# Patient Record
Sex: MALE | Race: WHITE | NOT HISPANIC OR LATINO | URBAN - METROPOLITAN AREA
[De-identification: names, ages, dates, MRNs, and addresses within clinical notes are randomized per-mention and may not be internally consistent; named-entity substitution may affect disease eponyms.]

---

## 2021-11-13 ENCOUNTER — EMERGENCY (EMERGENCY)
Facility: HOSPITAL | Age: 71
LOS: 1 days | Discharge: HOME | End: 2021-11-13
Attending: EMERGENCY MEDICINE
Payer: MEDICARE

## 2021-11-13 VITALS
DIASTOLIC BLOOD PRESSURE: 64 MMHG | TEMPERATURE: 98 F | HEIGHT: 65 IN | SYSTOLIC BLOOD PRESSURE: 142 MMHG | WEIGHT: 167.99 LBS | OXYGEN SATURATION: 98 % | HEART RATE: 82 BPM | RESPIRATION RATE: 16 BRPM

## 2021-11-13 VITALS
RESPIRATION RATE: 16 BRPM | HEART RATE: 78 BPM | DIASTOLIC BLOOD PRESSURE: 75 MMHG | OXYGEN SATURATION: 94 % | SYSTOLIC BLOOD PRESSURE: 154 MMHG | TEMPERATURE: 98 F

## 2021-11-13 PROCEDURE — 99284 EMERGENCY DEPT VISIT MOD MDM: CPT

## 2021-11-13 PROCEDURE — 99283 EMERGENCY DEPT VISIT LOW MDM: CPT

## 2021-11-13 RX ORDER — DIAZEPAM 5 MG
1 TABLET ORAL
Qty: 8 | Refills: 0
Start: 2021-11-13

## 2021-11-13 RX ORDER — ACETAMINOPHEN 500 MG
975 TABLET ORAL ONCE
Refills: 0 | Status: COMPLETED | OUTPATIENT
Start: 2021-11-13 | End: 2021-11-13

## 2021-11-13 RX ORDER — LIDOCAINE 4 G/100G
1 CREAM TOPICAL ONCE
Refills: 0 | Status: COMPLETED | OUTPATIENT
Start: 2021-11-13 | End: 2021-11-13

## 2021-11-13 RX ORDER — DIAZEPAM 5 MG
5 TABLET ORAL ONCE
Refills: 0 | Status: DISCONTINUED | OUTPATIENT
Start: 2021-11-13 | End: 2021-11-13

## 2021-11-13 RX ORDER — IBUPROFEN 200 MG
400 TABLET ORAL ONCE
Refills: 0 | Status: COMPLETED | OUTPATIENT
Start: 2021-11-13 | End: 2021-11-13

## 2021-11-13 RX ADMIN — Medication 975 MILLIGRAM(S): at 15:59

## 2021-11-13 RX ADMIN — LIDOCAINE 1 PATCH: 4 CREAM TOPICAL at 16:00

## 2021-11-13 RX ADMIN — Medication 400 MILLIGRAM(S): at 16:00

## 2021-11-13 RX ADMIN — Medication 5 MILLIGRAM(S): at 16:00

## 2021-11-13 NOTE — ED PROVIDER NOTE - PHYSICAL EXAMINATION
NAD, VSS, Afebrile, Lungs clear. ABD soft, non tender. No CVA tender. No spina midline tender. + b/l para lumbar tender with left sided muscle spasm. No sciatica tender. No peripheral edema. Neuro- intact.

## 2021-11-13 NOTE — ED PROVIDER NOTE - CLINICAL SUMMARY MEDICAL DECISION MAKING FREE TEXT BOX
LYNNETTE Lorenz MD: 72 y/o male with PMH HLD presents with back pain. LYNNETTE Lorenz MD: 72 y/o male with PMH HLD presents with acute on chronic back pain. Pt has a h/o LBP x months, followed with his PCP Dr. Guido, getting PT. Has had gradually worsening LBP x 2 weeks, non-radiating. Denies focal numbness/weakness, bowel/bladder incontinence, saddle anesthesia, pain radiating down legs, IVDA, f/c. No red flag signs/sx concerning for cauda equina or cord compression. Pt is ambulatory with no focal neurologic deficits on exam. No previous epidural injections. Plan: pain control, outpt f/u with Spine center with return precautions

## 2021-11-13 NOTE — ED ADULT NURSE NOTE - OBJECTIVE STATEMENT
72 y/o male coming in from home complaining of left flank pain. AOx4, ambulatory, PMH spinal surgery, HLD. Pt. reports multiple weeks of left lower back pain. No bruising, signs of trauma noted. Denies falls. Denies abdominal pain. Denies urinary symptoms. 72 y/o male coming in from home complaining of left flank pain. AOx4, ambulatory, PMH spinal surgery, HLD. Pt. reports multiple weeks of left lower back pain. Pain has been worsening and feels like a spasm. Pain is worse with ambulation. No bruising, signs of trauma noted. Denies falls. Denies abdominal pain. Denies urinary symptoms. Denies chest pain, SOB, fevers, chills, N/V/D. Pt. is well appearing. VSS. WIll continue to reassess.

## 2021-11-13 NOTE — ED PROVIDER NOTE - PATIENT PORTAL LINK FT
You can access the FollowMyHealth Patient Portal offered by [facility] by registering at the following website: [email or web address]. By joining [employer] [facility]’s FollowMyHealth portal, you will also be able to view your health information using other applications (apps) compatible with our system.

## 2021-11-13 NOTE — ED PROVIDER NOTE - OBJECTIVE STATEMENT
72yo male pt with PMHx of HLD presents to ED with worsening back spasm pain for 2 weeks. Pt stated he's had lower back pain for months and evaluated by PMD Dr. Bauman. He's on PT now but noticed gradual worsening back spasm pain with movement since 2 weeks ago. Denies recent injury. Denies radiating pain to extremities. Denies fever, chills, cough or congestion. Denies CP/SOB/ABD pain or N/V/D. Denies urinary or bowel problems. Denies sensory changes or weakness to extremities.

## 2021-11-13 NOTE — ED PROVIDER NOTE - NSFOLLOWUPINSTRUCTIONS_ED_ALL_ED_FT
No heavy lifting or strain your back. No heavy lifting or strain your back.    Salonpas with Lidocaine patch to pain area as package directed.    Take Advil 400mg and/or Tylenol 975mg every 8hours for pain with food.     Valium as prescribed for back spasm pain with a caution of drowsiness; NO DRIVE or DRINK while taking Valium.    Follow up with your primary [facility] ([phone]) for reevaluation, call Monday for appointment.    Return for any concerns or worsening symptoms. No heavy lifting or strain your back.    Salonpas with Lidocaine patch to pain area as package directed.    Take Advil 400mg and/or Tylenol 975mg every 8hours for pain with food.     Valium as prescribed for back spasm pain with a caution of drowsiness; NO DRIVE or DRINK while taking Valium.    Follow up with your primary [facility] ([phone]) for reevaluation, call Monday for appointment.    Return for any concerns or worsening symptoms.    Please see the information of back pain.                                                        Back Pain    WHAT YOU NEED TO KNOW:    What do I need to know about back pain? Back pain is common. You may have back pain and muscle spasms. You may feel sore or stiff on one or both sides of your back. The pain may spread to your lower body. Conditions that affect the spine, joints, or muscles can cause back pain. These may include arthritis, spinal stenosis (narrowing of the spinal column), muscle tension, or breakdown of the spinal discs.    What increases my risk for back pain?   •Repeated bending, lifting, or twisting, or lifting heavy items      •Injury from a fall or accident      •Lack of regular physical activity       •Obesity or pregnancy       •Smoking      •Aging      •Driving, sitting, or standing for long periods      •Bad posture while sitting or standing      How is back pain diagnosed? Your healthcare provider will ask if you have any medical conditions. He or she may ask if you have a history of back pain and how it started. He or she may watch you stand and walk, and check your range of motion. Show him or her where you feel pain and what makes it better or worse. Describe the pain, how bad it is, and how long it lasts. Tell your provider if your pain worsens at night or when you lie on your back.    How is back pain treated?   •Medicines: ?NSAIDs, such as ibuprofen, help decrease swelling, pain, and fever. This medicine is available with or without a doctor's order. NSAIDs may be given as a pill or as a cream that is applied to your back. NSAIDs can cause stomach bleeding or kidney problems in certain people. If you take blood thinner medicine, always ask your healthcare provider if NSAIDs are safe for you. Always read the medicine label and follow directions.      ?Acetaminophen decreases pain and fever. It is available without a doctor's order. Ask how much to take and how often to take it. Follow directions. Read the labels of all other medicines you are using to see if they also contain acetaminophen, or ask your doctor or pharmacist. Acetaminophen can cause liver damage if not taken correctly. Do not use more than 4 grams (4,000 milligrams) total of acetaminophen in one day.       ?Muscle relaxers help decrease muscle spasms and back pain.      •Acupressure may be recommended to decrease pain and improve movement. Acupressure is pressure or localized massage to the area of your back pain.       •A transcutaneous electrical nerve stimulation (TENS) unit is a portable, pocket-sized, battery-powered device that attaches to your skin. It is usually placed over the area of pain. It uses mild, safe electrical signals to help control pain.      How do I manage my back pain?   •Apply ice on your back for 15 to 20 minutes every hour or as directed. Use an ice pack, or put crushed ice in a plastic bag. Cover it with a towel before you apply it to your skin. Ice helps prevent tissue damage and decreases pain.      •Apply heat on your back for 20 to 30 minutes every 2 hours for as many days as directed. Heat helps decrease pain and muscle spasms.      •Stay active as much as you can without causing more pain. Bed rest could make your back pain worse. Avoid heavy lifting until your pain is gone.      •Go to physical therapy as directed. A physical therapist can teach you exercises to help improve movement and strength, and to decrease pain.      Call your local emergency number (911 in the [place]) if:   •You have severe back pain with chest pain.      •You cannot control your urine or bowel movements.      •Your pain becomes so severe that you cannot walk.      When should I seek immediate care?   •You have pain, numbness, or weakness in one or both legs.      •You have severe back pain, nausea, and vomiting.      •You have severe back pain that spreads to your side or genital area.      When should I call my doctor?   •You have back pain that does not get better with rest and pain medicine.      •You have a fever.      •You have pain that worsens when you are on your back or when you rest.      •You have pain that worsens when you cough or sneeze.      •You lose weight without trying.      •You have questions or concerns about your condition or care.      CARE AGREEMENT:    You have the right to help plan your care. Learn about your health condition and how it may be treated. Discuss treatment options with your healthcare providers to decide what care you want to receive. You always have the right to refuse treatment.

## 2021-11-13 NOTE — ED ADULT NURSE NOTE - NSIMPLEMENTINTERV_GEN_ALL_ED
Implemented All Fall Risk Interventions:  [place] to call system. Call bell, personal items and telephone within reach. Instruct patient to call for assistance. Room bathroom lighting operational. Non-slip footwear when patient is off stretcher. Physically safe environment: no spills, clutter or unnecessary equipment. Stretcher in lowest position, wheels locked, appropriate side rails in place. Provide visual cue, wrist band, yellow gown, etc. Monitor gait and stability. Monitor for mental status changes and reorient to person, place, and time. Review medications for side effects contributing to fall risk. Reinforce activity limits and safety measures with patient and family.

## 2022-05-17 PROBLEM — Z00.00 ENCOUNTER FOR PREVENTIVE HEALTH EXAMINATION: Status: ACTIVE | Noted: 2022-05-17

## 2022-05-31 ENCOUNTER — INPATIENT (INPATIENT)
Facility: HOSPITAL | Age: 72
LOS: 3 days | Discharge: HOME | DRG: 683 | End: 2022-06-04
Attending: INTERNAL MEDICINE | Admitting: INTERNAL MEDICINE
Payer: MEDICARE

## 2022-05-31 VITALS
SYSTOLIC BLOOD PRESSURE: 187 MMHG | DIASTOLIC BLOOD PRESSURE: 76 MMHG | HEIGHT: 65 IN | RESPIRATION RATE: 18 BRPM | WEIGHT: 160.06 LBS | HEART RATE: 97 BPM | TEMPERATURE: 98 F | OXYGEN SATURATION: 94 %

## 2022-05-31 DIAGNOSIS — N13.9 OBSTRUCTIVE AND REFLUX UROPATHY, UNSPECIFIED: ICD-10-CM

## 2022-05-31 DIAGNOSIS — N17.9 ACUTE KIDNEY FAILURE, UNSPECIFIED: ICD-10-CM

## 2022-05-31 DIAGNOSIS — E87.2 ACIDOSIS: ICD-10-CM

## 2022-05-31 DIAGNOSIS — E87.5 HYPERKALEMIA: ICD-10-CM

## 2022-05-31 LAB
ALBUMIN SERPL ELPH-MCNC: 4.3 G/DL — SIGNIFICANT CHANGE UP (ref 3.3–5)
ALP SERPL-CCNC: 54 U/L — SIGNIFICANT CHANGE UP (ref 40–120)
ALT FLD-CCNC: 8 U/L — LOW (ref 10–45)
ANION GAP SERPL CALC-SCNC: 21 MMOL/L — HIGH (ref 5–17)
ANION GAP SERPL CALC-SCNC: 21 MMOL/L — HIGH (ref 5–17)
ANISOCYTOSIS BLD QL: SLIGHT — SIGNIFICANT CHANGE UP
APPEARANCE UR: CLEAR — SIGNIFICANT CHANGE UP
AST SERPL-CCNC: 6 U/L — LOW (ref 10–40)
BACTERIA # UR AUTO: NEGATIVE — SIGNIFICANT CHANGE UP
BASE EXCESS BLDV CALC-SCNC: -10.3 MMOL/L — LOW (ref -2–2)
BASOPHILS # BLD AUTO: 0 K/UL — SIGNIFICANT CHANGE UP (ref 0–0.2)
BASOPHILS NFR BLD AUTO: 0 % — SIGNIFICANT CHANGE UP (ref 0–2)
BILIRUB SERPL-MCNC: 0.2 MG/DL — SIGNIFICANT CHANGE UP (ref 0.2–1.2)
BILIRUB UR-MCNC: NEGATIVE — SIGNIFICANT CHANGE UP
BUN SERPL-MCNC: 112 MG/DL — HIGH (ref 7–23)
BUN SERPL-MCNC: 118 MG/DL — HIGH (ref 7–23)
BURR CELLS BLD QL SMEAR: PRESENT — SIGNIFICANT CHANGE UP
CA-I SERPL-SCNC: 1.37 MMOL/L — HIGH (ref 1.15–1.33)
CALCIUM SERPL-MCNC: 10.7 MG/DL — HIGH (ref 8.4–10.5)
CALCIUM SERPL-MCNC: 9.5 MG/DL — SIGNIFICANT CHANGE UP (ref 8.4–10.5)
CHLORIDE BLDV-SCNC: 104 MMOL/L — SIGNIFICANT CHANGE UP (ref 96–108)
CHLORIDE SERPL-SCNC: 101 MMOL/L — SIGNIFICANT CHANGE UP (ref 96–108)
CHLORIDE SERPL-SCNC: 103 MMOL/L — SIGNIFICANT CHANGE UP (ref 96–108)
CO2 BLDV-SCNC: 18 MMOL/L — LOW (ref 22–26)
CO2 SERPL-SCNC: 14 MMOL/L — LOW (ref 22–31)
CO2 SERPL-SCNC: 15 MMOL/L — LOW (ref 22–31)
COLOR SPEC: COLORLESS — SIGNIFICANT CHANGE UP
CREAT SERPL-MCNC: 10.81 MG/DL — HIGH (ref 0.5–1.3)
CREAT SERPL-MCNC: 12.91 MG/DL — HIGH (ref 0.5–1.3)
DACRYOCYTES BLD QL SMEAR: SLIGHT — SIGNIFICANT CHANGE UP
DIFF PNL FLD: ABNORMAL
EGFR: 4 ML/MIN/1.73M2 — LOW
EGFR: 5 ML/MIN/1.73M2 — LOW
ELLIPTOCYTES BLD QL SMEAR: SLIGHT — SIGNIFICANT CHANGE UP
EOSINOPHIL # BLD AUTO: 0.37 K/UL — SIGNIFICANT CHANGE UP (ref 0–0.5)
EOSINOPHIL NFR BLD AUTO: 2.5 % — SIGNIFICANT CHANGE UP (ref 0–6)
EPI CELLS # UR: 0 /HPF — SIGNIFICANT CHANGE UP
FLUAV AG NPH QL: SIGNIFICANT CHANGE UP
FLUBV AG NPH QL: SIGNIFICANT CHANGE UP
GAS PNL BLDV: 134 MMOL/L — LOW (ref 136–145)
GAS PNL BLDV: SIGNIFICANT CHANGE UP
GLUCOSE BLDV-MCNC: 138 MG/DL — HIGH (ref 70–99)
GLUCOSE SERPL-MCNC: 85 MG/DL — SIGNIFICANT CHANGE UP (ref 70–99)
GLUCOSE SERPL-MCNC: 92 MG/DL — SIGNIFICANT CHANGE UP (ref 70–99)
GLUCOSE UR QL: NEGATIVE — SIGNIFICANT CHANGE UP
HCO3 BLDV-SCNC: 16 MMOL/L — LOW (ref 22–29)
HCT VFR BLD CALC: 26.2 % — LOW (ref 39–50)
HCT VFR BLDA CALC: 28 % — LOW (ref 39–51)
HGB BLD CALC-MCNC: 9.2 G/DL — LOW (ref 12.6–17.4)
HGB BLD-MCNC: 8.4 G/DL — LOW (ref 13–17)
KETONES UR-MCNC: NEGATIVE — SIGNIFICANT CHANGE UP
LACTATE BLDV-MCNC: 0.7 MMOL/L — SIGNIFICANT CHANGE UP (ref 0.7–2)
LEUKOCYTE ESTERASE UR-ACNC: NEGATIVE — SIGNIFICANT CHANGE UP
LYMPHOCYTES # BLD AUTO: 2.98 K/UL — SIGNIFICANT CHANGE UP (ref 1–3.3)
LYMPHOCYTES # BLD AUTO: 20.3 % — SIGNIFICANT CHANGE UP (ref 13–44)
MANUAL SMEAR VERIFICATION: SIGNIFICANT CHANGE UP
MCHC RBC-ENTMCNC: 29.6 PG — SIGNIFICANT CHANGE UP (ref 27–34)
MCHC RBC-ENTMCNC: 32.1 GM/DL — SIGNIFICANT CHANGE UP (ref 32–36)
MCV RBC AUTO: 92.3 FL — SIGNIFICANT CHANGE UP (ref 80–100)
METAMYELOCYTES # FLD: 1.7 % — HIGH (ref 0–0)
MICROCYTES BLD QL: SLIGHT — SIGNIFICANT CHANGE UP
MONOCYTES # BLD AUTO: 1 K/UL — HIGH (ref 0–0.9)
MONOCYTES NFR BLD AUTO: 6.8 % — SIGNIFICANT CHANGE UP (ref 2–14)
NEUTROPHILS # BLD AUTO: 10.08 K/UL — HIGH (ref 1.8–7.4)
NEUTROPHILS NFR BLD AUTO: 68.7 % — SIGNIFICANT CHANGE UP (ref 43–77)
NITRITE UR-MCNC: NEGATIVE — SIGNIFICANT CHANGE UP
PCO2 BLDV: 38 MMHG — LOW (ref 42–55)
PH BLDV: 7.24 — LOW (ref 7.32–7.43)
PH UR: 6 — SIGNIFICANT CHANGE UP (ref 5–8)
PLAT MORPH BLD: NORMAL — SIGNIFICANT CHANGE UP
PLATELET # BLD AUTO: 336 K/UL — SIGNIFICANT CHANGE UP (ref 150–400)
PO2 BLDV: 33 MMHG — SIGNIFICANT CHANGE UP (ref 25–45)
POTASSIUM BLDV-SCNC: 5.8 MMOL/L — HIGH (ref 3.5–5.1)
POTASSIUM SERPL-MCNC: 6 MMOL/L — HIGH (ref 3.5–5.3)
POTASSIUM SERPL-MCNC: 6.4 MMOL/L — CRITICAL HIGH (ref 3.5–5.3)
POTASSIUM SERPL-SCNC: 6 MMOL/L — HIGH (ref 3.5–5.3)
POTASSIUM SERPL-SCNC: 6.4 MMOL/L — CRITICAL HIGH (ref 3.5–5.3)
PROT SERPL-MCNC: 8.3 G/DL — SIGNIFICANT CHANGE UP (ref 6–8.3)
PROT UR-MCNC: NEGATIVE — SIGNIFICANT CHANGE UP
RBC # BLD: 2.84 M/UL — LOW (ref 4.2–5.8)
RBC # FLD: 15.2 % — HIGH (ref 10.3–14.5)
RBC BLD AUTO: ABNORMAL
RBC CASTS # UR COMP ASSIST: 61 /HPF — HIGH (ref 0–4)
RSV RNA NPH QL NAA+NON-PROBE: SIGNIFICANT CHANGE UP
SAO2 % BLDV: 39.7 % — LOW (ref 67–88)
SARS-COV-2 RNA SPEC QL NAA+PROBE: SIGNIFICANT CHANGE UP
SCHISTOCYTES BLD QL AUTO: SLIGHT — SIGNIFICANT CHANGE UP
SODIUM SERPL-SCNC: 136 MMOL/L — SIGNIFICANT CHANGE UP (ref 135–145)
SODIUM SERPL-SCNC: 139 MMOL/L — SIGNIFICANT CHANGE UP (ref 135–145)
SP GR SPEC: 1.01 — SIGNIFICANT CHANGE UP (ref 1.01–1.02)
UROBILINOGEN FLD QL: NEGATIVE — SIGNIFICANT CHANGE UP
WBC # BLD: 14.67 K/UL — HIGH (ref 3.8–10.5)
WBC # FLD AUTO: 14.67 K/UL — HIGH (ref 3.8–10.5)
WBC UR QL: 1 /HPF — SIGNIFICANT CHANGE UP (ref 0–5)

## 2022-05-31 PROCEDURE — 93010 ELECTROCARDIOGRAM REPORT: CPT

## 2022-05-31 PROCEDURE — 76770 US EXAM ABDO BACK WALL COMP: CPT | Mod: 26

## 2022-05-31 PROCEDURE — 99285 EMERGENCY DEPT VISIT HI MDM: CPT | Mod: 25

## 2022-05-31 RX ORDER — FUROSEMIDE 40 MG
40 TABLET ORAL ONCE
Refills: 0 | Status: COMPLETED | OUTPATIENT
Start: 2022-05-31 | End: 2022-05-31

## 2022-05-31 RX ORDER — CALCIUM GLUCONATE 100 MG/ML
2 VIAL (ML) INTRAVENOUS ONCE
Refills: 0 | Status: COMPLETED | OUTPATIENT
Start: 2022-05-31 | End: 2022-05-31

## 2022-05-31 RX ORDER — DEXTROSE 50 % IN WATER 50 %
50 SYRINGE (ML) INTRAVENOUS ONCE
Refills: 0 | Status: COMPLETED | OUTPATIENT
Start: 2022-05-31 | End: 2022-05-31

## 2022-05-31 RX ORDER — SODIUM ZIRCONIUM CYCLOSILICATE 10 G/10G
10 POWDER, FOR SUSPENSION ORAL ONCE
Refills: 0 | Status: COMPLETED | OUTPATIENT
Start: 2022-05-31 | End: 2022-05-31

## 2022-05-31 RX ORDER — INSULIN HUMAN 100 [IU]/ML
5 INJECTION, SOLUTION SUBCUTANEOUS ONCE
Refills: 0 | Status: COMPLETED | OUTPATIENT
Start: 2022-05-31 | End: 2022-05-31

## 2022-05-31 RX ORDER — MORPHINE SULFATE 50 MG/1
4 CAPSULE, EXTENDED RELEASE ORAL ONCE
Refills: 0 | Status: DISCONTINUED | OUTPATIENT
Start: 2022-05-31 | End: 2022-05-31

## 2022-05-31 RX ADMIN — MORPHINE SULFATE 4 MILLIGRAM(S): 50 CAPSULE, EXTENDED RELEASE ORAL at 19:40

## 2022-05-31 RX ADMIN — Medication 200 GRAM(S): at 19:58

## 2022-05-31 RX ADMIN — Medication 50 MILLILITER(S): at 19:57

## 2022-05-31 RX ADMIN — Medication 40 MILLIGRAM(S): at 19:57

## 2022-05-31 RX ADMIN — INSULIN HUMAN 5 UNIT(S): 100 INJECTION, SOLUTION SUBCUTANEOUS at 19:58

## 2022-05-31 NOTE — ED PROVIDER NOTE - OBJECTIVE STATEMENT
71y male pmh HTN, Low back pain, past year tx MRI last 12/2021. Now comes to ed referred  gastroenterologist today (scheduled for colposcopy today) for distended bladder. Patient intermittently takes flomax and notes urination but "doesn't feel completely empty". Denies fevers, chills, chest pain, SOB, or nausea, vomiting/diarrhea.

## 2022-05-31 NOTE — ED ADULT NURSE NOTE - NSIMPLEMENTINTERV_GEN_ALL_ED
Implemented All Fall Risk Interventions:  [place] to call system. Call bell, personal items and telephone within reach. Instruct patient to call for assistance. Room bathroom lighting operational. Non-slip footwear when patient is off stretcher. Physically safe environment: no spills, clutter or unnecessary equipment. Stretcher in lowest position, wheels locked, appropriate side rails in place. Provide visual cue, wrist band, yellow gown, etc. Monitor gait and stability. Monitor for mental status changes and reorient to person, place, and time. Review medications for side effects contributing to fall risk. Reinforce activity limits and safety measures with patient and family.

## 2022-05-31 NOTE — CONSULT NOTE ADULT - PROBLEM SELECTOR RECOMMENDATION 2
Pt. with  hyperkalemia in the setting fo AGUS. Serum potassium is elevated to 6.4(non hemolyzed). Received medical management.- insulin , D50, lokelma, calcium gluconate. Repeat BMP in 6 hrs. Monitor serum K.

## 2022-05-31 NOTE — CONSULT NOTE ADULT - PROBLEM SELECTOR RECOMMENDATION 3
In the setting of AGUS. Serum CO2 is low at 14. Check serum lactate levels. Monitor serum CO2 daily.     \  D/w attending on call.     If you have any questions, please feel free to contact me  Jay Jay Brush  Nephrology Fellow  [phone]/ [employer] Teams(Preferred)  (After 5pm or on weekends please page the on-call fellow).

## 2022-05-31 NOTE — ED ADULT NURSE NOTE - OBJECTIVE STATEMENT
71y old male PMH HTN, no other medical hx as per patient, walk in from triage c/o urinary retention. A&Ox3. Patient states he was getting screened for a colonoscopy for today when the doctor felt his bladder he sent him to the ER for Fraire insertion. PT admits 2wks ago he was started on Lasix for pedal edema, since has been peeing 2-3x an hour w/ little output, he also endorses he use to be on Flomax takes as needed. He denies chest pain, sob, ha, n/v/d, abdominal pain, f/c, urinary symptoms, hematuria. Patient appears uncomfortable, gross neuro intact, lungs cta bilaterally, no difficulty speaking in complete sentences,  abdomen round nontender, bladder distended, skin intact. IV inserted.   Rn performed bladder scanner as per MD request, scanner showed more than 700cc of fluid.   16F Fraire placed  using sterile technique. Second RN present to confirm sterility. Explained procedure as it was being done - Pt tolerated procedure well. Sterile specimens collected and sent to lab as ordered. drained aprox 1800ml of urine drained MD notified. Comfort and safety provided.

## 2022-05-31 NOTE — ED ADULT TRIAGE NOTE - CHIEF COMPLAINT QUOTE
urinary s/s- retention, urgency, diff urinating associated with bladder distention and generalized worsening back pain   sent in by dr evans for full work up

## 2022-05-31 NOTE — ED PROVIDER NOTE - CLINICAL SUMMARY MEDICAL DECISION MAKING FREE TEXT BOX
71y male pmh HTN, Low back pain, past year tx MRI last 12/2021. Now comes to ed referred  gastroenterologist today (scheduled for colposcopy today) for distended bladder; bladder scan w/ >700cc; will place chapman + assess labs + reassess 71y male pmh HTN, Low back pain, past year tx MRI last 12/2021. Now comes to ed referred  gastroenterologist today (scheduled for colposcopy today) for distended bladder; bladder scan w/ >700cc; will place chapman + assess labs + reassess. Low concern spinal cord compression at this time given intact strength, normal sensation and normal rectal tone

## 2022-05-31 NOTE — H&P ADULT - NSHPLABSRESULTS_GEN_ALL_CORE
9.8    21.26 )-----------( 456      ( 01 Jun 2022 06:06 )             29.4     06-01    136  |  101  |  104<H>  ----------------------------<  94  x    |  15<L>  |  8.84<H>    Ca    10.6<H>      01 Jun 2022 06:06    TPro  9.1<H>  /  Alb  4.3  /  TBili  0.4  /  DBili  x   /  AST  10  /  ALT  10  /  AlkPhos  61  06-01

## 2022-05-31 NOTE — CONSULT NOTE ADULT - SUBJECTIVE AND OBJECTIVE BOX
[facility] DIVISION OF KIDNEY DISEASES AND HYPERTENSION -- [phone]  -- INITIAL CONSULT NOTE  --------------------------------------------------------------------------------  HPI: Patient is a 71 year old Male with significant PMH of HTN, chronic low back pain who came to the [facility] on 5/31 with c/o abdominal distension , LE swelling and decreased urination 2-3 days. Pt. c/o increased frequency of urination initially but for the last 2-3 days he noticed decreased urination. Also noticed abdominal distension. Of note, he was started on aldactone 25 mg PO OD and bumex 1 mg PO OD by his PCP 2-3 months ago for LE swelling. Initial labs in ER, showed elevated Scr of 12.9 with hyperkalemia. Nephrology consulted for AGUS and hyper kalemia.     On review of [facility], it was noted that Scr was 1.29 on 5/4/22. Scr was elevated to 2 on 5/18. Scr was significantly elevated to 12.9 today. Also noted serum PSA was elevated to 7.6 in 2020. Chapman cath was placed and 2L of urine was drained. Pt. denies Hx of kidney disease or kidney stones in the past. Denies use of NSAIDs/ motrin or OTC meds recently. Pt. denies fever, chills, SOB, CP, Ha, N/V, diarrhea or constipation or dizziness.     PAST HISTORY  --------------------------------------------------------------------------------  PAST MEDICAL & SURGICAL HISTORY:  Hypertension    FAMILY HISTORY:    PAST SOCIAL HISTORY:    ALLERGIES & MEDICATIONS  --------------------------------------------------------------------------------  Allergies    No Known Allergies    Intolerances    Standing Inpatient Medications  calcium gluconate IVPB 2 Gram(s) IV Intermittent Once  dextrose 50% Injectable 50 milliLiter(s) IV Push once  furosemide   Injectable 40 milliGRAM(s) IV Push Once  insulin regular  human recombinant 5 Unit(s) IV Push once  sodium zirconium cyclosilicate 10 Gram(s) Oral Once    PRN Inpatient Medications    REVIEW OF SYSTEMS  --------------------------------------------------------------------------------  Gen: No fevers/chills  Skin: No rashes  Head/Eyes/Ears: Normal hearing,   Respiratory: No dyspnea, cough  CV: No chest pain  GI: No abdominal pain, diarrhea, see HPI  : No dysuria, hematuria, see HPI  MSK: No  edema  Heme: No easy bruising or bleeding  Psych: No significant depression    All other systems were reviewed and are negative, except as noted.    VITALS/PHYSICAL EXAM  --------------------------------------------------------------------------------  T(C): 36.3 (05-31-22 @ 19:22), Max: 36.7 (05-31-22 @ 16:44)  HR: 90 (05-31-22 @ 19:22) (88 - 97)  BP: 183/74 (05-31-22 @ 19:22) (178/82 - 187/76)  RR: 18 (05-31-22 @ 19:22) (16 - 18)  SpO2: 97% (05-31-22 @ 19:22) (94% - 97%)  Wt(kg): --  Height (cm): 165.1 (05-31-22 @ 16:44)  Weight (kg): 72.6 (05-31-22 @ 16:44)  BMI (kg/m2): 26.6 (05-31-22 @ 16:44)  BSA (m2): 1.8 (05-31-22 @ 16:44)    Physical Exam:  	Gen: NAD  	HEENT: MMM  	Pulm: CTA B/L  	CV: S1S2  	Abd: Soft, +BS               : no suprapubic tenderness, chapman cath in place  	Ext: B/L LE edema +  	Neuro: Awake  	Skin: Warm and dry  	Vascular access:    LABS/STUDIES  --------------------------------------------------------------------------------              8.4    14.67 >-----------<  336      [05-31-22 @ 18:15]              26.2     136  |  101  |  118  ----------------------------<  92      [05-31-22 @ 18:15]  6.4   |  14  |  12.91        Ca     9.5     [05-31-22 @ 18:15]    TPro  8.3  /  Alb  4.3  /  TBili  0.2  /  DBili  x   /  AST  6   /  ALT  8   /  AlkPhos  54  [05-31-22 @ 18:15]    Creatinine Trend:  SCr 12.91 [05-31 @ 18:15]    Urinalysis - [05-31-22 @ 18:15]      Color Colorless / Appearance Clear / SG 1.010 / pH 6.0      Gluc Negative / Ketone Negative  / Bili Negative / Urobili Negative       Blood Moderate / Protein Negative / Leuk Est Negative / Nitrite Negative      RBC 61 / WBC 1 / Hyaline  / Gran  / Sq Epi  / Non Sq Epi 0 / Bacteria Negative

## 2022-05-31 NOTE — CONSULT NOTE ADULT - ATTENDING COMMENTS
Gross hematuria.  Wife at bedside in ED.  Acknowledges urinary retention with abdominal mass.  Feeling weak,+ abdominal pain  1.  ARF--borderline HD indications initially but robust response to urinary system decompression suggest should not have dialysis requirement at this time.    2.  Hyperkalemia--associated with 3.  NOTE high Ca protective against electrophysiologic effects.  Alkaline volume expansion improve intracellular shift + distal tubule elimination.    3.  Obstructive uropathy--given bladder volume, long standing.  Urology input  4  Hematuria--trend UO.  If clots may require CBI    discussed with med team

## 2022-05-31 NOTE — CONSULT NOTE ADULT - PROBLEM SELECTOR RECOMMENDATION 9
Pt. with AGUS in the setting of urinary retention. On review of [facility], it was noted that Scr was 1.29 on 5/4/22. Scr was elevated to 2 on 5/18. Scr was significantly elevated to 12.9 today. Also noted serum PSA was elevated to 7.6 in 2020. Chapman cath was placed and 2L of urine was drained. UA showed 61 RBCs (likely traumatic chapman cath insertion). Check kidney and bladder sonogram. If Prostrate is enlarged , consider starting tamsulosin. Keep chapman in place.     Pt. is non uremic. Repeat BMP in 6 hrs. no urgent indication for HD. Monitor labs and urine output. Avoid any potential nephrotoxins. Dose medications as per eGFR.

## 2022-05-31 NOTE — H&P ADULT - ASSESSMENT
A/P     # Obstructive uropathy / Ac renal failure :  -in ED chapman placed : over 2 ltr urine drain   check UA/ urine c/s   seen by nephrology     # Metabolic acidosis   -started on IV fluids   started on sod. bicarb PO     Anemia :   -check anemia panel     BPH / urinary out let obstruction   s/p chapman placed   start flomax q HS   -will eventually need urology consult     advance care planning : d/w patient regarding advance directive. d/w him regarding CPR/ intubation / HD if the need arise. He agrees for everything for now. Remain full code. time spend 15 min       # Leukocytosis :  -likely reactive   UA is clean , CXR : no infiltrate   will monitor off abx

## 2022-05-31 NOTE — H&P ADULT - HISTORY OF PRESENT ILLNESS
HPI: Patient is a 71 year old Male with significant PMH of HTN, chronic low back pain who came to the [facility] on 5/31 with c/o abdominal distension , LE swelling and decreased urination 2-3 days. Pt. c/o increased frequency of urination initially but for the last 2-3 days he noticed decreased urination. Also noticed abdominal distension. Of note, he was started on aldactone 25 mg PO OD and bumex 1 mg PO OD by his PCP 2-3 months ago for LE swelling. Initial labs in ER, showed elevated Scr of 12.9 with hyperkalemia. Nephrology consulted for AGUS and hyper kalemia.     On review of [facility], it was noted that Scr was 1.29 on 5/4/22. Scr was elevated to 2 on 5/18. Scr was significantly elevated to 12.9 today. Also noted serum PSA was elevated to 7.6 in 2020. Fraire cath was placed and 2L of urine was drained. Pt. denies Hx of kidney disease or kidney stones in the past. Denies use of NSAIDs/ motrin or OTC meds recently. Pt. denies fever, chills, SOB, CP, Ha, N/V, diarrhea or constipation or dizziness.

## 2022-05-31 NOTE — ED PROVIDER NOTE - PHYSICAL EXAMINATION
GENERAL: well appearing in no acute distress, non-toxic appearing  HEAD: normocephalic, atraumatic  HENT: airway intact, neck supple  EYES: normal conjunctiva  CARDIAC: regular rate and rhythm, normal S1S2, no appreciable murmurs, 2+ pulses in UE/LE b/l  PULM: normal breath sounds, clear to ascultation bilaterally, no rales, rhonchi, wheezing  GI: abdomen nondistended, soft, nontender, no guarding, rebound tenderness; rectal tone intact  : rigid bladder  NEURO: no focal motor or sensory deficits  MSK: no peripheral edema, no calf tenderness b/l  SKIN: well-perfused, extremities warm, no visible rashes  PSYCH: appropriate mood and affect

## 2022-05-31 NOTE — ED PROVIDER NOTE - NS ED ROS FT
General: denies fever, chills  HENT: denies nasal congestion, rhinorrhea  Eyes: denies visual changes, blurred vision  CV: denies chest pain, palpitations  Resp: denies difficulty breathing, cough  Abdominal: denies nausea, vomiting, diarrhea, abdominal pain  : urinary retention  MSK: denies muscle aches, leg swelling  Neuro: denies headaches, numbness, tingling  Skin: denies rashes, bruises

## 2022-05-31 NOTE — H&P ADULT - NSHPPHYSICALEXAM_GEN_ALL_CORE
pt. seen and examined     Vital Signs Last 24 Hrs  T(C): 36.8 (01 Jun 2022 04:46), Max: 36.8 (01 Jun 2022 04:46)  T(F): 98.2 (01 Jun 2022 04:46), Max: 98.2 (01 Jun 2022 04:46)  HR: 88 (01 Jun 2022 04:46) (88 - 97)  BP: 121/69 (01 Jun 2022 04:46) (121/69 - 187/76)  BP(mean): --  RR: 20 (01 Jun 2022 04:46) (16 - 20)  SpO2: 93% (01 Jun 2022 04:46) (93% - 97%)    heent : nc/at , no pallor   neck : supple, no JVD  Lungs : B/L clear , no w/r/r  heart: s1s2 nml  abd : soft , now tenderness subsided , no distension   ext : no e/c/c, pulses 2 +  neuro: aaox3 , no focal deficit

## 2022-05-31 NOTE — ED PROVIDER NOTE - PROGRESS NOTE DETAILS
Deshawn, PGY2: Discussed w/ nephrology given potassium of 6.4 and Cr 12; likely in the setting of obstructive uropathy; assessing EKG now. Will repeat and admit Kiki Osborne, DO PGY-1  Potassium correcting s/p cahpman, 5.8 now from 6.4.   Spoke with Dr. Jones who accepted the patient.

## 2022-05-31 NOTE — ED PROVIDER NOTE - ATTENDING CONTRIBUTION TO CARE
Private Physician Maciel Guido PCP,  71y male pmh HTN, Low back pain, past year tx MRI last 12/2021. Now comes to ed referred  by md for c/o distended bladder. Pt was at gi office today for consultation for future colonscopy before. Pt reports not able to fully empty bladder past two weeks after starting diuretics. Symptoms worsened. Pt was previously tx w flomax for several years takes very infrequently. PE WDWN male awake alert normocephalic atraumatic neck supple chest clear anterior & posterior cv no rubs, gallops or murmurs abd soft +bs Suprapubic mass c/w distended bladder. Neuro gcs 15 speech fluent power 5/5 all extr sensation intact  Juventino Rodríguez MD, Facep

## 2022-05-31 NOTE — ED ADULT NURSE REASSESSMENT NOTE - NS ED NURSE REASSESS COMMENT FT1
Received patient from RN, patient at baseline mental status, able to make needs known, NAD, VSS, patient agreeable to plan of care, pending admission, comfort and safety provided.

## 2022-06-01 ENCOUNTER — APPOINTMENT (OUTPATIENT)
Dept: ORTHOPEDIC SURGERY | Facility: CLINIC | Age: 72
End: 2022-06-01

## 2022-06-01 LAB
ALBUMIN SERPL ELPH-MCNC: 4.3 G/DL — SIGNIFICANT CHANGE UP (ref 3.3–5)
ALP SERPL-CCNC: 61 U/L — SIGNIFICANT CHANGE UP (ref 40–120)
ALT FLD-CCNC: 10 U/L — SIGNIFICANT CHANGE UP (ref 10–45)
ANION GAP SERPL CALC-SCNC: 14 MMOL/L — SIGNIFICANT CHANGE UP (ref 5–17)
ANION GAP SERPL CALC-SCNC: 15 MMOL/L — SIGNIFICANT CHANGE UP (ref 5–17)
ANION GAP SERPL CALC-SCNC: 17 MMOL/L — SIGNIFICANT CHANGE UP (ref 5–17)
ANION GAP SERPL CALC-SCNC: 20 MMOL/L — HIGH (ref 5–17)
AST SERPL-CCNC: 10 U/L — SIGNIFICANT CHANGE UP (ref 10–40)
BILIRUB SERPL-MCNC: 0.4 MG/DL — SIGNIFICANT CHANGE UP (ref 0.2–1.2)
BUN SERPL-MCNC: 104 MG/DL — HIGH (ref 7–23)
BUN SERPL-MCNC: 85 MG/DL — HIGH (ref 7–23)
BUN SERPL-MCNC: 98 MG/DL — HIGH (ref 7–23)
BUN SERPL-MCNC: 99 MG/DL — HIGH (ref 7–23)
CALCIUM SERPL-MCNC: 10.4 MG/DL — SIGNIFICANT CHANGE UP (ref 8.4–10.5)
CALCIUM SERPL-MCNC: 10.6 MG/DL — HIGH (ref 8.4–10.5)
CHLORIDE SERPL-SCNC: 101 MMOL/L — SIGNIFICANT CHANGE UP (ref 96–108)
CHLORIDE SERPL-SCNC: 101 MMOL/L — SIGNIFICANT CHANGE UP (ref 96–108)
CHLORIDE SERPL-SCNC: 102 MMOL/L — SIGNIFICANT CHANGE UP (ref 96–108)
CHLORIDE SERPL-SCNC: 102 MMOL/L — SIGNIFICANT CHANGE UP (ref 96–108)
CO2 SERPL-SCNC: 15 MMOL/L — LOW (ref 22–31)
CO2 SERPL-SCNC: 18 MMOL/L — LOW (ref 22–31)
CO2 SERPL-SCNC: 21 MMOL/L — LOW (ref 22–31)
CO2 SERPL-SCNC: 22 MMOL/L — SIGNIFICANT CHANGE UP (ref 22–31)
CREAT SERPL-MCNC: 4.84 MG/DL — HIGH (ref 0.5–1.3)
CREAT SERPL-MCNC: 5.91 MG/DL — HIGH (ref 0.5–1.3)
CREAT SERPL-MCNC: 6.87 MG/DL — HIGH (ref 0.5–1.3)
CREAT SERPL-MCNC: 8.84 MG/DL — HIGH (ref 0.5–1.3)
CULTURE RESULTS: NO GROWTH — SIGNIFICANT CHANGE UP
EGFR: 12 ML/MIN/1.73M2 — LOW
EGFR: 6 ML/MIN/1.73M2 — LOW
EGFR: 8 ML/MIN/1.73M2 — LOW
EGFR: 9 ML/MIN/1.73M2 — LOW
GLUCOSE BLDC GLUCOMTR-MCNC: 105 MG/DL — HIGH (ref 70–99)
GLUCOSE BLDC GLUCOMTR-MCNC: 209 MG/DL — HIGH (ref 70–99)
GLUCOSE BLDC GLUCOMTR-MCNC: 223 MG/DL — HIGH (ref 70–99)
GLUCOSE SERPL-MCNC: 112 MG/DL — HIGH (ref 70–99)
GLUCOSE SERPL-MCNC: 137 MG/DL — HIGH (ref 70–99)
GLUCOSE SERPL-MCNC: 150 MG/DL — HIGH (ref 70–99)
GLUCOSE SERPL-MCNC: 94 MG/DL — SIGNIFICANT CHANGE UP (ref 70–99)
HCT VFR BLD CALC: 27.3 % — LOW (ref 39–50)
HCT VFR BLD CALC: 29.4 % — LOW (ref 39–50)
HCV AB S/CO SERPL IA: 0.09 S/CO — SIGNIFICANT CHANGE UP (ref 0–0.99)
HCV AB SERPL-IMP: SIGNIFICANT CHANGE UP
HGB BLD-MCNC: 9.1 G/DL — LOW (ref 13–17)
HGB BLD-MCNC: 9.8 G/DL — LOW (ref 13–17)
MCHC RBC-ENTMCNC: 29.4 PG — SIGNIFICANT CHANGE UP (ref 27–34)
MCHC RBC-ENTMCNC: 29.8 PG — SIGNIFICANT CHANGE UP (ref 27–34)
MCHC RBC-ENTMCNC: 33.3 GM/DL — SIGNIFICANT CHANGE UP (ref 32–36)
MCHC RBC-ENTMCNC: 33.3 GM/DL — SIGNIFICANT CHANGE UP (ref 32–36)
MCV RBC AUTO: 88.3 FL — SIGNIFICANT CHANGE UP (ref 80–100)
MCV RBC AUTO: 89.4 FL — SIGNIFICANT CHANGE UP (ref 80–100)
NRBC # BLD: 0 /100 WBCS — SIGNIFICANT CHANGE UP (ref 0–0)
NRBC # BLD: 0 /100 WBCS — SIGNIFICANT CHANGE UP (ref 0–0)
PLATELET # BLD AUTO: 440 K/UL — HIGH (ref 150–400)
PLATELET # BLD AUTO: 456 K/UL — HIGH (ref 150–400)
POTASSIUM SERPL-MCNC: 4.7 MMOL/L — SIGNIFICANT CHANGE UP (ref 3.5–5.3)
POTASSIUM SERPL-MCNC: 4.8 MMOL/L — SIGNIFICANT CHANGE UP (ref 3.5–5.3)
POTASSIUM SERPL-MCNC: 5 MMOL/L — SIGNIFICANT CHANGE UP (ref 3.5–5.3)
POTASSIUM SERPL-MCNC: 6.1 MMOL/L — HIGH (ref 3.5–5.3)
POTASSIUM SERPL-MCNC: 6.3 MMOL/L — CRITICAL HIGH (ref 3.5–5.3)
POTASSIUM SERPL-SCNC: 4.7 MMOL/L — SIGNIFICANT CHANGE UP (ref 3.5–5.3)
POTASSIUM SERPL-SCNC: 4.8 MMOL/L — SIGNIFICANT CHANGE UP (ref 3.5–5.3)
POTASSIUM SERPL-SCNC: 5 MMOL/L — SIGNIFICANT CHANGE UP (ref 3.5–5.3)
POTASSIUM SERPL-SCNC: 6.1 MMOL/L — HIGH (ref 3.5–5.3)
POTASSIUM SERPL-SCNC: 6.3 MMOL/L — CRITICAL HIGH (ref 3.5–5.3)
PROT SERPL-MCNC: 9.1 G/DL — HIGH (ref 6–8.3)
RBC # BLD: 3.09 M/UL — LOW (ref 4.2–5.8)
RBC # BLD: 3.29 M/UL — LOW (ref 4.2–5.8)
RBC # FLD: 15 % — HIGH (ref 10.3–14.5)
RBC # FLD: 15.1 % — HIGH (ref 10.3–14.5)
SODIUM SERPL-SCNC: 136 MMOL/L — SIGNIFICANT CHANGE UP (ref 135–145)
SODIUM SERPL-SCNC: 136 MMOL/L — SIGNIFICANT CHANGE UP (ref 135–145)
SODIUM SERPL-SCNC: 138 MMOL/L — SIGNIFICANT CHANGE UP (ref 135–145)
SODIUM SERPL-SCNC: 138 MMOL/L — SIGNIFICANT CHANGE UP (ref 135–145)
SPECIMEN SOURCE: SIGNIFICANT CHANGE UP
WBC # BLD: 20.34 K/UL — HIGH (ref 3.8–10.5)
WBC # BLD: 21.26 K/UL — HIGH (ref 3.8–10.5)
WBC # FLD AUTO: 20.34 K/UL — HIGH (ref 3.8–10.5)
WBC # FLD AUTO: 21.26 K/UL — HIGH (ref 3.8–10.5)

## 2022-06-01 PROCEDURE — 72141 MRI NECK SPINE W/O DYE: CPT | Mod: 26

## 2022-06-01 PROCEDURE — 99223 1ST HOSP IP/OBS HIGH 75: CPT | Mod: GC

## 2022-06-01 PROCEDURE — 72146 MRI CHEST SPINE W/O DYE: CPT | Mod: 26

## 2022-06-01 PROCEDURE — 72148 MRI LUMBAR SPINE W/O DYE: CPT | Mod: 26

## 2022-06-01 RX ORDER — HEPARIN SODIUM 5000 [USP'U]/ML
5000 INJECTION INTRAVENOUS; SUBCUTANEOUS EVERY 12 HOURS
Refills: 0 | Status: DISCONTINUED | OUTPATIENT
Start: 2022-06-01 | End: 2022-06-04

## 2022-06-01 RX ORDER — SODIUM CHLORIDE 9 MG/ML
1000 INJECTION INTRAMUSCULAR; INTRAVENOUS; SUBCUTANEOUS
Refills: 0 | Status: DISCONTINUED | OUTPATIENT
Start: 2022-06-01 | End: 2022-06-01

## 2022-06-01 RX ORDER — CALCIUM GLUCONATE 100 MG/ML
1 VIAL (ML) INTRAVENOUS ONCE
Refills: 0 | Status: COMPLETED | OUTPATIENT
Start: 2022-06-01 | End: 2022-06-01

## 2022-06-01 RX ORDER — LABETALOL HCL 100 MG
100 TABLET ORAL
Refills: 0 | Status: DISCONTINUED | OUTPATIENT
Start: 2022-06-01 | End: 2022-06-04

## 2022-06-01 RX ORDER — SODIUM BICARBONATE 1 MEQ/ML
650 SYRINGE (ML) INTRAVENOUS
Refills: 0 | Status: DISCONTINUED | OUTPATIENT
Start: 2022-06-01 | End: 2022-06-02

## 2022-06-01 RX ORDER — DEXTROSE 50 % IN WATER 50 %
25 SYRINGE (ML) INTRAVENOUS ONCE
Refills: 0 | Status: COMPLETED | OUTPATIENT
Start: 2022-06-01 | End: 2022-06-01

## 2022-06-01 RX ORDER — PANTOPRAZOLE SODIUM 20 MG/1
40 TABLET, DELAYED RELEASE ORAL
Refills: 0 | Status: DISCONTINUED | OUTPATIENT
Start: 2022-06-01 | End: 2022-06-04

## 2022-06-01 RX ORDER — ACETAMINOPHEN 500 MG
975 TABLET ORAL ONCE
Refills: 0 | Status: DISCONTINUED | OUTPATIENT
Start: 2022-06-01 | End: 2022-06-01

## 2022-06-01 RX ORDER — INSULIN HUMAN 100 [IU]/ML
5 INJECTION, SOLUTION SUBCUTANEOUS ONCE
Refills: 0 | Status: COMPLETED | OUTPATIENT
Start: 2022-06-01 | End: 2022-06-01

## 2022-06-01 RX ORDER — SODIUM CHLORIDE 9 MG/ML
1000 INJECTION, SOLUTION INTRAVENOUS
Refills: 0 | Status: DISCONTINUED | OUTPATIENT
Start: 2022-06-01 | End: 2022-06-04

## 2022-06-01 RX ORDER — SODIUM ZIRCONIUM CYCLOSILICATE 10 G/10G
10 POWDER, FOR SUSPENSION ORAL ONCE
Refills: 0 | Status: COMPLETED | OUTPATIENT
Start: 2022-06-01 | End: 2022-06-01

## 2022-06-01 RX ORDER — ACETAMINOPHEN 500 MG
1000 TABLET ORAL ONCE
Refills: 0 | Status: COMPLETED | OUTPATIENT
Start: 2022-06-01 | End: 2022-06-01

## 2022-06-01 RX ORDER — HYDROMORPHONE HYDROCHLORIDE 2 MG/ML
0.5 INJECTION INTRAMUSCULAR; INTRAVENOUS; SUBCUTANEOUS ONCE
Refills: 0 | Status: DISCONTINUED | OUTPATIENT
Start: 2022-06-01 | End: 2022-06-01

## 2022-06-01 RX ORDER — SODIUM BICARBONATE 1 MEQ/ML
50 SYRINGE (ML) INTRAVENOUS ONCE
Refills: 0 | Status: DISCONTINUED | OUTPATIENT
Start: 2022-06-01 | End: 2022-06-01

## 2022-06-01 RX ADMIN — HEPARIN SODIUM 5000 UNIT(S): 5000 INJECTION INTRAVENOUS; SUBCUTANEOUS at 17:40

## 2022-06-01 RX ADMIN — Medication 650 MILLIGRAM(S): at 06:05

## 2022-06-01 RX ADMIN — SODIUM ZIRCONIUM CYCLOSILICATE 10 GRAM(S): 10 POWDER, FOR SUSPENSION ORAL at 09:14

## 2022-06-01 RX ADMIN — INSULIN HUMAN 5 UNIT(S): 100 INJECTION, SOLUTION SUBCUTANEOUS at 09:15

## 2022-06-01 RX ADMIN — Medication 100 GRAM(S): at 09:15

## 2022-06-01 RX ADMIN — SODIUM CHLORIDE 100 MILLILITER(S): 9 INJECTION, SOLUTION INTRAVENOUS at 12:56

## 2022-06-01 RX ADMIN — Medication 25 GRAM(S): at 09:14

## 2022-06-01 RX ADMIN — Medication 650 MILLIGRAM(S): at 17:39

## 2022-06-01 RX ADMIN — SODIUM CHLORIDE 100 MILLILITER(S): 9 INJECTION INTRAMUSCULAR; INTRAVENOUS; SUBCUTANEOUS at 05:35

## 2022-06-01 RX ADMIN — PANTOPRAZOLE SODIUM 40 MILLIGRAM(S): 20 TABLET, DELAYED RELEASE ORAL at 12:56

## 2022-06-01 RX ADMIN — SODIUM ZIRCONIUM CYCLOSILICATE 10 GRAM(S): 10 POWDER, FOR SUSPENSION ORAL at 01:46

## 2022-06-01 RX ADMIN — Medication 100 MILLIGRAM(S): at 05:34

## 2022-06-01 RX ADMIN — HYDROMORPHONE HYDROCHLORIDE 0.5 MILLIGRAM(S): 2 INJECTION INTRAMUSCULAR; INTRAVENOUS; SUBCUTANEOUS at 12:03

## 2022-06-01 RX ADMIN — Medication 400 MILLIGRAM(S): at 01:32

## 2022-06-01 RX ADMIN — HEPARIN SODIUM 5000 UNIT(S): 5000 INJECTION INTRAVENOUS; SUBCUTANEOUS at 05:34

## 2022-06-01 RX ADMIN — HYDROMORPHONE HYDROCHLORIDE 0.5 MILLIGRAM(S): 2 INJECTION INTRAMUSCULAR; INTRAVENOUS; SUBCUTANEOUS at 12:20

## 2022-06-01 RX ADMIN — Medication 100 MILLIGRAM(S): at 17:39

## 2022-06-01 NOTE — CONSULT NOTE ADULT - SUBJECTIVE AND OBJECTIVE BOX
CARDIOLOGY CONSULT - Dr. Hernandez         HPI:  Patient is a 71 year old Male with significant PMH of HTN, chronic low back pain who came to the [facility] on 5/31 with c/o abdominal distension , LE swelling and decreased urination 2-3 days. Pt. c/o increased frequency of urination initially but for the last 2-3 days he noticed decreased urination. Also noticed abdominal distension. Of note, he was started on aldactone 25 mg PO OD and bumex 1 mg PO OD by his PCP 2-3 months ago for LE swelling. Initial labs in ER, showed elevated Scr of 12.9 with hyperkalemia. Nephrology consulted for AGUS and hyper kalemia.     On review of [facility], it was noted that Scr was 1.29 on 5/4/22. Scr was elevated to 2 on 5/18. Scr was significantly elevated to 12.9 today. Also noted serum PSA was elevated to 7.6 in 2020. Fraire cath was placed and 2L of urine was drained. Pt. denies Hx of kidney disease or kidney stones in the past. Denies use of NSAIDs/ motrin or OTC meds recently. Pt. denies fever, chills, SOB, CP, Ha, N/V, diarrhea or constipation or dizziness.  (31 May 2022 18:46)      PAST MEDICAL & SURGICAL HISTORY:  Hypertension              PREVIOUS DIAGNOSTIC TESTING:    [ ] Echocardiogram:  [ ]  Catheterization:  [ ] Stress Test:  	    MEDICATIONS:  Home Medications:  bumetanide 1 mg oral tablet: 1 tab(s) orally once a day (01 Jun 2022 09:00)  spironolactone 25 mg oral tablet: 1 tab(s) orally once a day (01 Jun 2022 09:00)      MEDICATIONS  (STANDING):  calcium gluconate IVPB 1 Gram(s) IV Intermittent once  dextrose 50% Injectable 25 Gram(s) IV Push once  heparin   Injectable 5000 Unit(s) SubCutaneous every 12 hours  insulin regular  human recombinant 5 Unit(s) IV Push once  labetalol 100 milliGRAM(s) Oral two times a day  pantoprazole    Tablet 40 milliGRAM(s) Oral before breakfast  sodium bicarbonate 650 milliGRAM(s) Oral two times a day  sodium chloride 0.9%. 1000 milliLiter(s) (100 mL/Hr) IV Continuous <Continuous>  sodium zirconium cyclosilicate 10 Gram(s) Oral once      FAMILY HISTORY:      SOCIAL HISTORY:    [ ] Non-smoker  [ ] Smoker  [ ] Alcohol    Allergies    No Known Allergies    Intolerances    	    REVIEW OF SYSTEMS:  CONSTITUTIONAL: No fever, weight loss, or fatigue  EYES: No eye pain, visual disturbances, or discharge  ENMT:  No difficulty hearing, tinnitus, vertigo; No sinus or throat pain  NECK: No pain or stiffness  RESPIRATORY: No cough, wheezing, chills or hemoptysis; No Shortness of Breath  CARDIOVASCULAR: No chest pain, palpitations, passing out, dizziness, or leg swelling  GASTROINTESTINAL: No abdominal or epigastric pain. No nausea, vomiting, or hematemesis; No diarrhea or constipation. No melena or hematochezia.  GENITOURINARY: No dysuria, frequency, hematuria, or incontinence  NEUROLOGICAL: No headaches, memory loss, loss of strength, numbness, or tremors  SKIN: No itching, burning, rashes, or lesions   	    [ ] All others negative	  [ ] Unable to obtain    PHYSICAL EXAM:  T(C): 36.4 (06-01-22 @ 07:56), Max: 36.8 (06-01-22 @ 04:46)  HR: 79 (06-01-22 @ 07:56) (79 - 97)  BP: 128/66 (06-01-22 @ 07:56) (118/66 - 187/76)  RR: 18 (06-01-22 @ 07:56) (16 - 20)  SpO2: 92% (06-01-22 @ 07:56) (92% - 97%)  Wt(kg): --  I&O's Summary      Appearance: Normal	  Psychiatry: A & O x 3, Mood & affect appropriate  HEENT:   Normal oral mucosa, PERRL, EOMI	  Lymphatic: No lymphadenopathy  Cardiovascular: Normal S1 S2,RRR, No JVD, No murmurs  Respiratory: Lungs clear to auscultation	  Gastrointestinal:  Soft, Non-tender, + BS	  Skin: No rashes, No ecchymoses, No cyanosis	  Neurologic: Non-focal  Extremities: Normal range of motion, No clubbing, cyanosis or edema  Vascular: Peripheral pulses palpable 2+ bilaterally    TELEMETRY: 	    ECG:  	  RADIOLOGY:  OTHER: 	  	  LABS:	 	    CARDIAC MARKERS:                                  9.8    21.26 )-----------( 456      ( 01 Jun 2022 06:06 )             29.4     06-01    136  |  101  |  104<H>  ----------------------------<  94  6.3<HH>   |  15<L>  |  8.84<H>    Ca    10.6<H>      01 Jun 2022 06:06    TPro  9.1<H>  /  Alb  4.3  /  TBili  0.4  /  DBili  x   /  AST  10  /  ALT  10  /  AlkPhos  61  06-01      proBNP:   Lipid Profile:   HgA1c:   TSH:        CARDIOLOGY CONSULT - Dr. Hernandez         HPI:  Patient is a 71 year old Male with significant PMH of HTN, chronic low back pain who came to the [facility] on 5/31 with c/o abdominal distension , LE swelling and decreased urination 2-3 days. Pt. c/o increased frequency of urination initially but for the last 2-3 days he noticed decreased urination. Also noticed abdominal distension. Of note, he was started on aldactone 25 mg PO OD and bumex 1 mg PO OD by his PCP 2-3 months ago for LE swelling. he denies hx of CHF CAD or arrhythmias. reports recent echo/ st was normal / Reports mild SOB on exertion rt back pain. no cp  ROS otherwise negative              PAST MEDICAL & SURGICAL HISTORY:  Hypertension              PREVIOUS DIAGNOSTIC TESTING:    [ ] Echocardiogram:  [ ]  Catheterization:  [ ] Stress Test:  	    MEDICATIONS:  Home Medications:  bumetanide 1 mg oral tablet: 1 tab(s) orally once a day (01 Jun 2022 09:00)  spironolactone 25 mg oral tablet: 1 tab(s) orally once a day (01 Jun 2022 09:00)      MEDICATIONS  (STANDING):  calcium gluconate IVPB 1 Gram(s) IV Intermittent once  dextrose 50% Injectable 25 Gram(s) IV Push once  heparin   Injectable 5000 Unit(s) SubCutaneous every 12 hours  insulin regular  human recombinant 5 Unit(s) IV Push once  labetalol 100 milliGRAM(s) Oral two times a day  pantoprazole    Tablet 40 milliGRAM(s) Oral before breakfast  sodium bicarbonate 650 milliGRAM(s) Oral two times a day  sodium chloride 0.9%. 1000 milliLiter(s) (100 mL/Hr) IV Continuous <Continuous>  sodium zirconium cyclosilicate 10 Gram(s) Oral once      FAMILY HISTORY:      SOCIAL HISTORY:    [ ] Non-smoker  [x ] Smoker  [ ] Alcohol    Allergies    No Known Allergies    Intolerances    	    REVIEW OF SYSTEMS:  CONSTITUTIONAL: No fever, weight loss, or fatigue  EYES: No eye pain, visual disturbances, or discharge  ENMT:  No difficulty hearing, tinnitus, vertigo; No sinus or throat pain  NECK: No pain or stiffness  RESPIRATORY: No cough, wheezing, chills or hemoptysis; No Shortness of Breath  CARDIOVASCULAR: No chest pain, palpitations, passing out, dizziness, or leg swelling  GASTROINTESTINAL:  see hpi   GENITOURINARY:  see hpi   NEUROLOGICAL: No headaches, memory loss, loss of strength, numbness, or tremors  SKIN: No itching, burning, rashes, or lesions   	    [x ] All others negative	  [ ] Unable to obtain    PHYSICAL EXAM:  T(C): 36.4 (06-01-22 @ 07:56), Max: 36.8 (06-01-22 @ 04:46)  HR: 79 (06-01-22 @ 07:56) (79 - 97)  BP: 128/66 (06-01-22 @ 07:56) (118/66 - 187/76)  RR: 18 (06-01-22 @ 07:56) (16 - 20)  SpO2: 92% (06-01-22 @ 07:56) (92% - 97%)  Wt(kg): --  I&O's Summary      Appearance: Normal	  Psychiatry: A & O x 3, Mood & affect appropriate  HEENT:   Normal oral mucosa, PERRL, EOMI	  Lymphatic: No lymphadenopathy  Cardiovascular: Normal S1 S2,RR  Respiratory: Lungs clear to auscultation	  Gastrointestinal:  Soft, Non-tender, + BS	  Skin: No rashes, No ecchymoses, No cyanosis	  Neurologic: Non-focal  Extremities: Normal range of motion, No clubbing, cyanosis or edema  Vascular: Peripheral pulses palpable 2+ bilaterally    TELEMETRY: 	    ECG:  nsr hr 83  possible old ant infarct 	  RADIOLOGY:  < from: US Kidney and Bladder (05.31.22 @ 22:26) >    IMPRESSION:    Underlying renal parenchymal disease. Bilateral renal cysts. Bilateral   perinephric fluid.    Bilateral moderate hydroureteronephrosis. Fine echoes within the left   renal collecting system. Echogenic material within the urinary bladder   may represent blood products. Correlate with urinalysis.    Prostatomegaly.        --- End of Report ---        < end of copied text >    OTHER: 	  	  LABS:	 	    CARDIAC MARKERS:                                  9.8    21.26 )-----------( 456      ( 01 Jun 2022 06:06 )             29.4     06-01    136  |  101  |  104<H>  ----------------------------<  94  6.3<HH>   |  15<L>  |  8.84<H>    Ca    10.6<H>      01 Jun 2022 06:06    TPro  9.1<H>  /  Alb  4.3  /  TBili  0.4  /  DBili  x   /  AST  10  /  ALT  10  /  AlkPhos  61  06-01      proBNP:   Lipid Profile:   HgA1c:   TSH:

## 2022-06-01 NOTE — PROGRESS NOTE ADULT - SUBJECTIVE AND OBJECTIVE BOX
Patient is a 71y old  Male who presents with a chief complaint of abd pain with obstructive uropathy (2022 09:01)      INTERVAL HPI/OVERNIGHT EVENTS:  T(C): 36.8 (22 @ 16:52), Max: 36.8 (22 @ 04:46)  HR: 81 (22 @ 16:52) (79 - 88)  BP: 147/74 (22 @ 16:52) (118/66 - 147/74)  RR: 18 (22 @ 16:52) (18 - 20)  SpO2: 92% (22 @ 16:52) (92% - 93%)  Wt(kg): --  I&O's Summary    2022 07:01  -  2022 19:40  --------------------------------------------------------  IN: 0 mL / OUT: 1900 mL / NET: -1900 mL        PAST MEDICAL & SURGICAL HISTORY:  Hypertension          SOCIAL HISTORY  Alcohol:  Tobacco:  Illicit substance use:    FAMILY HISTORY:    REVIEW OF SYSTEMS:  CONSTITUTIONAL: No fever, weight loss, or fatigue  EYES: No eye pain, visual disturbances, or discharge  ENMT:  No difficulty hearing, tinnitus, vertigo; No sinus or throat pain  NECK: No pain or stiffness  RESPIRATORY: No cough, wheezing, chills or hemoptysis; No shortness of breath  CARDIOVASCULAR: No chest pain, palpitations, dizziness, or leg swelling  GASTROINTESTINAL: No abdominal or epigastric pain. No nausea, vomiting, or hematemesis; No diarrhea or constipation. No melena or hematochezia.  GENITOURINARY: No dysuria, frequency, hematuria, or incontinence  NEUROLOGICAL: No headaches, memory loss, loss of strength, numbness, or tremors  SKIN: No itching, burning, rashes, or lesions   LYMPH NODES: No enlarged glands  ENDOCRINE: No heat or cold intolerance; No hair loss  MUSCULOSKELETAL: No joint pain or swelling; No muscle, back, or extremity pain  PSYCHIATRIC: No depression, anxiety, mood swings, or difficulty sleeping  HEME/LYMPH: No easy bruising, or bleeding gums  ALLERY AND IMMUNOLOGIC: No hives or eczema    RADIOLOGY & ADDITIONAL TESTS:    Imaging Personally Reviewed:  [ ] YES  [ ] NO    Consultant(s) Notes Reviewed:  [ ] YES  [ ] NO    PHYSICAL EXAM:  GENERAL: NAD, well-groomed, well-developed  HEAD:  Atraumatic, Normocephalic  EYES: EOMI, PERRLA, conjunctiva and sclera clear  ENMT: No tonsillar erythema, exudates, or enlargement; Moist mucous membranes, Good dentition, No lesions  NECK: Supple, No JVD, Normal thyroid  NERVOUS SYSTEM:  Alert & Oriented X3, Good concentration; Motor Strength 5/5 B/L upper and lower extremities; DTRs 2+ intact and symmetric  CHEST/LUNG: Clear to percussion bilaterally; No rales, rhonchi, wheezing, or rubs  HEART: Regular rate and rhythm; No murmurs, rubs, or gallops  ABDOMEN: Soft, Nontender, Nondistended; Bowel sounds present  EXTREMITIES:  2+ Peripheral Pulses, No clubbing, cyanosis, or edema  LYMPH: No lymphadenopathy noted  SKIN: No rashes or lesions    LABS:                        9.1    20.34 )-----------( 440      ( 2022 12:28 )             27.3     06-    138  |  102  |  98<H>  ----------------------------<  112<H>  5.0   |  21<L>  |  5.91<H>    Ca    10.6<H>      2022 16:45    TPro  9.1<H>  /  Alb  4.3  /  TBili  0.4  /  DBili  x   /  AST  10  /  ALT  10  /  AlkPhos  61  06-      Urinalysis Basic - ( 31 May 2022 18:15 )    Color: Colorless / Appearance: Clear / S.010 / pH: x  Gluc: x / Ketone: Negative  / Bili: Negative / Urobili: Negative   Blood: x / Protein: Negative / Nitrite: Negative   Leuk Esterase: Negative / RBC: 61 /hpf / WBC 1 /HPF   Sq Epi: x / Non Sq Epi: 0 /hpf / Bacteria: Negative      CAPILLARY BLOOD GLUCOSE      POCT Blood Glucose.: 209 mg/dL (2022 09:54)  POCT Blood Glucose.: 223 mg/dL (2022 09:40)  POCT Blood Glucose.: 105 mg/dL (2022 08:47)  POCT Blood Glucose.: 95 mg/dL (31 May 2022 19:56)        Urinalysis Basic - ( 31 May 2022 18:15 )    Color: Colorless / Appearance: Clear / S.010 / pH: x  Gluc: x / Ketone: Negative  / Bili: Negative / Urobili: Negative   Blood: x / Protein: Negative / Nitrite: Negative   Leuk Esterase: Negative / RBC: 61 /hpf / WBC 1 /HPF   Sq Epi: x / Non Sq Epi: 0 /hpf / Bacteria: Negative        MEDICATIONS  (STANDING):  heparin   Injectable 5000 Unit(s) SubCutaneous every 12 hours  labetalol 100 milliGRAM(s) Oral two times a day  pantoprazole    Tablet 40 milliGRAM(s) Oral before breakfast  sodium bicarbonate 650 milliGRAM(s) Oral two times a day  sodium chloride 0.45% 1000 milliLiter(s) (100 mL/Hr) IV Continuous <Continuous>    MEDICATIONS  (PRN):      Care Discussed with Consultants/Other Providers [ ] YES  [ ] NO

## 2022-06-01 NOTE — CONSULT NOTE ADULT - ASSESSMENT
71 year old Male with significant PMH of HTN, chronic low back pain presenting with c/o  urinary retention, AGUS, hyperkalemia, SOB    #sob  -pt associated it back pain   -leukocytosis noted-- Check chest xray   -check echo   -ecg with no ischemic changes   -hold diuretics given agus     #urinary retention/ agus   -chapman in place   -US kidney and bladder noted  -management per renal/ med   -lasix on hold     #hyperkalemia  -management per renal

## 2022-06-01 NOTE — PROVIDER CONTACT NOTE (CRITICAL VALUE NOTIFICATION) - ACTION/TREATMENT ORDERED:
NP made aware. Regular insulin 5 unit IVP, Dextrose 50% 25gm; Calcium Gluconate 1 gm IVPB and Lokemia 10 gm PO order. Will follow [facility] protocol to monitor.

## 2022-06-01 NOTE — PROGRESS NOTE ADULT - ASSESSMENT
A/P     # Obstructive uropathy / Ac renal failure :  -in ED chapman placed : over 2 ltr urine drain   check UA/ urine c/s   seen by nephrology   improving renal fx     # Metabolic acidosis   -started on IV fluids   started on sod. bicarb PO   improving     Anemia :   -check anemia panel     BPH / urinary out let obstruction   s/p chapman placed   start flomax q HS   -will eventually need urology consult    # Leukocytosis :  -likely reactive   UA is clean , CXR : no infiltrate   will monitor off abx     ac on ch back pain :  today MRI done , f/u result

## 2022-06-01 NOTE — CONSULT NOTE ADULT - TIME BILLING
Patient seen and examined, agree with the above assessment and plan by VILMA Milligan.  71 year old Male with significant PMH of HTN, chronic low back pain presenting with urinary retention, SOB, AGUS and, hyperkalemia due to urinary obstruction  Appreciate renal eval  Followup sono to rule out obstruction  SOB due to pain? overload. Lungs appear clear on exam with some LE edema  Hold diuretic for now  Please check CXR  ECG NSR w no significant changes  Check ECHO  Leucocytosis noted-treat for possible UTI/urosepsis per medicine  DVT

## 2022-06-02 LAB
ANION GAP SERPL CALC-SCNC: 15 MMOL/L — SIGNIFICANT CHANGE UP (ref 5–17)
BUN SERPL-MCNC: 73 MG/DL — HIGH (ref 7–23)
CALCIUM SERPL-MCNC: 10.5 MG/DL — SIGNIFICANT CHANGE UP (ref 8.4–10.5)
CHLORIDE SERPL-SCNC: 102 MMOL/L — SIGNIFICANT CHANGE UP (ref 96–108)
CO2 SERPL-SCNC: 23 MMOL/L — SIGNIFICANT CHANGE UP (ref 22–31)
CREAT SERPL-MCNC: 3.89 MG/DL — HIGH (ref 0.5–1.3)
EGFR: 16 ML/MIN/1.73M2 — LOW
GLUCOSE SERPL-MCNC: 105 MG/DL — HIGH (ref 70–99)
HCT VFR BLD CALC: 27.3 % — LOW (ref 39–50)
HGB BLD-MCNC: 9 G/DL — LOW (ref 13–17)
MCHC RBC-ENTMCNC: 29.3 PG — SIGNIFICANT CHANGE UP (ref 27–34)
MCHC RBC-ENTMCNC: 33 GM/DL — SIGNIFICANT CHANGE UP (ref 32–36)
MCV RBC AUTO: 88.9 FL — SIGNIFICANT CHANGE UP (ref 80–100)
NRBC # BLD: 0 /100 WBCS — SIGNIFICANT CHANGE UP (ref 0–0)
PLATELET # BLD AUTO: 413 K/UL — HIGH (ref 150–400)
POTASSIUM SERPL-MCNC: 4.3 MMOL/L — SIGNIFICANT CHANGE UP (ref 3.5–5.3)
POTASSIUM SERPL-SCNC: 4.3 MMOL/L — SIGNIFICANT CHANGE UP (ref 3.5–5.3)
RBC # BLD: 3.07 M/UL — LOW (ref 4.2–5.8)
RBC # FLD: 14.8 % — HIGH (ref 10.3–14.5)
SODIUM SERPL-SCNC: 140 MMOL/L — SIGNIFICANT CHANGE UP (ref 135–145)
WBC # BLD: 17.48 K/UL — HIGH (ref 3.8–10.5)
WBC # FLD AUTO: 17.48 K/UL — HIGH (ref 3.8–10.5)

## 2022-06-02 PROCEDURE — 71045 X-RAY EXAM CHEST 1 VIEW: CPT | Mod: 26

## 2022-06-02 PROCEDURE — 93306 TTE W/DOPPLER COMPLETE: CPT | Mod: 26

## 2022-06-02 RX ORDER — TAMSULOSIN HYDROCHLORIDE 0.4 MG/1
0.4 CAPSULE ORAL AT BEDTIME
Refills: 0 | Status: DISCONTINUED | OUTPATIENT
Start: 2022-06-02 | End: 2022-06-04

## 2022-06-02 RX ORDER — OXYCODONE HYDROCHLORIDE 5 MG/1
5 TABLET ORAL THREE TIMES A DAY
Refills: 0 | Status: DISCONTINUED | OUTPATIENT
Start: 2022-06-02 | End: 2022-06-04

## 2022-06-02 RX ORDER — ACETAMINOPHEN 500 MG
1000 TABLET ORAL ONCE
Refills: 0 | Status: COMPLETED | OUTPATIENT
Start: 2022-06-02 | End: 2022-06-02

## 2022-06-02 RX ORDER — LANOLIN ALCOHOL/MO/W.PET/CERES
3 CREAM (GRAM) TOPICAL ONCE
Refills: 0 | Status: COMPLETED | OUTPATIENT
Start: 2022-06-02 | End: 2022-06-03

## 2022-06-02 RX ORDER — FINASTERIDE 5 MG/1
5 TABLET, FILM COATED ORAL DAILY
Refills: 0 | Status: DISCONTINUED | OUTPATIENT
Start: 2022-06-02 | End: 2022-06-04

## 2022-06-02 RX ADMIN — HEPARIN SODIUM 5000 UNIT(S): 5000 INJECTION INTRAVENOUS; SUBCUTANEOUS at 06:46

## 2022-06-02 RX ADMIN — Medication 650 MILLIGRAM(S): at 17:25

## 2022-06-02 RX ADMIN — OXYCODONE HYDROCHLORIDE 5 MILLIGRAM(S): 5 TABLET ORAL at 16:45

## 2022-06-02 RX ADMIN — OXYCODONE HYDROCHLORIDE 5 MILLIGRAM(S): 5 TABLET ORAL at 16:08

## 2022-06-02 RX ADMIN — HEPARIN SODIUM 5000 UNIT(S): 5000 INJECTION INTRAVENOUS; SUBCUTANEOUS at 17:25

## 2022-06-02 RX ADMIN — FINASTERIDE 5 MILLIGRAM(S): 5 TABLET, FILM COATED ORAL at 23:41

## 2022-06-02 RX ADMIN — TAMSULOSIN HYDROCHLORIDE 0.4 MILLIGRAM(S): 0.4 CAPSULE ORAL at 23:40

## 2022-06-02 RX ADMIN — Medication 1000 MILLIGRAM(S): at 11:00

## 2022-06-02 RX ADMIN — Medication 400 MILLIGRAM(S): at 01:03

## 2022-06-02 RX ADMIN — PANTOPRAZOLE SODIUM 40 MILLIGRAM(S): 20 TABLET, DELAYED RELEASE ORAL at 06:42

## 2022-06-02 RX ADMIN — Medication 100 MILLIGRAM(S): at 06:42

## 2022-06-02 RX ADMIN — Medication 100 MILLIGRAM(S): at 17:25

## 2022-06-02 RX ADMIN — OXYCODONE HYDROCHLORIDE 5 MILLIGRAM(S): 5 TABLET ORAL at 23:41

## 2022-06-02 RX ADMIN — Medication 400 MILLIGRAM(S): at 10:22

## 2022-06-02 RX ADMIN — Medication 650 MILLIGRAM(S): at 06:42

## 2022-06-02 NOTE — PROGRESS NOTE ADULT - ASSESSMENT
A/P     # Obstructive uropathy / Ac renal failure :  -in ED chapman placed : over 2 ltr urine drain   check UA/ urine c/s : neg   seen by nephrology   improving renal fx     # Metabolic acidosis   resolved   d/c sod bicarb tab     Anemia :   -check anemia panel     BPH / urinary out let obstruction   s/p chapman placed   seen by urology   started on flomax and proscar   plan for d/c with chapman and f/u with urology as out pt    # Leukocytosis :  -likely reactive : coming down , still high   UA is clean , CXR : no infiltrate   will monitor off abx     ac on ch back pain :  today MRI done , f/u result : reviewed   shows moderate central canal stenosis at Lumbar spine and moderate at C-spine   pt. c/o pain , neurosurgery consult in am

## 2022-06-02 NOTE — CONSULT NOTE ADULT - REASON FOR ADMISSION
abd pain with obstructive uropathy

## 2022-06-02 NOTE — PROGRESS NOTE ADULT - SUBJECTIVE AND OBJECTIVE BOX
Patient is a 71y old  Male who presents with a chief complaint of abd pain with obstructive uropathy (02 Jun 2022 13:43)      INTERVAL HPI/OVERNIGHT EVENTS: doing better , renal fx continue to improve   T(C): 36.7 (06-02-22 @ 19:10), Max: 36.9 (06-02-22 @ 05:16)  HR: 81 (06-02-22 @ 19:10) (81 - 103)  BP: 122/68 (06-02-22 @ 19:10) (122/68 - 147/79)  RR: 18 (06-02-22 @ 19:10) (18 - 18)  SpO2: 90% (06-02-22 @ 19:10) (90% - 95%)  Wt(kg): --  I&O's Summary    01 Jun 2022 07:01  -  02 Jun 2022 07:00  --------------------------------------------------------  IN: 900 mL / OUT: 3900 mL / NET: -3000 mL    02 Jun 2022 07:01  -  02 Jun 2022 22:46  --------------------------------------------------------  IN: 720 mL / OUT: 1800 mL / NET: -1080 mL        PAST MEDICAL & SURGICAL HISTORY:  Hypertension          SOCIAL HISTORY  Alcohol:  Tobacco:  Illicit substance use:    FAMILY HISTORY:    REVIEW OF SYSTEMS:  CONSTITUTIONAL: No fever, weight loss, or fatigue  EYES: No eye pain, visual disturbances, or discharge  ENMT:  No difficulty hearing, tinnitus, vertigo; No sinus or throat pain  NECK: No pain or stiffness  RESPIRATORY: No cough, wheezing, chills or hemoptysis; No shortness of breath  CARDIOVASCULAR: No chest pain, palpitations, dizziness, or leg swelling  GASTROINTESTINAL: No abdominal or epigastric pain. No nausea, vomiting, or hematemesis; No diarrhea or constipation. No melena or hematochezia.  GENITOURINARY: No dysuria, frequency, hematuria, or incontinence  NEUROLOGICAL: No headaches, memory loss, loss of strength, numbness, or tremors  SKIN: No itching, burning, rashes, or lesions   LYMPH NODES: No enlarged glands  ENDOCRINE: No heat or cold intolerance; No hair loss  MUSCULOSKELETAL: No joint pain or swelling; No muscle, back, or extremity pain  PSYCHIATRIC: No depression, anxiety, mood swings, or difficulty sleeping  HEME/LYMPH: No easy bruising, or bleeding gums  ALLERY AND IMMUNOLOGIC: No hives or eczema    RADIOLOGY & ADDITIONAL TESTS:    Imaging Personally Reviewed:  [ ] YES  [ ] NO    Consultant(s) Notes Reviewed:  [ ] YES  [ ] NO    PHYSICAL EXAM:  GENERAL: NAD, well-groomed, well-developed  HEAD:  Atraumatic, Normocephalic  EYES: EOMI, PERRLA, conjunctiva and sclera clear  ENMT: No tonsillar erythema, exudates, or enlargement; Moist mucous membranes, Good dentition, No lesions  NECK: Supple, No JVD, Normal thyroid  NERVOUS SYSTEM:  Alert & Oriented X3, Good concentration; Motor Strength 5/5 B/L upper and lower extremities; DTRs 2+ intact and symmetric  CHEST/LUNG: Clear to percussion bilaterally; No rales, rhonchi, wheezing, or rubs  HEART: Regular rate and rhythm; No murmurs, rubs, or gallops  ABDOMEN: Soft, Nontender, Nondistended; Bowel sounds present  EXTREMITIES:  2+ Peripheral Pulses, No clubbing, cyanosis, or edema  LYMPH: No lymphadenopathy noted  SKIN: No rashes or lesions    LABS:                        9.0    17.48 )-----------( 413      ( 02 Jun 2022 06:04 )             27.3     06-02    140  |  102  |  73<H>  ----------------------------<  105<H>  4.3   |  23  |  3.89<H>    Ca    10.5      02 Jun 2022 06:04    TPro  9.1<H>  /  Alb  4.3  /  TBili  0.4  /  DBili  x   /  AST  10  /  ALT  10  /  AlkPhos  61  06-01        CAPILLARY BLOOD GLUCOSE                MEDICATIONS  (STANDING):  finasteride 5 milliGRAM(s) Oral daily  heparin   Injectable 5000 Unit(s) SubCutaneous every 12 hours  labetalol 100 milliGRAM(s) Oral two times a day  melatonin 3 milliGRAM(s) Oral once  pantoprazole    Tablet 40 milliGRAM(s) Oral before breakfast  sodium chloride 0.45% 1000 milliLiter(s) (100 mL/Hr) IV Continuous <Continuous>  tamsulosin 0.4 milliGRAM(s) Oral at bedtime    MEDICATIONS  (PRN):  oxyCODONE    IR 5 milliGRAM(s) Oral three times a day PRN Moderate-Severe Pain (4 - 10)      Care Discussed with Consultants/Other Providers [ ] YES  [ ] NO

## 2022-06-02 NOTE — CONSULT NOTE ADULT - ASSESSMENT
71 year old male with bilateral hydronephrosis, AGUS, urinary retention secondary to BPH    - 71 year old male with bilateral hydronephrosis, AGUS, urinary retention secondary to BPH    -trend creatinine, currently 3.9 from 12.9  -start patient on finasteride 5mg daily, also start flomax 0.4mg daily  -keep chapman catheter upon discharge  -pt may follow up with Dr Sidney Waldrop for further management of BPH  -case d/w Dr Tinsley

## 2022-06-02 NOTE — PROGRESS NOTE ADULT - ASSESSMENT
71 year old Male with significant PMH of HTN, chronic low back pain presenting with c/o  urinary retention, AGUS, hyperkalemia, SOB    #sob  -pt associated it back pain   -leukocytosis noted-- Check chest xray   -check echo   -ecg with no ischemic changes   -hold diuretics given agus     #urinary retention/ agus   -chapman in place   -US kidney and bladder noted  -management per renal/ med   -lasix on hold   -fu MRI    #hyperkalemia  -management per renal       71 year old Male with significant PMH of HTN, chronic low back pain presenting with c/o  urinary retention, AGUS, hyperkalemia, SOB    #sob  -pt associated it with back pain   -leukocytosis noted-- Check chest xray   -check echo   -ecg with no ischemic changes   -hold diuretics given agus     #urinary retention/ agus   -chapman in place   -US kidney and bladder noted  -management per renal/ med   -lasix on hold   -fu MRI    #hyperkalemia  -management per renal

## 2022-06-02 NOTE — PROGRESS NOTE ADULT - SUBJECTIVE AND OBJECTIVE BOX
CARDIOLOGY FOLLOW UP - Dr. Hernandez  DATE OF SERVICE: 6/2/22     CC co back pain   no cp or sob         REVIEW OF SYSTEMS:  CONSTITUTIONAL: No fever, weight loss, or fatigue  RESPIRATORY: No cough, wheezing, chills or hemoptysis; No Shortness of Breath  CARDIOVASCULAR: No chest pain, palpitations, passing out, dizziness, or leg swelling  GASTROINTESTINAL: No abdominal or epigastric pain. No nausea, vomiting, or hematemesis; No diarrhea or constipation. No melena or hematochezia.      PHYSICAL EXAM:  T(C): 36.9 (06-02-22 @ 05:16), Max: 36.9 (06-01-22 @ 20:11)  HR: 103 (06-02-22 @ 05:16) (79 - 103)  BP: 147/79 (06-02-22 @ 05:16) (123/67 - 147/79)  RR: 18 (06-02-22 @ 05:16) (18 - 20)  SpO2: 95% (06-02-22 @ 05:16) (92% - 95%)  Wt(kg): --  I&O's Summary    01 Jun 2022 07:01  -  02 Jun 2022 07:00  --------------------------------------------------------  IN: 800 mL / OUT: 3900 mL / NET: -3100 mL        Appearance: Normal	  Cardiovascular: Normal S1 S2,RRR, No JVD, No murmurs  Respiratory: Lungs clear to auscultation	  Gastrointestinal:  Soft, Non-tender, + BS	  Extremities: Normal range of motion, No clubbing, cyanosis or edema      Home Medications:  bumetanide 1 mg oral tablet: 1 tab(s) orally once a day (01 Jun 2022 09:06)  spironolactone 25 mg oral tablet: 1 tab(s) orally once a day (01 Jun 2022 09:06)      MEDICATIONS  (STANDING):  acetaminophen   IVPB .. 1000 milliGRAM(s) IV Intermittent once  heparin   Injectable 5000 Unit(s) SubCutaneous every 12 hours  labetalol 100 milliGRAM(s) Oral two times a day  melatonin 3 milliGRAM(s) Oral once  pantoprazole    Tablet 40 milliGRAM(s) Oral before breakfast  sodium bicarbonate 650 milliGRAM(s) Oral two times a day  sodium chloride 0.45% 1000 milliLiter(s) (100 mL/Hr) IV Continuous <Continuous>      TELEMETRY: nsr	    ECG:  	  RADIOLOGY:   DIAGNOSTIC TESTING:  [ ] Echocardiogram:  [ ]  Catheterization:  [ ] Stress Test:    OTHER: 	    LABS:	 	                            9.0    17.48 )-----------( 413      ( 02 Jun 2022 06:04 )             27.3     06-02    140  |  102  |  73<H>  ----------------------------<  105<H>  4.3   |  23  |  3.89<H>    Ca    10.5      02 Jun 2022 06:04    TPro  9.1<H>  /  Alb  4.3  /  TBili  0.4  /  DBili  x   /  AST  10  /  ALT  10  /  AlkPhos  61  06-01

## 2022-06-03 LAB
ANION GAP SERPL CALC-SCNC: 10 MMOL/L — SIGNIFICANT CHANGE UP (ref 5–17)
BUN SERPL-MCNC: 51 MG/DL — HIGH (ref 7–23)
CALCIUM SERPL-MCNC: 10 MG/DL — SIGNIFICANT CHANGE UP (ref 8.4–10.5)
CHLORIDE SERPL-SCNC: 101 MMOL/L — SIGNIFICANT CHANGE UP (ref 96–108)
CO2 SERPL-SCNC: 28 MMOL/L — SIGNIFICANT CHANGE UP (ref 22–31)
CREAT SERPL-MCNC: 2.89 MG/DL — HIGH (ref 0.5–1.3)
EGFR: 22 ML/MIN/1.73M2 — LOW
GLUCOSE SERPL-MCNC: 102 MG/DL — HIGH (ref 70–99)
HCT VFR BLD CALC: 25.9 % — LOW (ref 39–50)
HGB BLD-MCNC: 8.5 G/DL — LOW (ref 13–17)
MCHC RBC-ENTMCNC: 29.5 PG — SIGNIFICANT CHANGE UP (ref 27–34)
MCHC RBC-ENTMCNC: 32.8 GM/DL — SIGNIFICANT CHANGE UP (ref 32–36)
MCV RBC AUTO: 89.9 FL — SIGNIFICANT CHANGE UP (ref 80–100)
NRBC # BLD: 0 /100 WBCS — SIGNIFICANT CHANGE UP (ref 0–0)
PLATELET # BLD AUTO: 375 K/UL — SIGNIFICANT CHANGE UP (ref 150–400)
POTASSIUM SERPL-MCNC: 4 MMOL/L — SIGNIFICANT CHANGE UP (ref 3.5–5.3)
POTASSIUM SERPL-SCNC: 4 MMOL/L — SIGNIFICANT CHANGE UP (ref 3.5–5.3)
RBC # BLD: 2.88 M/UL — LOW (ref 4.2–5.8)
RBC # FLD: 14.8 % — HIGH (ref 10.3–14.5)
SODIUM SERPL-SCNC: 139 MMOL/L — SIGNIFICANT CHANGE UP (ref 135–145)
TROPONIN T, HIGH SENSITIVITY RESULT: 33 NG/L — SIGNIFICANT CHANGE UP (ref 0–51)
TROPONIN T, HIGH SENSITIVITY RESULT: 38 NG/L — SIGNIFICANT CHANGE UP (ref 0–51)
WBC # BLD: 14.9 K/UL — HIGH (ref 3.8–10.5)
WBC # FLD AUTO: 14.9 K/UL — HIGH (ref 3.8–10.5)

## 2022-06-03 PROCEDURE — 99233 SBSQ HOSP IP/OBS HIGH 50: CPT | Mod: GC

## 2022-06-03 RX ADMIN — Medication 100 MILLIGRAM(S): at 05:36

## 2022-06-03 RX ADMIN — OXYCODONE HYDROCHLORIDE 5 MILLIGRAM(S): 5 TABLET ORAL at 14:32

## 2022-06-03 RX ADMIN — OXYCODONE HYDROCHLORIDE 5 MILLIGRAM(S): 5 TABLET ORAL at 20:03

## 2022-06-03 RX ADMIN — PANTOPRAZOLE SODIUM 40 MILLIGRAM(S): 20 TABLET, DELAYED RELEASE ORAL at 05:37

## 2022-06-03 RX ADMIN — HEPARIN SODIUM 5000 UNIT(S): 5000 INJECTION INTRAVENOUS; SUBCUTANEOUS at 05:37

## 2022-06-03 RX ADMIN — OXYCODONE HYDROCHLORIDE 5 MILLIGRAM(S): 5 TABLET ORAL at 12:40

## 2022-06-03 RX ADMIN — Medication 100 MILLIGRAM(S): at 17:04

## 2022-06-03 RX ADMIN — OXYCODONE HYDROCHLORIDE 5 MILLIGRAM(S): 5 TABLET ORAL at 20:35

## 2022-06-03 RX ADMIN — OXYCODONE HYDROCHLORIDE 5 MILLIGRAM(S): 5 TABLET ORAL at 05:38

## 2022-06-03 RX ADMIN — HEPARIN SODIUM 5000 UNIT(S): 5000 INJECTION INTRAVENOUS; SUBCUTANEOUS at 17:04

## 2022-06-03 RX ADMIN — Medication 3 MILLIGRAM(S): at 21:59

## 2022-06-03 RX ADMIN — TAMSULOSIN HYDROCHLORIDE 0.4 MILLIGRAM(S): 0.4 CAPSULE ORAL at 21:56

## 2022-06-03 RX ADMIN — FINASTERIDE 5 MILLIGRAM(S): 5 TABLET, FILM COATED ORAL at 17:04

## 2022-06-03 NOTE — PROGRESS NOTE ADULT - PROBLEM SELECTOR PLAN 1
t. with AGUS in the setting of urinary retention. On review of [facility], it was noted that Scr was 1.29 on 5/4/22. Scr was elevated to 2 on 5/18. Scr was significantly elevated to 12.9 today. Also noted serum PSA was elevated to 7.6 in 2020. Chapman cath was placed and 2L of urine was drained. Renal US initially showed Bilateral moderate hydroureteronephrosis. Pt. with chapman catheter in place and most recent Scr of 2.89. Monitor labs and urine output. Avoid NSAIDs, ACEI/ARBS, RCA and nephrotoxins. Dose medications as per eGFR.

## 2022-06-03 NOTE — PROGRESS NOTE ADULT - ATTENDING COMMENTS
no new complaints  1.  ARF--improving, non oliguric, no HD  2.  Obstructive uropathy--urinary decompression, urology f/u  3.  Hyperkalemia--improved with 2 addressed--> magaly responsiveness.  No additional med rx  4.  Hematuria--improving, no CBI indicated  discussed with med team

## 2022-06-03 NOTE — PROGRESS NOTE ADULT - SUBJECTIVE AND OBJECTIVE BOX
[facility] DIVISION OF KIDNEY DISEASES AND HYPERTENSION -- FOLLOW UP NOTE  --------------------------------------------------------------------------------  qi is a 71 year old Male with significant PMH of HTN, chronic low back pain who came to the [facility] on 5/31 with c/o abdominal distension , LE swelling and decreased urination 2-3 days. Pt. c/o increased frequency of urination initially but for the last 2-3 days he noticed decreased urination. . Initial labs in ER, showed elevated Scr of 12.9 with hyperkalemia. Nephrology consulted for AGUS and hyper kalemia. On review of [facility], it was noted that Scr was 1.29 on 5/4/22. Scr was elevated to 2 on 5/18. Scr was significantly elevated to 12.9 today. Also noted serum PSA was elevated to 7.6 in 2020. Fraire cath was placed and 2L of urine was drained. Since relief of obstruction the patient has improving SCr to 2.89mg/dL most recently.     Pt. seen and examined this morning. Reports feeling well wihtou any acute complaints. No acute issues noted overnight.       PAST HISTORY  --------------------------------------------------------------------------------  No significant changes to PMH, PSH, FHx, SHx, unless otherwise noted    ALLERGIES & MEDICATIONS  --------------------------------------------------------------------------------  Allergies    No Known Drug Allergies  walnut (Faint; Urticaria; Anaphylaxis (Severe); Short breath; Hives; Headache; Rash)    Intolerances      Standing Inpatient Medications  finasteride 5 milliGRAM(s) Oral daily  heparin   Injectable 5000 Unit(s) SubCutaneous every 12 hours  labetalol 100 milliGRAM(s) Oral two times a day  melatonin 3 milliGRAM(s) Oral once  pantoprazole    Tablet 40 milliGRAM(s) Oral before breakfast  sodium chloride 0.45% 1000 milliLiter(s) IV Continuous <Continuous>  tamsulosin 0.4 milliGRAM(s) Oral at bedtime    PRN Inpatient Medications  oxyCODONE    IR 5 milliGRAM(s) Oral three times a day PRN      REVIEW OF SYSTEMS      All other systems were reviewed and are negative, except as noted.    VITALS/PHYSICAL EXAM  --------------------------------------------------------------------------------  T(C): 36.8 (06-03-22 @ 04:12), Max: 36.8 (06-02-22 @ 13:30)  HR: 97 (06-03-22 @ 04:12) (81 - 97)  BP: 149/69 (06-03-22 @ 04:12) (122/68 - 149/69)  RR: 18 (06-03-22 @ 04:12) (18 - 18)  SpO2: 94% (06-03-22 @ 04:12) (90% - 94%)  Wt(kg): --        06-02-22 @ 07:01  -  06-03-22 @ 07:00  --------------------------------------------------------  IN: 1820 mL / OUT: 2100 mL / NET: -280 mL    06-03-22 @ 07:01  -  06-03-22 @ 10:20  --------------------------------------------------------  IN: 0 mL / OUT: 350 mL / NET: -350 mL        Physical Exam:  	Gen: NAD  	HEENT: MMM  	Pulm: CTA B/L  	CV: S1S2  	Abd: Soft, +BS   	Ext: No LE edema B/L  	Neuro: Awake  	Skin: Warm and dry      LABS/STUDIES  --------------------------------------------------------------------------------              8.5    14.90 >-----------<  375      [06-03-22 @ 05:51]              25.9     139  |  101  |  51  ----------------------------<  102      [06-03-22 @ 05:50]  4.0   |  28  |  2.89        Ca     10.0     [06-03-22 @ 05:50]            Creatinine Trend:  SCr 2.89 [06-03 @ 05:50]  SCr 3.89 [06-02 @ 06:04]  SCr 4.84 [06-01 @ 22:58]  SCr 5.91 [06-01 @ 16:45]  SCr 6.87 [06-01 @ 12:28]    Urinalysis - [05-31-22 @ 18:15]      Color Colorless / Appearance Clear / SG 1.010 / pH 6.0      Gluc Negative / Ketone Negative  / Bili Negative / Urobili Negative       Blood Moderate / Protein Negative / Leuk Est Negative / Nitrite Negative      RBC 61 / WBC 1 / Hyaline  / Gran  / Sq Epi  / Non Sq Epi 0 / Bacteria Negative        HCV 0.09, Nonreact      [06-01-22 @ 06:06]

## 2022-06-03 NOTE — PATIENT PROFILE ADULT - FALL HARM RISK - HARM RISK INTERVENTIONS

## 2022-06-03 NOTE — PROGRESS NOTE ADULT - PROBLEM SELECTOR PLAN 2
Pt. with  hyperkalemia in the setting fo AGUS. Serum potassium on admission elevated to 6.4(non hemolyzed). Received medical management.- insulin , D50, lokelma, calcium gluconate. Most recently 4.0. Monitor serum K.    If any questions, please feel free to contact me     Armin Mendes  Nephrology Fellow  [facility] Pager: [phone]

## 2022-06-03 NOTE — PROGRESS NOTE ADULT - SUBJECTIVE AND OBJECTIVE BOX
CARDIOLOGY FOLLOW UP - Dr. Hernandez  DATE OF SERVICE: 6/3/22     CC no cp or sob      REVIEW OF SYSTEMS:  CONSTITUTIONAL: No fever, weight loss, or fatigue  RESPIRATORY: No cough, wheezing, chills or hemoptysis; No Shortness of Breath  CARDIOVASCULAR: No chest pain, palpitations, passing out, dizziness, or leg swelling  GASTROINTESTINAL: No abdominal or epigastric pain. No nausea, vomiting, or hematemesis; No diarrhea or constipation. No melena or hematochezia.  VASCULAR: No edema     PHYSICAL EXAM:  T(C): 36.8 (06-03-22 @ 04:12), Max: 36.8 (06-02-22 @ 13:30)  HR: 97 (06-03-22 @ 04:12) (81 - 97)  BP: 149/69 (06-03-22 @ 04:12) (122/68 - 149/69)  RR: 18 (06-03-22 @ 04:12) (18 - 18)  SpO2: 94% (06-03-22 @ 04:12) (90% - 94%)  Wt(kg): --  I&O's Summary    02 Jun 2022 07:01  -  03 Jun 2022 07:00  --------------------------------------------------------  IN: 1820 mL / OUT: 2100 mL / NET: -280 mL    03 Jun 2022 07:01  -  03 Jun 2022 09:10  --------------------------------------------------------  IN: 0 mL / OUT: 350 mL / NET: -350 mL        Appearance: Normal	  Cardiovascular: Normal S1 S2,RRR, No JVD, No murmurs  Respiratory: Lungs clear to auscultation	  Gastrointestinal:  Soft, Non-tender, + BS	  Extremities: Normal range of motion, No clubbing, cyanosis or edema      Home Medications:  bumetanide 1 mg oral tablet: 1 tab(s) orally once a day (01 Jun 2022 09:06)  spironolactone 25 mg oral tablet: 1 tab(s) orally once a day (01 Jun 2022 09:06)      MEDICATIONS  (STANDING):  finasteride 5 milliGRAM(s) Oral daily  heparin   Injectable 5000 Unit(s) SubCutaneous every 12 hours  labetalol 100 milliGRAM(s) Oral two times a day  melatonin 3 milliGRAM(s) Oral once  pantoprazole    Tablet 40 milliGRAM(s) Oral before breakfast  sodium chloride 0.45% 1000 milliLiter(s) (100 mL/Hr) IV Continuous <Continuous>  tamsulosin 0.4 milliGRAM(s) Oral at bedtime      TELEMETRY: nsr 	    ECG:  	  RADIOLOGY:   \< from: MR Lumbar Spine No Cont (06.01.22 @ 19:58) >    ACC: [id number] EXAM:  MR SPINE LUMBAR                        ACC: [id number] EXAM:  MR SPINE THORACIC                        ACC: [id number] EXAM:  MR SPINE CERVICAL                          PROCEDURE DATE:  06/01/2022          INTERPRETATION:  MR CERVICAL SPINE WITHOUT CONTRAST  MR THORACIC SPINE WITHOUT CONTRAST  MR LUMBAR SPINE WITHOUT CONTRAST    CLINICAL HISTORY: Swelling lower back pain. Urinary retention.    TECHNIQUE: Multiphasic sagittal non-contrast magnetic resonance imaging   of the cervical, thoracic, and lumbar spine was performed. Axial   sequences were also obtained    COMPARISON: Ultrasound abdomen 5/31/2022    FINDINGS:    The marrow is diffusely hypointense throughout the cervical, thoracic and   lumbar spine which is nonspecific and may be related to chronic anemia. A   diffuse marrow process not entirely excluded.    CERVICAL SPINE:    There is straightening of the usual cervical lordosis. The vertebral body   heights and alignment are maintained. No focal STIR hyperintense marrow   replacing lesion. There is severe loss of intervertebral disc height at   C5-C6 and C6-C7. There are associated degenerative endplate changes.    Multilevel diffuse disc osteophyte complexes contribute to central canal   narrowing throughout the cervical spine to varying degrees. Notably, At   C5-C6 there is a diffuse disc osteophyte complex and ligamentum flavum   thickening which mildly indents the ventral cord and contributes to   moderate central canal stenosis. There is no underlying cord signal   abnormality throughout cervical spine.    Multilevel facet hypertrophy and uncovertebral spurring contribute to   neural foraminal narrowing throughout the cervical spine to varying   degrees. At C3-C4 there is moderate right and severe left neural   foraminal stenosis. At C5-C6 there is severe right and moderate left   neural foraminal stenosis. At C6-C7 there is moderate bilateral neural   foraminal stenosis.    THORACIC SPINE:    Normal thoracic kyphosis is maintained. Vertebral body height is   maintained throughout the thoracic spine. No focal STIR hyperintense   marrow replacing lesion. Thoracic Alignment is maintained. A few mild   multilevel disc bulges are present. There is no significant thoracic   spinal canal stenosis. No significant neural foraminal stenosis. There is   no significant thoracic spinal cord signal abnormality.    Lumbar Spine:  Normal lumbar lordosis is maintained. Vertebral body   alignment is maintained. No focal STIR hyperintense marrow replacing   lesion. At L1 there is mild loss of vertebral body height and anterior   wedging without underlying marrow edema consistent with a mild chronic   compression deformity. There is a superimposed Schmorl's node along the   superior endplate at T12. At L2 there is moderate loss of vertebral body   height and anterior wedging, without underlying marrow edema consistent   with chronic moderate compression deformity. At L4 there is mild loss of   vertebral body height and depression of the superior endplate without   underlying marrow edema consistent with a chronic mild compression   deformity. At L5 there is mild to moderate loss of vertebral body height   without underlying marrow edema consistent with a chronic compression   deformity. There is moderate disc space narrowing at L5-S1.    The conus medullaris terminates at L2.    At L1-L2 there is a broad based central protrusion and ligamentum flavum   thickening which contribute to moderate central canal stenosis. Moderate   left andmild right neural foraminal narrowing.    At L2-L3 there is a mild disc bulge which contributes to mild central   canal narrowing. No significant subarticular zone or neural foraminal   stenosis.    At L3-L4 there is a disc bulge and ligamentum flavum thickening which   contribute to mild central canal stenosis and moderate bilateral   subarticular zone stenosis.    At L4-L5 there is prominent disc bulge, ligamentum flavum thickening and   facet hypertrophy which contribute to severe central canal stenosis, and   moderate severe right neural foraminal stenosis.    L5-S1 there is a disc bulge which indents the ventral thecal sac and   contributes to mild central canal stenosis. There is moderate to severe   right neural foraminal stenosis.    There is a 1.2 cm nodule in the left lobe of thyroid gland. Bilateral   moderate hydroureteronephrosis.    IMPRESSION:  MR cervical spine: Degenerative changes and multilevel central canal and   neural foraminal narrowing as described. Notably there is moderate   central canal stenosis at C5-C6. There is no cervical cord signal   abnormality.    MR thoracic spine: Degenerative changes as described. No high-grade   central canal stenosis. No thoracic cord signal abnormality.    MR lumbar spine: Degenerative changes and multilevel chronic compression   deformities as described. At L1-L2 there is moderate central canal   stenosis. At L4-L5 there is severe central canal stenosis.    Diffusely hypointense marrow signal throughout the cervical, thoracic and   lumbar spine which is nonspecific and may be related to chronic anemia.   Other diffuse marrow processes are not entirely excluded. Recommend   correlation with clinical aspects of the case.  Bilateral moderate hydroureteronephrosis.    ---End of Report ---            < end of copied text >    DIAGNOSTIC TESTING:  [ ] Echocardiogram:  < from: TTE with Doppler (w/Cont) (06.02.22 @ 10:08) >  EF (Modified Schroeder Rule): 46 %Doppler Peak Velocity  (m/sec): AoV=1.6  ------------------------------------------------------------------------  Observations:  Mitral Valve: Normal mitral valve. Mild mitral  regurgitation.  Aortic Valve/Aorta: Calcified trileaflet aortic valve with  normal opening. Peak transaortic valve gradient equals 10  mm Hg. Minimal aortic regurgitation. Peak left ventricular  outflow tract gradient equals 5 mm Hg.  Aortic Root: 3 cm.  Left Atrium: Mildly dilated left atrium.  LA volume index =  36 cc/m2.  Left Ventricle: Endocardial visualization enhanced with  intravenous injection of Ultrasonic Enhancing Agent  (Lumason).  Mild segmental left ventricular systolic  dysfunction. The distal septum, distal inferior, and the  apex are akinetic.The mid septum is hypokinetic.  No left  ventricular thrombus. Normal left ventricular internal  dimensions and wall thicknesses. Moderate diastolic  dysfunction (Stage II).  Right Heart: Normal right atrium. Normal right ventricular  size and function. Normal tricuspid valve. Minimal  tricuspid regurgitation. Pulmonic valve not well  visualized, probably normal. No pulmonic regurgitation.  Pericardium/Pleura: Trace-small pericardial effusion  adjacent to the apex.  Hemodynamic: Estimated right atrial pressure equals 3 mmHg.  Inadequate tricuspid regurgitation Doppler envelope  precludes estimation of RVSP.  ------------------------------------------------------------------------  Conclusions:  1. Normal mitral valve. Mild mitral regurgitation.  2. Calcified trileaflet aortic valve with normal opening.  Minimal aortic regurgitation.  3. Endocardial visualization enhanced with intravenous  injection of Ultrasonic Enhancing Agent (Lumason).  Mild  segmental left ventricular systolic dysfunction. The distal  septum, distal inferior, and the apex are akinetic.The mid  septum is hypokinetic.  No left ventricular thrombus.  4. Normal right ventricular size and function.  5. Trace-small pericardial effusion adjacent to the apex.  *** No previous Echo exam.  ------------------------------------------------------------------------  Confirmed on  6/2/2022 - 11:58:39 by Micky Smith M.D.  ------------------------------------------------------------------------    < end of copied text >    [ ]  Catheterization:  [ ] Stress Test:    OTHER: 	    LABS:	 	                            8.5    14.90 )-----------( 375      ( 03 Jun 2022 05:51 )             25.9     06-03    139  |  101  |  51<H>  ----------------------------<  102<H>  4.0   |  28  |  2.89<H>    Ca    10.0      03 Jun 2022 05:50

## 2022-06-03 NOTE — PROGRESS NOTE ADULT - ASSESSMENT
Echo 6/2/22:  EF 46%; mild mr,  Mild segmental left ventricular systolic dysfunction. The distal septum, distal inferior, and the apex are akinetic.The mid septum is hypokinetic.  No left ventricular thrombus. Trace-small pericardial effusion adjacent to the apex. Moderate diastolic dysfunction (Stage II).  *** No previous Echo exam.       a/p  71 year old Male with significant PMH of HTN, chronic low back pain presenting with c/o  urinary retention, AGUS, hyperkalemia, SOB    #sob  -pt associated it with back pain   -leukocytosis improving -- chest xray clear  -echo ef 46% Mild segmental left ventricular systolic dysfunction. The distal septum, distal inferior, and the apex are akinetic.The mid septum is hypokinetic.  No left ventricular thrombus. Trace-small pericardial effusion adjacent to the apex. Moderate diastolic dysfunction (Stage II).  -ecg with no ischemic changes   -hold diuretics given agus , no chf on exam     #Systolic CHF   -echo ef 46% Mild segmental left ventricular systolic dysfunction. The distal septum, distal inferior, and the apex are akinetic.The mid septum is hypokinetic.  No left ventricular thrombus. Trace-small pericardial effusion adjacent to the apex. Moderate diastolic dysfunction (Stage II).   -? new ICMP no previous echo to compare  -diuretics / acei or arb on hold given AGUS   -c/w BB   -if pt/ wife agreeable, plan for stress testing for further ischemic eval    #urinary retention/ agus   -chapman in place   - kidney and bladder noted  -management per renal/ med   -lasix on hold   -MRI spine noted     #hyperkalemia  -management per renal       Echo 6/2/22:  EF 46%; mild mr,  Mild segmental left ventricular systolic dysfunction. The distal septum, distal inferior, and the apex are akinetic.The mid septum is hypokinetic.  No left ventricular thrombus. Trace-small pericardial effusion adjacent to the apex. Moderate diastolic dysfunction (Stage II).  *** No previous Echo exam.       a/p  71 year old Male with significant PMH of HTN, chronic low back pain presenting with c/o  urinary retention, AGUS, hyperkalemia, SOB    #sob  -pt associated it with back pain   -leukocytosis improving -- chest xray clear  -echo ef 46% Mild segmental left ventricular systolic dysfunction. The distal septum, distal inferior, and the apex are akinetic.The mid septum is hypokinetic.  No left ventricular thrombus. Trace-small pericardial effusion adjacent to the apex. Moderate diastolic dysfunction (Stage II).  -ecg with no ischemic changes   -hold diuretics given agus , no chf on exam     #Systolic CHF   -echo ef 46% Mild segmental left ventricular systolic dysfunction. The distal septum, distal inferior, and the apex are akinetic.The mid septum is hypokinetic.  No left ventricular thrombus. Trace-small pericardial effusion adjacent to the apex. Moderate diastolic dysfunction (Stage II).   -? new ICMP no previous echo to compare  -diuretics / acei or arb on hold given AGUS   -c/w BB   -pt/ wife wants to pursue further ischemic eval- plan for st     #urinary retention/ agus   -chapman in place   - kidney and bladder noted  -management per renal/ med   -lasix on hold   -MRI spine noted   -hyperkalemia- resolved management per renal     #spinal stenosis   -MRI noted- med fu -neurosurgery eval pending

## 2022-06-03 NOTE — PROGRESS NOTE ADULT - SUBJECTIVE AND OBJECTIVE BOX
Patient is a 71y old  Male who presents with a chief complaint of abd pain with obstructive uropathy (03 Jun 2022 10:19)      INTERVAL HPI/OVERNIGHT EVENTS:  T(C): 36.9 (06-03-22 @ 19:45), Max: 36.9 (06-03-22 @ 12:25)  HR: 91 (06-03-22 @ 19:45) (85 - 97)  BP: 119/58 (06-03-22 @ 19:45) (119/58 - 149/69)  RR: 18 (06-03-22 @ 19:45) (18 - 18)  SpO2: 90% (06-03-22 @ 19:45) (90% - 94%)  Wt(kg): --  I&O's Summary    02 Jun 2022 07:01  -  03 Jun 2022 07:00  --------------------------------------------------------  IN: 1820 mL / OUT: 2100 mL / NET: -280 mL    03 Jun 2022 07:01  -  03 Jun 2022 23:15  --------------------------------------------------------  IN: 1160 mL / OUT: 3500 mL / NET: -2340 mL        PAST MEDICAL & SURGICAL HISTORY:  Hypertension          SOCIAL HISTORY  Alcohol:  Tobacco:  Illicit substance use:    FAMILY HISTORY:    REVIEW OF SYSTEMS:  CONSTITUTIONAL: No fever, weight loss, or fatigue  EYES: No eye pain, visual disturbances, or discharge  ENMT:  No difficulty hearing, tinnitus, vertigo; No sinus or throat pain  NECK: No pain or stiffness  RESPIRATORY: No cough, wheezing, chills or hemoptysis; No shortness of breath  CARDIOVASCULAR: No chest pain, palpitations, dizziness, or leg swelling  GASTROINTESTINAL: No abdominal or epigastric pain. No nausea, vomiting, or hematemesis; No diarrhea or constipation. No melena or hematochezia.  GENITOURINARY: No dysuria, frequency, hematuria, or incontinence  NEUROLOGICAL: No headaches, memory loss, loss of strength, numbness, or tremors  SKIN: No itching, burning, rashes, or lesions   LYMPH NODES: No enlarged glands  ENDOCRINE: No heat or cold intolerance; No hair loss  MUSCULOSKELETAL: No joint pain or swelling; No muscle, back, or extremity pain  PSYCHIATRIC: No depression, anxiety, mood swings, or difficulty sleeping  HEME/LYMPH: No easy bruising, or bleeding gums  ALLERY AND IMMUNOLOGIC: No hives or eczema    RADIOLOGY & ADDITIONAL TESTS:    Imaging Personally Reviewed:  [ ] YES  [ ] NO    Consultant(s) Notes Reviewed:  [ ] YES  [ ] NO    PHYSICAL EXAM:  GENERAL: NAD, well-groomed, well-developed  HEAD:  Atraumatic, Normocephalic  EYES: EOMI, PERRLA, conjunctiva and sclera clear  ENMT: No tonsillar erythema, exudates, or enlargement; Moist mucous membranes, Good dentition, No lesions  NECK: Supple, No JVD, Normal thyroid  NERVOUS SYSTEM:  Alert & Oriented X3, Good concentration; Motor Strength 5/5 B/L upper and lower extremities; DTRs 2+ intact and symmetric  CHEST/LUNG: Clear to percussion bilaterally; No rales, rhonchi, wheezing, or rubs  HEART: Regular rate and rhythm; No murmurs, rubs, or gallops  ABDOMEN: Soft, Nontender, Nondistended; Bowel sounds present  EXTREMITIES:  2+ Peripheral Pulses, No clubbing, cyanosis, or edema  LYMPH: No lymphadenopathy noted  SKIN: No rashes or lesions    LABS:                        8.5    14.90 )-----------( 375      ( 03 Jun 2022 05:51 )             25.9     06-03    139  |  101  |  51<H>  ----------------------------<  102<H>  4.0   |  28  |  2.89<H>    Ca    10.0      03 Jun 2022 05:50          CAPILLARY BLOOD GLUCOSE                MEDICATIONS  (STANDING):  finasteride 5 milliGRAM(s) Oral daily  heparin   Injectable 5000 Unit(s) SubCutaneous every 12 hours  labetalol 100 milliGRAM(s) Oral two times a day  pantoprazole    Tablet 40 milliGRAM(s) Oral before breakfast  sodium chloride 0.45% 1000 milliLiter(s) (100 mL/Hr) IV Continuous <Continuous>  tamsulosin 0.4 milliGRAM(s) Oral at bedtime    MEDICATIONS  (PRN):  oxyCODONE    IR 5 milliGRAM(s) Oral three times a day PRN Moderate-Severe Pain (4 - 10)      Care Discussed with Consultants/Other Providers [ ] YES  [ ] NO Patient is a 71y old  Male who presents with a chief complaint of abd pain with obstructive uropathy (03 Jun 2022 10:19)      INTERVAL HPI/OVERNIGHT EVENTS: doing well , hematuria resolved in chapman bag   T(C): 36.9 (06-03-22 @ 19:45), Max: 36.9 (06-03-22 @ 12:25)  HR: 91 (06-03-22 @ 19:45) (85 - 97)  BP: 119/58 (06-03-22 @ 19:45) (119/58 - 149/69)  RR: 18 (06-03-22 @ 19:45) (18 - 18)  SpO2: 90% (06-03-22 @ 19:45) (90% - 94%)  Wt(kg): --  I&O's Summary    02 Jun 2022 07:01  -  03 Jun 2022 07:00  --------------------------------------------------------  IN: 1820 mL / OUT: 2100 mL / NET: -280 mL    03 Jun 2022 07:01  -  03 Jun 2022 23:15  --------------------------------------------------------  IN: 1160 mL / OUT: 3500 mL / NET: -2340 mL        PAST MEDICAL & SURGICAL HISTORY:  Hypertension          SOCIAL HISTORY  Alcohol:  Tobacco:  Illicit substance use:    FAMILY HISTORY:    REVIEW OF SYSTEMS:  CONSTITUTIONAL: No fever, weight loss, or fatigue  EYES: No eye pain, visual disturbances, or discharge  ENMT:  No difficulty hearing, tinnitus, vertigo; No sinus or throat pain  NECK: No pain or stiffness  RESPIRATORY: No cough, wheezing, chills or hemoptysis; No shortness of breath  CARDIOVASCULAR: No chest pain, palpitations, dizziness, or leg swelling  GASTROINTESTINAL: No abdominal or epigastric pain. No nausea, vomiting, or hematemesis; No diarrhea or constipation. No melena or hematochezia.  GENITOURINARY: No dysuria, frequency, hematuria, or incontinence  NEUROLOGICAL: No headaches, memory loss, loss of strength, numbness, or tremors  SKIN: No itching, burning, rashes, or lesions   LYMPH NODES: No enlarged glands  ENDOCRINE: No heat or cold intolerance; No hair loss  MUSCULOSKELETAL: No joint pain or swelling; No muscle, back, or extremity pain  PSYCHIATRIC: No depression, anxiety, mood swings, or difficulty sleeping  HEME/LYMPH: No easy bruising, or bleeding gums  ALLERY AND IMMUNOLOGIC: No hives or eczema    RADIOLOGY & ADDITIONAL TESTS:    Imaging Personally Reviewed:  [ ] YES  [ ] NO    Consultant(s) Notes Reviewed:  [ ] YES  [ ] NO    PHYSICAL EXAM:  GENERAL: NAD, well-groomed, well-developed  HEAD:  Atraumatic, Normocephalic  EYES: EOMI, PERRLA, conjunctiva and sclera clear  ENMT: No tonsillar erythema, exudates, or enlargement; Moist mucous membranes, Good dentition, No lesions  NECK: Supple, No JVD, Normal thyroid  NERVOUS SYSTEM:  Alert & Oriented X3, Good concentration; Motor Strength 5/5 B/L upper and lower extremities; DTRs 2+ intact and symmetric  CHEST/LUNG: Clear to percussion bilaterally; No rales, rhonchi, wheezing, or rubs  HEART: Regular rate and rhythm; No murmurs, rubs, or gallops  ABDOMEN: Soft, Nontender, Nondistended; Bowel sounds present  EXTREMITIES:  2+ Peripheral Pulses, No clubbing, cyanosis, or edema  LYMPH: No lymphadenopathy noted  SKIN: No rashes or lesions    LABS:                        8.5    14.90 )-----------( 375      ( 03 Jun 2022 05:51 )             25.9     06-03    139  |  101  |  51<H>  ----------------------------<  102<H>  4.0   |  28  |  2.89<H>    Ca    10.0      03 Jun 2022 05:50          CAPILLARY BLOOD GLUCOSE                MEDICATIONS  (STANDING):  finasteride 5 milliGRAM(s) Oral daily  heparin   Injectable 5000 Unit(s) SubCutaneous every 12 hours  labetalol 100 milliGRAM(s) Oral two times a day  pantoprazole    Tablet 40 milliGRAM(s) Oral before breakfast  sodium chloride 0.45% 1000 milliLiter(s) (100 mL/Hr) IV Continuous <Continuous>  tamsulosin 0.4 milliGRAM(s) Oral at bedtime    MEDICATIONS  (PRN):  oxyCODONE    IR 5 milliGRAM(s) Oral three times a day PRN Moderate-Severe Pain (4 - 10)      Care Discussed with Consultants/Other Providers [ ] YES  [ ] NO

## 2022-06-03 NOTE — PROGRESS NOTE ADULT - TIME BILLING
Agree with above NP note.  echo ef 46% Mild segmental left ventricular systolic dysfunction. The distal septum, distal inferior, and the apex are akinetic.The mid septum is hypokinetic.  No left ventricular thrombus. Trace-small pericardial effusion adjacent to the apex. Moderate diastolic dysfunction (Stage II).  plan for stress to eval for ischemia  cv stable  f/u echo   urology w/u  diuretics on hold   DVT ppx
Agree with above NP note.  71 year old Male with significant PMH of HTN, chronic low back pain presenting with urinary retention, SOB, AGUS and, hyperkalemia due to urinary obstruction    cv stable  f/u echo   renal f/u  urology w/u  diuretics on hold   DVT ppx

## 2022-06-03 NOTE — PROGRESS NOTE ADULT - ASSESSMENT
A/P     # Obstructive uropathy / Ac renal failure :  -in ED chapman placed : over 2 ltr urine drain   check UA/ urine c/s : neg   seen by nephrology   improving renal fx     # Metabolic acidosis   resolved   d/c sod bicarb tab     Anemia :   -check anemia panel     BPH / urinary out let obstruction   s/p chapman placed   seen by urology   started on flomax and proscar   plan for d/c with chapman and f/u with urology as out pt    # Leukocytosis :  -likely reactive : coming down , still high   UA is clean , CXR : no infiltrate   will monitor off abx     ac on ch back pain :  today MRI done , f/u result : reviewed   shows moderate central canal stenosis at Lumbar spine and moderate at C-spine   f/u with spine surgery as out pt    dispo: renal fx significantly improved , c/w chapman cath on d/c , f/u with urology as out pt.   plan for d/c

## 2022-06-04 ENCOUNTER — TRANSCRIPTION ENCOUNTER (OUTPATIENT)
Age: 72
End: 2022-06-04

## 2022-06-04 VITALS — OXYGEN SATURATION: 92 % | DIASTOLIC BLOOD PRESSURE: 68 MMHG | HEART RATE: 85 BPM | SYSTOLIC BLOOD PRESSURE: 129 MMHG

## 2022-06-04 LAB
ANION GAP SERPL CALC-SCNC: 10 MMOL/L — SIGNIFICANT CHANGE UP (ref 5–17)
BUN SERPL-MCNC: 31 MG/DL — HIGH (ref 7–23)
CALCIUM SERPL-MCNC: 9.7 MG/DL — SIGNIFICANT CHANGE UP (ref 8.4–10.5)
CHLORIDE SERPL-SCNC: 100 MMOL/L — SIGNIFICANT CHANGE UP (ref 96–108)
CO2 SERPL-SCNC: 29 MMOL/L — SIGNIFICANT CHANGE UP (ref 22–31)
CREAT SERPL-MCNC: 2.07 MG/DL — HIGH (ref 0.5–1.3)
EGFR: 33 ML/MIN/1.73M2 — LOW
GLUCOSE SERPL-MCNC: 104 MG/DL — HIGH (ref 70–99)
HCT VFR BLD CALC: 25.4 % — LOW (ref 39–50)
HGB BLD-MCNC: 8.1 G/DL — LOW (ref 13–17)
MCHC RBC-ENTMCNC: 29.1 PG — SIGNIFICANT CHANGE UP (ref 27–34)
MCHC RBC-ENTMCNC: 31.9 GM/DL — LOW (ref 32–36)
MCV RBC AUTO: 91.4 FL — SIGNIFICANT CHANGE UP (ref 80–100)
NRBC # BLD: 0 /100 WBCS — SIGNIFICANT CHANGE UP (ref 0–0)
PLATELET # BLD AUTO: 341 K/UL — SIGNIFICANT CHANGE UP (ref 150–400)
POTASSIUM SERPL-MCNC: 3.6 MMOL/L — SIGNIFICANT CHANGE UP (ref 3.5–5.3)
POTASSIUM SERPL-SCNC: 3.6 MMOL/L — SIGNIFICANT CHANGE UP (ref 3.5–5.3)
RBC # BLD: 2.78 M/UL — LOW (ref 4.2–5.8)
RBC # FLD: 14.5 % — SIGNIFICANT CHANGE UP (ref 10.3–14.5)
SODIUM SERPL-SCNC: 139 MMOL/L — SIGNIFICANT CHANGE UP (ref 135–145)
WBC # BLD: 14.39 K/UL — HIGH (ref 3.8–10.5)
WBC # FLD AUTO: 14.39 K/UL — HIGH (ref 3.8–10.5)

## 2022-06-04 PROCEDURE — 71045 X-RAY EXAM CHEST 1 VIEW: CPT

## 2022-06-04 PROCEDURE — 82947 ASSAY GLUCOSE BLOOD QUANT: CPT

## 2022-06-04 PROCEDURE — 82803 BLOOD GASES ANY COMBINATION: CPT

## 2022-06-04 PROCEDURE — 72141 MRI NECK SPINE W/O DYE: CPT | Mod: MA

## 2022-06-04 PROCEDURE — 85027 COMPLETE CBC AUTOMATED: CPT

## 2022-06-04 PROCEDURE — 85014 HEMATOCRIT: CPT

## 2022-06-04 PROCEDURE — 96374 THER/PROPH/DIAG INJ IV PUSH: CPT

## 2022-06-04 PROCEDURE — 85018 HEMOGLOBIN: CPT

## 2022-06-04 PROCEDURE — 87637 SARSCOV2&INF A&B&RSV AMP PRB: CPT

## 2022-06-04 PROCEDURE — 84484 ASSAY OF TROPONIN QUANT: CPT

## 2022-06-04 PROCEDURE — 99285 EMERGENCY DEPT VISIT HI MDM: CPT | Mod: 25

## 2022-06-04 PROCEDURE — 84295 ASSAY OF SERUM SODIUM: CPT

## 2022-06-04 PROCEDURE — 80053 COMPREHEN METABOLIC PANEL: CPT

## 2022-06-04 PROCEDURE — 82435 ASSAY OF BLOOD CHLORIDE: CPT

## 2022-06-04 PROCEDURE — 81001 URINALYSIS AUTO W/SCOPE: CPT

## 2022-06-04 PROCEDURE — 93306 TTE W/DOPPLER COMPLETE: CPT

## 2022-06-04 PROCEDURE — 36415 COLL VENOUS BLD VENIPUNCTURE: CPT

## 2022-06-04 PROCEDURE — 84132 ASSAY OF SERUM POTASSIUM: CPT

## 2022-06-04 PROCEDURE — 85025 COMPLETE CBC W/AUTO DIFF WBC: CPT

## 2022-06-04 PROCEDURE — 80048 BASIC METABOLIC PNL TOTAL CA: CPT

## 2022-06-04 PROCEDURE — 83605 ASSAY OF LACTIC ACID: CPT

## 2022-06-04 PROCEDURE — 82330 ASSAY OF CALCIUM: CPT

## 2022-06-04 PROCEDURE — 97161 PT EVAL LOW COMPLEX 20 MIN: CPT

## 2022-06-04 PROCEDURE — 76770 US EXAM ABDO BACK WALL COMP: CPT

## 2022-06-04 PROCEDURE — 72146 MRI CHEST SPINE W/O DYE: CPT | Mod: MA

## 2022-06-04 PROCEDURE — 82962 GLUCOSE BLOOD TEST: CPT

## 2022-06-04 PROCEDURE — 86803 HEPATITIS C AB TEST: CPT

## 2022-06-04 PROCEDURE — 87086 URINE CULTURE/COLONY COUNT: CPT

## 2022-06-04 PROCEDURE — 96375 TX/PRO/DX INJ NEW DRUG ADDON: CPT

## 2022-06-04 PROCEDURE — 72148 MRI LUMBAR SPINE W/O DYE: CPT | Mod: MA

## 2022-06-04 RX ORDER — FINASTERIDE 5 MG/1
1 TABLET, FILM COATED ORAL
Qty: 30 | Refills: 0
Start: 2022-06-04 | End: 2022-07-03

## 2022-06-04 RX ORDER — TAMSULOSIN HYDROCHLORIDE 0.4 MG/1
1 CAPSULE ORAL
Qty: 30 | Refills: 0
Start: 2022-06-04 | End: 2022-07-03

## 2022-06-04 RX ORDER — TAMSULOSIN HYDROCHLORIDE 0.4 MG/1
1 CAPSULE ORAL
Qty: 0 | Refills: 0 | DISCHARGE
Start: 2022-06-04

## 2022-06-04 RX ORDER — OXYCODONE HYDROCHLORIDE 5 MG/1
1 TABLET ORAL
Qty: 9 | Refills: 0
Start: 2022-06-04 | End: 2022-06-06

## 2022-06-04 RX ORDER — SPIRONOLACTONE 25 MG/1
1 TABLET, FILM COATED ORAL
Qty: 0 | Refills: 0 | DISCHARGE

## 2022-06-04 RX ORDER — TAMSULOSIN HYDROCHLORIDE 0.4 MG/1
1 CAPSULE ORAL
Qty: 30 | Refills: 0 | DISCHARGE
Start: 2022-06-04 | End: 2022-07-03

## 2022-06-04 RX ORDER — BUMETANIDE 0.25 MG/ML
1 INJECTION INTRAMUSCULAR; INTRAVENOUS
Qty: 0 | Refills: 0 | DISCHARGE

## 2022-06-04 RX ORDER — LABETALOL HCL 100 MG
1 TABLET ORAL
Qty: 60 | Refills: 0
Start: 2022-06-04 | End: 2022-07-03

## 2022-06-04 RX ORDER — FINASTERIDE 5 MG/1
1 TABLET, FILM COATED ORAL
Qty: 0 | Refills: 0 | DISCHARGE
Start: 2022-06-04

## 2022-06-04 RX ADMIN — OXYCODONE HYDROCHLORIDE 5 MILLIGRAM(S): 5 TABLET ORAL at 13:07

## 2022-06-04 RX ADMIN — PANTOPRAZOLE SODIUM 40 MILLIGRAM(S): 20 TABLET, DELAYED RELEASE ORAL at 05:23

## 2022-06-04 RX ADMIN — Medication 100 MILLIGRAM(S): at 05:23

## 2022-06-04 RX ADMIN — OXYCODONE HYDROCHLORIDE 5 MILLIGRAM(S): 5 TABLET ORAL at 13:48

## 2022-06-04 RX ADMIN — FINASTERIDE 5 MILLIGRAM(S): 5 TABLET, FILM COATED ORAL at 12:23

## 2022-06-04 RX ADMIN — HEPARIN SODIUM 5000 UNIT(S): 5000 INJECTION INTRAVENOUS; SUBCUTANEOUS at 05:23

## 2022-06-04 RX ADMIN — OXYCODONE HYDROCHLORIDE 5 MILLIGRAM(S): 5 TABLET ORAL at 05:55

## 2022-06-04 RX ADMIN — OXYCODONE HYDROCHLORIDE 5 MILLIGRAM(S): 5 TABLET ORAL at 05:24

## 2022-06-04 NOTE — DISCHARGE NOTE PROVIDER - NSDCCPCAREPLAN_GEN_ALL_CORE_FT
PRINCIPAL DISCHARGE DIAGNOSIS  Diagnosis: Urinary obstruction  Assessment and Plan of Treatment: You came with bilateral hydronephrosis, AGUS, urinary retention secondary to BPH  Evaluated by urology, nephrology   s/p Fraire catheter placement   Please continue finasteride 5mg daily, also start flomax 0.4mg daily  Keep Fraire in place, Fraire management with home care   Please follow up with urology Dr Sidney Waldrop for further management of BPH  Please follow up with your primary care physician in one week         SECONDARY DISCHARGE DIAGNOSES  Diagnosis: Metabolic acidosis  Assessment and Plan of Treatment: Now resolved    Diagnosis: AGUS (acute kidney injury)  Assessment and Plan of Treatment: You were seen by nephrologist   Renal function improving after Fraire placement   Please follow up with nephrologist in one to two weeks    Diagnosis: Hyperkalemia  Assessment and Plan of Treatment: Now resolved    Diagnosis: Back pain  Assessment and Plan of Treatment: s/p C, T, L- spine MRI  IMPRESSION:  MR cervical spine: Degenerative changes and multilevel central canal and neural foraminal narrowing as described. Notably there is moderate   central canal stenosis at C5-C6. There is no cervical cord signal   abnormality.  MR thoracic spine: Degenerative changes as described. No high-grade   central canal stenosis. No thoracic cord signal abnormality.  MR lumbar spine: Degenerative changes and multilevel chronic compression deformities as described. At L1-L2 there is moderate central canal stenosis. At L4-L5 there is severe central canal stenosis.  Diffusely hypointense marrow signal throughout the cervical, thoracic and lumbar spine which is nonspecific and may be related to chronic anemia. Other diffuse marrow processes are not entirely excluded. Recommend correlation with clinical aspects of the case.  Bilateral moderate hydroureteronephrosis.  Please follow up with spine surgeon and pain management as outpatint   Fall precaution       Diagnosis: Chronic systolic HF (heart failure)  Assessment and Plan of Treatment: s/p echo- ef 46% Mild segmental left ventricular systolic dysfunction. The distal septum, distal inferior, and the apex are akinetic.The mid septum is hypokinetic.  No left ventricular thrombus. Trace-small pericardial effusion adjacent to the apex. Moderate diastolic dysfunction (Stage II).   Diuretic , ACE or ARB on hold given AGUS  Ischemic work up including Stress test can be done as outpatient as per cardiologist   Please follow up with your cardiologist

## 2022-06-04 NOTE — DISCHARGE NOTE PROVIDER - CARE PROVIDER_API CALL
Maciel Guido  CARDIOVASCULAR DISEASE  [address] [place] [id number]  Phone: [phone]  Fax: [phone]  Follow Up Time: 1 week    Sidney Waldrop)  Urology  [address] [place] [address], [address] [address]  Phone: [phone]  Fax: [phone]  Follow Up Time: 1 week   Maciel Guido  CARDIOVASCULAR DISEASE  [address] [place] [id number]  Phone: [phone]  Fax: [phone]  Follow Up Time: 1 week    Sidney Waldrop)  Urology  [address] [place] [address], [address] [address]  Phone: [phone]  Fax: [phone]  Follow Up Time: 1 week    Adeel Hu)  Clinical Informatics; Critical Care Medicine; Internal Medicine; Nephrology  [address] [place] [address]  Phone: [phone]  Fax: [phone]  Follow Up Time: 1 week

## 2022-06-04 NOTE — DISCHARGE NOTE PROVIDER - HOSPITAL COURSE
A/P     # Obstructive uropathy / Ac renal failure :  -in ED chapman placed : over 2 ltr urine drain   check UA/ urine c/s : neg   seen by nephrology   improving renal fx     # Metabolic acidosis   resolved   d/c sod bicarb tab     Anemia :   -check anemia panel     BPH / urinary out let obstruction   s/p chapman placed   seen by urology   started on flomax and proscar   plan for d/c with chapman and f/u with urology as out pt    # Leukocytosis :  -likely reactive : coming down , still high   UA is clean , CXR : no infiltrate   will monitor off abx     ac on ch back pain :  today MRI done , f/u result : reviewed   shows moderate central canal stenosis at Lumbar spine and moderate at C-spine   f/u with spine surgery as out pt    echo ef 46% Mild segmental left ventricular systolic dysfunction. The distal septum, distal inferior, and the apex are akinetic.The mid septum is hypokinetic.  No left ventricular thrombus. Trace-small pericardial effusion adjacent to the apex. Moderate diastolic dysfunction (Stage II).   -? new ICMP no previous echo to compare  -diuretics / acei or arb on hold given AGUS   -c/w BB   -stress test can be performed as an outpt      dispo: renal fx significantly improved , c/w chapman cath on d/c , f/u with urology as out pt.   plan for d/c    A/P Patient is a 71 year old Male with significant PMH of HTN, chronic low back pain who came to the [facility] on 5/31 with c/o abdominal distension , LE swelling and decreased urination 2-3 days. Pt. c/o increased frequency of urination initially but for the last 2-3 days he noticed decreased urination. Also noticed abdominal distension. Of note, he was started on aldactone 25 mg PO OD and bumex 1 mg PO OD by his PCP 2-3 months ago for LE swelling. Initial labs in ER, showed elevated Scr of 12.9 with hyperkalemia. Nephrology consulted for AGUS and hyper kalemia.   On review of [facility], it was noted that Scr was 1.29 on 5/4/22. Scr was elevated to 2 on 5/18. Scr was significantly elevated to 12.9 today. Also noted serum PSA was elevated to 7.6 in 2020. Chapman cath was placed and 2L of urine was drained. Pt. denies Hx of kidney disease or kidney stones in the past. Denies use of NSAIDs/ motrin or OTC meds recently. Pt. denies fever, chills, SOB, CP, Ha, N/V, diarrhea or constipation or dizziness.         # Obstructive uropathy / Ac renal failure :  -in ED chapman placed : over 2 ltr urine drain   check UA/ urine c/s : neg   seen by nephrology   improving renal fx     # Metabolic acidosis   resolved   d/c sod bicarb tab     Anemia :   -check anemia panel     BPH / urinary out let obstruction   s/p chapman placed   seen by urology   started on flomax and proscar   plan for d/c with chapman and f/u with urology as out pt    # Leukocytosis :  -likely reactive : coming down , still high   UA is clean , CXR : no infiltrate   will monitor off abx     ac on ch back pain :  today MRI done , f/u result : reviewed   shows moderate central canal stenosis at Lumbar spine and moderate at C-spine   f/u with spine surgery as out pt    echo ef 46% Mild segmental left ventricular systolic dysfunction. The distal septum, distal inferior, and the apex are akinetic.The mid septum is hypokinetic.  No left ventricular thrombus. Trace-small pericardial effusion adjacent to the apex. Moderate diastolic dysfunction (Stage II).   -? new ICMP no previous echo to compare  -diuretics / acei or arb on hold given AGUS   -c/w BB   -stress test can be performed as an outpt      dispo: renal fx significantly improved , c/w chapman cath on d/c , f/u with urology as out pt.   plan for d/c   Medically cleared for discharge

## 2022-06-04 NOTE — DISCHARGE NOTE PROVIDER - CARE PROVIDERS DIRECT ADDRESSES
,DirectAddress_Unknown,DirectAddress_Unknown ,DirectAddress_Unknown,DirectAddress_Unknown,[email or web address]

## 2022-06-04 NOTE — PHYSICAL THERAPY INITIAL EVALUATION ADULT - ADDITIONAL COMMENTS
Pt lives with wife in a home with 4 steps to enter. PTA pt was (I) with ADLs and functional mobility with a cane. Reported worsening gait due to back pain.

## 2022-06-04 NOTE — PROGRESS NOTE ADULT - ASSESSMENT
Echo 6/2/22:  EF 46%; mild mr,  Mild segmental left ventricular systolic dysfunction. The distal septum, distal inferior, and the apex are akinetic.The mid septum is hypokinetic.  No left ventricular thrombus. Trace-small pericardial effusion adjacent to the apex. Moderate diastolic dysfunction (Stage II).  *** No previous Echo exam.       a/p  71 year old Male with significant PMH of HTN, chronic low back pain presenting with c/o  urinary retention, AGUS, hyperkalemia, SOB    #sob  -pt associated it with back pain   -leukocytosis improving -- chest xray clear  -echo ef 46% Mild segmental left ventricular systolic dysfunction. The distal septum, distal inferior, and the apex are akinetic.The mid septum is hypokinetic.  No left ventricular thrombus. Trace-small pericardial effusion adjacent to the apex. Moderate diastolic dysfunction (Stage II).  -ecg with no ischemic changes   -hold diuretics given agus , no chf on exam     #Systolic CHF   -echo ef 46% Mild segmental left ventricular systolic dysfunction. The distal septum, distal inferior, and the apex are akinetic.The mid septum is hypokinetic.  No left ventricular thrombus. Trace-small pericardial effusion adjacent to the apex. Moderate diastolic dysfunction (Stage II).   -? new ICMP no previous echo to compare  -diuretics / acei or arb on hold given AGUS   -c/w BB   -stress test can be performed as an outpt    #urinary retention/ agus   -chapman in place   - kidney and bladder noted  -management per renal/ med   -lasix on hold   -MRI spine noted   -hyperkalemia- resolved management per renal     #spinal stenosis   -MRI noted- med fu -neurosurgery eval pending     35 minutes spent on total encounter; more than 50% of the visit was spent counseling and/or coordinating care by the attending physician.

## 2022-06-04 NOTE — PHYSICAL THERAPY INITIAL EVALUATION ADULT - PERTINENT HX OF CURRENT PROBLEM, REHAB EVAL
Pt is a 72 y/o male with PMH of HTN, chronic low back pain who came to the [facility] on 5/31 with c/o abdominal distension , LE swelling and decreased urination 2-3 days. Pt. c/o increased frequency of urination initially but for the last 2-3 days he noticed decreased urination. Also noticed abdominal distension. Of note, he was started on aldactone 25 mg PO OD and bumex

## 2022-06-04 NOTE — PROGRESS NOTE ADULT - SUBJECTIVE AND OBJECTIVE BOX
CARDIOLOGY FOLLOW UP - Dr. Hernandez  Date of Service: 6/4/22  CC: no cp/sob, + back pain, wants to go home    Review of Systems:  Constitutional: No fever, weight loss, or fatigue  Respiratory: No cough, wheezing, or hemoptysis, no shortness of breath  Cardiovascular: No chest pain, palpitations, passing out, dizziness, or leg swelling  Gastrointestinal: No abd or epigastric pain. No nausea, vomiting, or hematemesis; no diarrhea or consiptaiton, no melena or hematochezia  Vascular: No edema     TELEMETRY:    PHYSICAL EXAM:  T(C): 36.8 (06-04-22 @ 04:10), Max: 36.9 (06-03-22 @ 12:25)  HR: 79 (06-04-22 @ 04:10) (79 - 91)  BP: 142/67 (06-04-22 @ 04:10) (119/58 - 142/67)  RR: 18 (06-04-22 @ 04:10) (18 - 18)  SpO2: 92% (06-04-22 @ 04:10) (90% - 92%)  Wt(kg): --  I&O's Summary    03 Jun 2022 07:01  -  04 Jun 2022 07:00  --------------------------------------------------------  IN: 2235 mL / OUT: 4200 mL / NET: -1965 mL        Appearance: Normal	  Cardiovascular: Normal S1 S2,RRR, No JVD, No murmurs  Respiratory: Lungs clear to auscultation	  Gastrointestinal:  Soft, Non-tender, + BS	  Extremities: Normal range of motion, No clubbing, cyanosis or edema  Vascular: Peripheral pulses palpable 2+ bilaterally       Home Medications:  bumetanide 1 mg oral tablet: 1 tab(s) orally once a day (01 Jun 2022 09:06)  spironolactone 25 mg oral tablet: 1 tab(s) orally once a day (01 Jun 2022 09:06)        MEDICATIONS  (STANDING):  finasteride 5 milliGRAM(s) Oral daily  heparin   Injectable 5000 Unit(s) SubCutaneous every 12 hours  labetalol 100 milliGRAM(s) Oral two times a day  pantoprazole    Tablet 40 milliGRAM(s) Oral before breakfast  sodium chloride 0.45% 1000 milliLiter(s) (100 mL/Hr) IV Continuous <Continuous>  tamsulosin 0.4 milliGRAM(s) Oral at bedtime        EKG:  RADIOLOGY:  DIAGNOSTIC TESTING:  [ ] Echocardiogram:  [ ] Catherterization:  [ ] Stress Test:  OTHER:     LABS:	 	                          8.1    14.39 )-----------( 341      ( 04 Jun 2022 06:10 )             25.4     06-04    139  |  100  |  31<H>  ----------------------------<  104<H>  3.6   |  29  |  2.07<H>    Ca    9.7      04 Jun 2022 06:10            CARDIAC MARKERS:

## 2022-06-04 NOTE — DISCHARGE NOTE PROVIDER - NSDCMRMEDTOKEN_GEN_ALL_CORE_FT
bumetanide 1 mg oral tablet: 1 tab(s) orally once a day  spironolactone 25 mg oral tablet: 1 tab(s) orally once a day   finasteride 5 mg oral tablet: 1 tab(s) orally once a day  labetalol 100 mg oral tablet: 1 tab(s) orally 2 times a day  oxyCODONE 5 mg oral tablet: 1 tab(s) orally 3 times a day, As needed, Moderate-Severe Pain (4 - 10) MDD:3 tabs  tamsulosin 0.4 mg oral capsule: 1 cap(s) orally once a day (at bedtime)

## 2022-06-04 NOTE — DISCHARGE NOTE NURSING/CASE MANAGEMENT/SOCIAL WORK - PATIENT PORTAL LINK FT
You can access the FollowMyHealth Patient Portal offered by [facility] by registering at the following website: [email or web address]. By joining [facility]’s FollowMyHealth portal, you will also be able to view your health information using other applications (apps) compatible with our system.

## 2022-06-04 NOTE — DISCHARGE NOTE PROVIDER - PROVIDER TOKENS
PROVIDER:[TOKEN:[[id number]],FOLLOWUP:[1 week]],PROVIDER:[TOKEN:[[id number]],FOLLOWUP:[1 week]] PROVIDER:[TOKEN:[[id number]],FOLLOWUP:[1 week]],PROVIDER:[TOKEN:[[id number]],FOLLOWUP:[1 week]],PROVIDER:[TOKEN:[[id number]],FOLLOWUP:[1 week]]

## 2022-06-04 NOTE — PHYSICAL THERAPY INITIAL EVALUATION ADULT - PRECAUTIONS/LIMITATIONS, REHAB EVAL
1 mg PO OD by his PCP 2-3 months ago for LE swelling. Initial labs in ER, showed elevated Scr of 12.9 with hyperkalemia. Nephrology consulted for AGUS and hyper kalemia. Admitted for obstructive uropathy, metabolic acidosis, leukocytosis./fall precautions

## 2022-06-04 NOTE — PHYSICAL THERAPY INITIAL EVALUATION ADULT - PLANNED THERAPY INTERVENTIONS, PT EVAL
GOAL: Pt will be able to negotiate 4 steps independently in 2 weeks./balance training/bed mobility training/gait training/strengthening/transfer training

## 2022-06-04 NOTE — PROGRESS NOTE ADULT - REASON FOR ADMISSION
abd pain with obstructive uropathy

## 2022-06-04 NOTE — DISCHARGE NOTE PROVIDER - NSDCFUADDAPPT_GEN_ALL_CORE_FT
APPTS ARE READY TO BE MADE: [X ] YES    Best Family or Patient Contact (if needed):    Additional Information about above appointments (if needed):    1:   2:   3:     Other comments or requests:    APPTS ARE READY TO BE MADE: [X ] YES    Best Family or Patient Contact (if needed):    Additional Information about above appointments (if needed):    1:   2:   3:     Other comments or requests:   Patient was provided with Maciel Olivares's information and was advised to call to schedule follow up within specified time frame.   Patient was provided with Sidney Richmond's information and was advised to call to schedule follow up within specified time frame.  Patient was provided with Adeel Hurley's information and was advised to call to schedule follow up within specified time frame.

## 2022-06-04 NOTE — DISCHARGE NOTE NURSING/CASE MANAGEMENT/SOCIAL WORK - NSDCPEFALRISK_GEN_ALL_CORE
For information on Fall & Injury Prevention, visit: [email or web address] OR  [email or web address] OR  https://www.cdc.gov/steadi/patient.html

## 2022-06-06 PROBLEM — I10 ESSENTIAL (PRIMARY) HYPERTENSION: Chronic | Status: ACTIVE | Noted: 2022-05-31

## 2022-06-06 NOTE — CHART NOTE - NSCHARTNOTEFT_GEN_A_CORE
Left a message for patient in regards to follow up care with callback information.
Contacted Dr. Waldrop's office on the patient's behalf as requested. Will outreach again to confirm follow up appointment details.

## 2022-06-08 ENCOUNTER — APPOINTMENT (OUTPATIENT)
Dept: ORTHOPEDIC SURGERY | Facility: CLINIC | Age: 72
End: 2022-06-08
Payer: MEDICARE

## 2022-06-08 ENCOUNTER — NON-APPOINTMENT (OUTPATIENT)
Age: 72
End: 2022-06-08

## 2022-06-08 VITALS
SYSTOLIC BLOOD PRESSURE: 120 MMHG | BODY MASS INDEX: 25.07 KG/M2 | HEIGHT: 66 IN | HEART RATE: 67 BPM | WEIGHT: 156 LBS | DIASTOLIC BLOOD PRESSURE: 69 MMHG

## 2022-06-08 DIAGNOSIS — Z82.49 FAMILY HISTORY OF ISCHEMIC HEART DISEASE AND OTHER DISEASES OF THE CIRCULATORY SYSTEM: ICD-10-CM

## 2022-06-08 DIAGNOSIS — Z87.891 PERSONAL HISTORY OF NICOTINE DEPENDENCE: ICD-10-CM

## 2022-06-08 DIAGNOSIS — R29.2 ABNORMAL REFLEX: ICD-10-CM

## 2022-06-08 PROCEDURE — 99204 OFFICE O/P NEW MOD 45 MIN: CPT

## 2022-06-08 PROCEDURE — 72110 X-RAY EXAM L-2 SPINE 4/>VWS: CPT

## 2022-06-08 PROCEDURE — 72170 X-RAY EXAM OF PELVIS: CPT | Mod: 59

## 2022-06-08 RX ORDER — FINASTERIDE 5 MG/1
5 TABLET, FILM COATED ORAL
Refills: 0 | Status: ACTIVE | COMMUNITY

## 2022-06-08 NOTE — CONSULT LETTER
[Dear  ___] : Dear  [unfilled], [Consult Letter:] : I had the pleasure of evaluating your patient, [unfilled]. [FreeTextEntry2] : Maciel Guido [FreeTextEntry1] : Thank you for this referral. I have enclosed my note for your review. Please feel free to contact my office if you have additional questions regarding this patient.\par \par Regards,\par Clarence Davila MD, FACS, FAAOS\par \par  of Orthopaedic Surgery\par [facility] [employer] [facility]\par Spinal Reconstruction Surgery\par Minimally Invasive Spinal Surgery\par [facility]

## 2022-06-08 NOTE — HISTORY OF PRESENT ILLNESS
[Worsening] : worsening [10] : a current pain level of 10/10 [Daily] : ~He/She~ states the symptoms seem to be occuring daily [None] : No relieving factors are noted [___ mths] : [unfilled] month(s) ago [de-identified] : Patient is here today referral Katie Acuña for evaluation on his right low back buttock down right leg into right foot going on for the past 8 months no known injury. Patient saw Neurologist  pain management had several procedures not really sure what they were he thinks one was lumbar epidural injection.\par Worsening over past 4 months\par was hospitalized for urinary retention 4 days, [facility]\par Priamrily LBP, R> L, also has leg pain buttock, posterior thigh to knee, little calf or heel pain [de-identified] : everything at this time

## 2022-06-08 NOTE — PHYSICAL EXAM
[Walker] : ambulates with walker [Stooped] : stooped [] : Motor: [NL] : Motor strength of the right lower extremity was normal [___/5] : left ([unfilled]/5) [LE] : Sensory: Intact in bilateral lower extremities [1+] : right ankle jerk 1+ [3+] : left ankle jerk 3+ [DTR Reflexes Clonus Of Left Ankle (___ Beats)] : present on the left [DP] : dorsalis pedis 2+ and symmetric bilaterally [SLR] : negative straight leg raise [Plantar Reflex Right Only] : absent on the right [Plantar Reflex Left Only] : absent on the left [DTR Reflexes Clonus Of Right Ankle (___ Beats)] : absent on the right [de-identified] : seen with his wife\par indwelling urinary catheter\par The pt is awake, alert and oriented to self, place and time, is comfortable and in no acute distress. Inspection of neck, back and lower extremities bilaterally reveals no rashes.  Some ecchymosis over arms.  There is no tenderness over the cervical, thoracic or lumbar spine, or the paraspinal or upper and lower extremities musculature. There is no sacroiliac tenderness. No greater trochanteric tenderness bilaterally. No atrophy or abnormal movements noted in the upper or lower extremities. There is no swelling noted in the upper or lower extremities bilaterally. No cervical lymphadenopathy noted anteriorly. No joint laxity noted in the upper and lower extremity joints bilaterally.\par Hip range of motion is degrees internal rotation 30° external rotation without pain on the right with some discomfort with hip rotation on the left.. Full range of motion of the shoulders bilaterally with no significant pain\par There is mild left groin pain with hip internal rotation and a negative DAMARIS test bilaterally.  [de-identified] : 4 views lumbar spine obtained today demonstrate partially visualized indwelling catheter.  Degenerative changes lower thoracic spine.  Normal lumbar lordosis with diffuse osteopenia.  Superior plate compression deformity seen of L1 with approximately 10% loss of vertebral body height.  Superior plate compression deformity of L2 with approximately 25% vertebral body height loss anteriorly and centrally.  Central depression seen at the L4 vertebral body.  Grade 1 spinal listhesis at L4-5 and to lesser extent L5-S1.  Severe endplate compression also seen and suspected L5.  These are all age-indeterminate.  Anterolisthesis measures approximately 12 mm at L4-5 and 7 mm at L5-S1.  Extension is 8 mm L4-5 and 4 mm L5-S1.\par \par AP pelvis demonstrates partially visualized indwelling catheter.  No obvious acute fractures.  Significant left hip degeneration noted with loss of joint space osteophytes along the inferior femur.  Squaring of the superior aspect of the femoral head also noted.\par \par ________\par \par ACC: [id number] EXAM: MR SPINE LUMBAR\par ACC: [id number] EXAM: MR SPINE THORACIC\par ACC: [id number] EXAM: MR SPINE CERVICAL\par \par PROCEDURE DATE: 06/01/2022\par \par INTERPRETATION: MR CERVICAL SPINE WITHOUT CONTRAST\par MR THORACIC SPINE WITHOUT CONTRAST\par MR LUMBAR SPINE WITHOUT CONTRAST\par \par CLINICAL HISTORY: Swelling lower back pain. Urinary retention.\par \par TECHNIQUE: Multiphasic sagittal non-contrast magnetic resonance imaging of the cervical, thoracic, and lumbar spine was performed. Axial sequences were also obtained\par \par COMPARISON: Ultrasound abdomen 5/31/2022\par \par FINDINGS:\par \par The marrow is diffusely hypointense throughout the cervical, thoracic and lumbar spine which is nonspecific and may be related to chronic anemia. A diffuse marrow process not entirely excluded.\par \par CERVICAL SPINE:\par \par There is straightening of the usual cervical lordosis. The vertebral body heights and alignment are maintained. No focal STIR hyperintense marrow replacing lesion. There is severe loss of intervertebral disc height at C5-C6 and C6-C7. There are associated degenerative endplate changes.\par \par Multilevel diffuse disc osteophyte complexes contribute to central canal narrowing throughout the cervical spine to varying degrees. Notably, At C5-C6 there is a diffuse disc osteophyte complex and ligamentum flavum thickening which mildly indents the ventral cord and contributes to moderate central canal stenosis. There is no underlying cord signal abnormality throughout cervical spine.\par \par Multilevel facet hypertrophy and uncovertebral spurring contribute to neural foraminal narrowing throughout the cervical spine to varying degrees. At C3-C4 there is moderate right and severe left neural foraminal stenosis. At C5-C6 there is severe right and moderate left neural foraminal stenosis. At C6-C7 there is moderate bilateral neural foraminal stenosis.\par \par THORACIC SPINE:\par \par Normal thoracic kyphosis is maintained. Vertebral body height is maintained throughout the thoracic spine. No focal STIR hyperintense marrow replacing lesion. Thoracic Alignment is maintained. A few mild multilevel disc bulges are present. There is no significant thoracic spinal canal stenosis. No significant neural foraminal stenosis. There is no significant thoracic spinal cord signal abnormality.\par \par Lumbar Spine: Normal lumbar lordosis is maintained. Vertebral body alignment is maintained. No focal STIR hyperintense marrow replacing lesion. At L1 there is mild loss of vertebral body height and anterior wedging without underlying marrow edema consistent with a mild chronic compression deformity. There is a superimposed Schmorl's node along the superior endplate at T12. At L2 there is moderate loss of vertebral body height and anterior wedging, without underlying marrow edema consistent with chronic moderate compression deformity. At L4 there is mild loss of vertebral body height and depression of the superior endplate without underlying marrow edema consistent with a chronic mild compression deformity. At L5 there is mild to moderate loss of vertebral body height without underlying marrow edema consistent with a chronic compression deformity. There is moderate disc space narrowing at L5-S1.\par \par The conus medullaris terminates at L2.\par \par At L1-L2 there is a broad based central protrusion and ligamentum flavum thickening which contribute to moderate central canal stenosis. Moderate left and mild right neural foraminal narrowing.\par \par At L2-L3 there is a mild disc bulge which contributes to mild central canal narrowing. No significant subarticular zone or neural foraminal stenosis.\par \par At L3-L4 there is a disc bulge and ligamentum flavum thickening which contribute to mild central canal stenosis and moderate bilateral subarticular zone stenosis.\par \par At L4-L5 there is prominent disc bulge, ligamentum flavum thickening and facet hypertrophy which contribute to severe central canal stenosis, and moderate severe right neural foraminal stenosis.\par \par L5-S1 there is a disc bulge which indents the ventral thecal sac and contributes to mild central canal stenosis. There is moderate to severe right neural foraminal stenosis.\par \par There is a 1.2 cm nodule in the left lobe of thyroid gland. Bilateral moderate hydroureteronephrosis.\par \par IMPRESSION:\par MR cervical spine: Degenerative changes and multilevel central canal and neural foraminal narrowing as described. Notably there is moderate central canal stenosis at C5-C6. There is no cervical cord signal abnormality.\par \par MR thoracic spine: Degenerative changes as described. No high-grade central canal stenosis. No thoracic cord signal abnormality.\par \par MR lumbar spine: Degenerative changes and multilevel chronic compression deformities as described. At L1-L2 there is moderate central canal stenosis. At L4-L5 there is severe central canal stenosis.\par \par Diffusely hypointense marrow signal throughout the cervical, thoracic and lumbar spine which is nonspecific and may be related to chronic anemia. Other diffuse marrow processes are not entirely excluded. Recommend correlation with clinical aspects of the case.\par Bilateral moderate hydroureteronephrosis.\par \par --- End of Report ---\par \par MAGGIE WREN MD; Attending Radiologist\par This document has been electronically signed. Jun 2 2022 10:23AM

## 2022-06-08 NOTE — DISCUSSION/SUMMARY
[Medication Risks Reviewed] : Medication risks reviewed [de-identified] : The patient has previously had an MRI of the cervical thoracic and lumbar spine while hospitalized at [facility] and has been described the findings while in the hospital.  I independently reviewed the MRI today.  Discussed the degenerative change at C5-6 and mild to moderate stenosis at that level without cord signal change.  He also has degeneration with severe spinal stenosis at L4-5 and L5-S1.  Disc protrusion with stenosis moderate at L1-2 also noted.\par \par Given his low back pain radiating down his right leg into the right foot we discussed the option of lumbar epidural steroid injections.  More significantly he may need a laminectomy and fusion from L4-S1 to better stabilize his spine given the spinal listhesis noted on x-rays today and spinal stenosis noted on the MRI.  He currently has an indwelling catheter and will leak to be addressed first prior to any invasive treatments including injections.  Prior to any surgery neurologic evaluation has been recommended given the hyperreflexia and clonus in the left leg as well as urinary retention and no significant MRI findings that would explain his symptoms.  Home physical therapy prescription was provided for him today as well.  Prescribed him gabapentin and tizanidine.  He may continue use of Tylenol.\par \par For now we will schedule bilateral L4 transforaminal epidural steroid injection when his catheter is removed and he is cleared by the urologist.\par \par For long-term patient also will need evaluation by hip surgeon to further assess his manage his left hip pathology.

## 2022-06-10 ENCOUNTER — APPOINTMENT (OUTPATIENT)
Dept: UROLOGY | Facility: CLINIC | Age: 72
End: 2022-06-10
Payer: MEDICARE

## 2022-06-10 DIAGNOSIS — Z78.9 OTHER SPECIFIED HEALTH STATUS: ICD-10-CM

## 2022-06-10 PROCEDURE — 99204 OFFICE O/P NEW MOD 45 MIN: CPT

## 2022-06-11 ENCOUNTER — NON-APPOINTMENT (OUTPATIENT)
Age: 72
End: 2022-06-11

## 2022-06-11 NOTE — PHYSICAL EXAM
[Normal Appearance] : normal appearance [Well Groomed] : well groomed [Edema] : no peripheral edema [Abdomen Tenderness] : non-tender [Costovertebral Angle Tenderness] : no ~M costovertebral angle tenderness [Urethral Meatus] : meatus normal [Penis Abnormality] : normal circumcised penis [Epididymis] : the epididymides were normal [Testes Tenderness] : no tenderness of the testes [Testes Mass (___cm)] : there were no testicular masses [Prostate Tenderness] : the prostate was not tender [No Prostate Nodules] : no prostate nodules [Prostate Size ___ gm] : prostate size [unfilled] gm [FreeTextEntry1] : 16F chapman draining yellow urine [Normal Station and Gait] : the gait and station were normal for the patient's age [No Focal Deficits] : no focal deficits [] : no rash [Oriented To Time, Place, And Person] : oriented to person, place, and time [No Palpable Adenopathy] : no palpable adenopathy

## 2022-06-11 NOTE — LETTER BODY
[Dear  ___] : Dear  [unfilled], [Consult Letter:] : I had the pleasure of evaluating your patient, [unfilled]. [Please see my note below.] : Please see my note below. [Consult Closing:] : Thank you very much for allowing me to participate in the care of this patient.  If you have any questions, please do not hesitate to contact me. [Sincerely,] : Sincerely, [FreeTextEntry2] : Maciel Guido MD\par [address] [place] [id number] [FreeTextEntry3] : Sidney Waldrop MD\par [facility] [place]\par [address] [place] [address], [address]\par [place], [place]\par [address]\par p: [phone]\par f: [phone]

## 2022-06-11 NOTE — REVIEW OF SYSTEMS
[Wake up at night to urinate  How many times?  ___] : wakes up to urinate [unfilled] times during the night [Negative] : Heme/Lymph [FreeTextEntry2] : frequent urination

## 2022-06-11 NOTE — HISTORY OF PRESENT ILLNESS
[FreeTextEntry1] : This is a 71y old Male with PMHx of HTN who presents as a hospital follow-up.  \par \par To review, he was admitted to [facility] on May 31 with AGUS, urinary retention, and was found to have bilateral hydronephrosis. The patient came in with abdominal pain and decreased urine output - a chapman was placed with 2L of output. Creatinine 12.91 on admission. Downtrended to 2.07 (b/l ~1.2)\par \par The Chapman catheter remain in place.  He reports at baseline, his urinary symptoms included daytime voiding every 2 hours, nocturia x4.  He never had sensation of incomplete emptying.  No history of urinary retention in the past, no UTI.  He believes he saw a urologist several years ago for leakage.  He was prescribed tamsulosin prior to the hospitalization but was taking this intermittently.  No family history of  malignancy.  Unknown if he has ever had PSA screening. He has spinal stenosis and HTN. He is now on flomax and finasteride. Prostate 64 mL by abdominal US\par \par PSA 2.92 5/2022\par \par Renal US 6/1/2022\par IMPRESSION:\par Underlying renal parenchymal disease. Bilateral renal cysts. Bilateral perinephric fluid.\par Bilateral moderate hydroureteronephrosis. Fine echoes within the left renal collecting system. Echogenic material within the urinary bladder may represent blood products. Correlate with urinalysis.\par Prostatomegaly.\par \par

## 2022-06-11 NOTE — ASSESSMENT
[FreeTextEntry1] : Mr Roberto is a 71 y.o. M with HTN, spinal stenosis who presents as a hospital follow-up with AGUS, bilateral hydronephrosis due to urinary retention.  He had an AGUS, with a creatinine up to 12, was down trended to approximately 2 with champan catheter drainage.  Renal ultrasound with bilateral hydro in the setting of retention.  Prostate 64 mL.\par - Continue flomax / finasteride\par - Chapman removed. Patient instructed on how to perform CIC (clean intermittent catheterization). Asked patient to perform q6 hours. He will keep a log of his voided volumes and catheterized volumes\par - Has nephrology follow-up 7/12/22\par - Discussed he may ultimately require an outlet procedure. Discussed various options - TURP, rezum, laser enucleation. Would favor enucleation given significant degree of retention. Discussed possibility he will be able to spontaneously void on medical therapy, therefore patient will continue to attempt to void / CIC for now. Will see him back in ~3-4 weeks to re-assess.

## 2022-06-13 ENCOUNTER — APPOINTMENT (OUTPATIENT)
Dept: UROLOGY | Facility: CLINIC | Age: 72
End: 2022-06-13
Payer: MEDICARE

## 2022-06-13 ENCOUNTER — NON-APPOINTMENT (OUTPATIENT)
Age: 72
End: 2022-06-13

## 2022-06-13 DIAGNOSIS — N32.0 BLADDER-NECK OBSTRUCTION: ICD-10-CM

## 2022-06-13 PROCEDURE — 99213 OFFICE O/P EST LOW 20 MIN: CPT

## 2022-06-13 NOTE — PHYSICAL EXAM
[Abdomen Soft] : soft [Abdomen Tenderness] : non-tender [Costovertebral Angle Tenderness] : no ~M costovertebral angle tenderness [] : no respiratory distress [Respiration, Rhythm And Depth] : normal respiratory rhythm and effort [Exaggerated Use Of Accessory Muscles For Inspiration] : no accessory muscle use [Oriented To Time, Place, And Person] : oriented to person, place, and time [Affect] : the affect was normal [Mood] : the mood was normal [Not Anxious] : not anxious [FreeTextEntry1] : Using a walker

## 2022-06-13 NOTE — ASSESSMENT
[FreeTextEntry1] : He was instructed again on how to perform self-catheterization and he did very well in the office and over 1 L drained.  He will continue to do self catheterizations at home as instructed.  Basic metabolic panel can also be checked to assess for renal function.  He will follow-up with Dr. Waldrop.

## 2022-06-13 NOTE — HISTORY OF PRESENT ILLNESS
[FreeTextEntry1] : He is a 71-year-old man who is seen today in follow-up.  He was hospitalized for urinary retention and worsening renal function.  He was found to have bilateral hydronephrosis as well.  Initially his creatinine level was 12.9.  After Fraire catheter was placed creatinine decreased around 2 according to family.  He was instructed on how to perform self catheterizations but he is not sure if he is doing it well.  Residual urine today was over 600 mL.  He is on tamsulosin.

## 2022-06-14 ENCOUNTER — NON-APPOINTMENT (OUTPATIENT)
Age: 72
End: 2022-06-14

## 2022-06-14 LAB
ANION GAP SERPL CALC-SCNC: 12 MMOL/L
BUN SERPL-MCNC: 51 MG/DL
CALCIUM SERPL-MCNC: 9.8 MG/DL
CHLORIDE SERPL-SCNC: 104 MMOL/L
CO2 SERPL-SCNC: 22 MMOL/L
CREAT SERPL-MCNC: 3.2 MG/DL
EGFR: 20 ML/MIN/1.73M2
GLUCOSE SERPL-MCNC: 84 MG/DL
POTASSIUM SERPL-SCNC: 5.4 MMOL/L
SODIUM SERPL-SCNC: 137 MMOL/L

## 2022-06-15 ENCOUNTER — NON-APPOINTMENT (OUTPATIENT)
Age: 72
End: 2022-06-15

## 2022-06-15 ENCOUNTER — OUTPATIENT (OUTPATIENT)
Dept: OUTPATIENT SERVICES | Facility: HOSPITAL | Age: 72
LOS: 1 days | End: 2022-06-15
Payer: MEDICARE

## 2022-06-15 DIAGNOSIS — Z20.828 CONTACT WITH AND (SUSPECTED) EXPOSURE TO OTHER VIRAL COMMUNICABLE DISEASES: ICD-10-CM

## 2022-06-15 LAB — SARS-COV-2 RNA SPEC QL NAA+PROBE: SIGNIFICANT CHANGE UP

## 2022-06-15 PROCEDURE — U0003: CPT

## 2022-06-15 PROCEDURE — U0005: CPT

## 2022-06-17 ENCOUNTER — OUTPATIENT (OUTPATIENT)
Dept: OUTPATIENT SERVICES | Facility: HOSPITAL | Age: 72
LOS: 1 days | End: 2022-06-17
Payer: MEDICARE

## 2022-06-17 ENCOUNTER — APPOINTMENT (OUTPATIENT)
Dept: ORTHOPEDIC SURGERY | Facility: HOSPITAL | Age: 72
End: 2022-06-17

## 2022-06-17 DIAGNOSIS — M43.10 SPONDYLOLISTHESIS, SITE UNSPECIFIED: ICD-10-CM

## 2022-06-17 DIAGNOSIS — M48.062 SPINAL STENOSIS, LUMBAR REGION WITH NEUROGENIC CLAUDICATION: ICD-10-CM

## 2022-06-17 PROCEDURE — 64483 NJX AA&/STRD TFRM EPI L/S 1: CPT | Mod: 50

## 2022-06-23 ENCOUNTER — NON-APPOINTMENT (OUTPATIENT)
Age: 72
End: 2022-06-23

## 2022-06-28 ENCOUNTER — APPOINTMENT (OUTPATIENT)
Dept: UROLOGY | Facility: CLINIC | Age: 72
End: 2022-06-28

## 2022-06-28 VITALS
DIASTOLIC BLOOD PRESSURE: 73 MMHG | WEIGHT: 156 LBS | SYSTOLIC BLOOD PRESSURE: 120 MMHG | HEIGHT: 66 IN | TEMPERATURE: 98 F | OXYGEN SATURATION: 97 % | HEART RATE: 76 BPM | BODY MASS INDEX: 25.07 KG/M2 | RESPIRATION RATE: 18 BRPM

## 2022-06-28 PROCEDURE — 99213 OFFICE O/P EST LOW 20 MIN: CPT

## 2022-06-28 NOTE — ASSESSMENT
[FreeTextEntry1] : Mr Roberto is a 71 y.o. M who presents with urinary retention, with resultant bilateral hydronephrosis and AGUS.  He is now performing CIC.  He is on tamsulosin and Flomax, however he is still not spontaneously voiding.\par \par At this point, we considered options going forward.  He is safely managing his bladder with CIC, and his volumes are between 500-700 cc.  I will check a creatinine today.  Regarding long-term management, he desires to be catheter free.  We discussed that the tamsulosin has been on for a month, however the finasteride may take up to 3 to 6 months to work. One option is to continue with medical therapy and to perform CIC in the meantime. Alternatively, we discussed proceeding with surgery.  All outlet procedures including office based, TURP, bipolar TURP, button vaporization, Rezum, Aquablation, Urolift, thulium/holmium, suprapubic/retropubic simple (open, robotic) prostatectomy.   \par \par All potential side effects of treatment were reviewed including, but not limited to: short term or permanent stress urinary incontinence and/or short term or permanent erectile dysfunction, rectal symptoms/pain, perineal pain, bleeding, rectal injury, recognized vs. unrecognized/delayed-recognition injury, TUR syndrome, risks of DVT, PE, MI, death, risks of cardiopulmonary/anesthesia related complications, positional injury, infection, ureteral injury/obstruction, bladder neck contracture, meatal stenosis, urethral stricture and other complications. \par \par My personal and institutional experience reviewed, as well as literature regarding these options. \par \par - BMP today\par - Renewed prescription for Flomax twice daily, finasteride\par - Renewed catheter prescription\par - Schedule for TURP

## 2022-06-28 NOTE — HISTORY OF PRESENT ILLNESS
[FreeTextEntry1] : Mr Roberto is a 71 y.o. M who presents for f/u\par \par To review, he was admitted to [facility] on May 31 with AGUS, urinary retention, and was found to have bilateral hydronephrosis. The patient came in with abdominal pain and decreased urine output - a chapman was placed with 2L of output. Creatinine 12.91 on admission. Downtrended to 2.07 (b/l ~1.2)\par \par He was initiated on flomax / finasteride and CIC. \par \par PSA 2.92 5/2022\par \par He comes in with his voiding log.  He is performing CIC every 6 hours.  He is cathing for volumes between 5 to 750 cc.  He is rarely voiding, but when he does he voids 100-150 mL at a time.  He is taking tamsulosin twice daily, and finasteride.  Denies flank pain, fever, chills, nausea, emesis.\par \par Renal US 6/1/2022\par IMPRESSION:\par Underlying renal parenchymal disease. Bilateral renal cysts. Bilateral perinephric fluid.\par Bilateral moderate hydroureteronephrosis. Fine echoes within the left renal collecting system. Echogenic material within the urinary bladder may represent blood products. Correlate with urinalysis.\par Prostatomegaly.\par

## 2022-06-28 NOTE — PHYSICAL EXAM
[Normal Appearance] : normal appearance [Well Groomed] : well groomed [Abdomen Tenderness] : non-tender [Costovertebral Angle Tenderness] : no ~M costovertebral angle tenderness [Urethral Meatus] : meatus normal [Epididymis] : the epididymides were normal [Testes Tenderness] : no tenderness of the testes [Testes Mass (___cm)] : there were no testicular masses [Prostate Tenderness] : the prostate was not tender [No Prostate Nodules] : no prostate nodules [Prostate Size ___ gm] : prostate size [unfilled] gm [Skin Color & Pigmentation] : normal skin color and pigmentation [Edema] : no peripheral edema [Exaggerated Use Of Accessory Muscles For Inspiration] : no accessory muscle use [Affect] : the affect was normal [Normal Station and Gait] : the gait and station were normal for the patient's age [No Focal Deficits] : no focal deficits [Sensation] : the sensory exam was normal to light touch and pinprick [No Palpable Adenopathy] : no palpable adenopathy

## 2022-06-28 NOTE — LETTER BODY
[Dear  ___] : Dear  [unfilled], [Consult Letter:] : I had the pleasure of evaluating your patient, [unfilled]. [Please see my note below.] : Please see my note below. [Consult Closing:] : Thank you very much for allowing me to participate in the care of this patient.  If you have any questions, please do not hesitate to contact me. [Sincerely,] : Sincerely, [FreeTextEntry2] : Maciel Guido MD\par [address] [place] [address] [FreeTextEntry3] : Sidney Waldrop MD\par [facility] [place]\par [address] [place] [address], [address]\par [place], [place] [address]\par p: [phone]\par f: [phone]

## 2022-06-29 ENCOUNTER — APPOINTMENT (OUTPATIENT)
Dept: ORTHOPEDIC SURGERY | Facility: CLINIC | Age: 72
End: 2022-06-29

## 2022-06-29 VITALS — HEART RATE: 77 BPM | DIASTOLIC BLOOD PRESSURE: 71 MMHG | SYSTOLIC BLOOD PRESSURE: 120 MMHG

## 2022-06-29 LAB
ANION GAP SERPL CALC-SCNC: 11 MMOL/L
BUN SERPL-MCNC: 36 MG/DL
CALCIUM SERPL-MCNC: 10 MG/DL
CHLORIDE SERPL-SCNC: 105 MMOL/L
CO2 SERPL-SCNC: 19 MMOL/L
CREAT SERPL-MCNC: 1.3 MG/DL
EGFR: 58 ML/MIN/1.73M2
GLUCOSE SERPL-MCNC: 96 MG/DL
POTASSIUM SERPL-SCNC: 4.9 MMOL/L
SODIUM SERPL-SCNC: 135 MMOL/L

## 2022-06-29 PROCEDURE — 99213 OFFICE O/P EST LOW 20 MIN: CPT

## 2022-06-29 RX ORDER — TRAMADOL HYDROCHLORIDE 50 MG/1
50 TABLET, COATED ORAL
Qty: 60 | Refills: 0 | Status: ACTIVE | COMMUNITY
Start: 2022-03-04

## 2022-06-29 NOTE — PHYSICAL EXAM
[Stooped] : stooped [Cane] : ambulates with cane [] : Motor: [NL] : Motor strength of the right lower extremity was normal [___/5] : left ([unfilled]/5) [LE] : Sensory: Intact in bilateral lower extremities [1+] : right ankle jerk 1+ [3+] : left ankle jerk 3+ [DTR Reflexes Clonus Of Left Ankle (___ Beats)] : present on the left [DP] : dorsalis pedis 2+ and symmetric bilaterally [SLR] : negative straight leg raise [Plantar Reflex Right Only] : absent on the right [Plantar Reflex Left Only] : absent on the left [DTR Reflexes Clonus Of Right Ankle (___ Beats)] : absent on the right [de-identified] : seen with his wife\par \par The pt is awake, alert and oriented to self, place and time, is comfortable and in no acute distress. Inspection of neck, back and lower extremities bilaterally reveals no rashes.  Some ecchymosis over arms.  There is no tenderness over the cervical, thoracic or lumbar spine, or the paraspinal or upper and lower extremities musculature. There is no sacroiliac tenderness. No greater trochanteric tenderness bilaterally. No atrophy or abnormal movements noted in the upper or lower extremities. There is no swelling noted in the upper or lower extremities bilaterally. No cervical lymphadenopathy noted anteriorly. No joint laxity noted in the upper and lower extremity joints bilaterally.\par Hip range of motion is degrees internal rotation 30° external rotation without pain on the right with some discomfort with hip rotation on the left.. Full range of motion of the shoulders bilaterally with no significant pain\par There is mild left groin pain with hip internal rotation and a negative DAMARIS test bilaterally.

## 2022-06-29 NOTE — HISTORY OF PRESENT ILLNESS
[Improving] : improving [6] : a current pain level of 6/10 [None] : No relieving factors are noted [de-identified] : Patient presents here today foe revaluation of low back pain after AMEE done 6/17/22, bilateral L4. Patient reports an improvement of 25% from AMEE. Patient also reports jerking moments on right lower  extremities at times. Patient uses cane for support.\par Had PT at home, now using cane\par Awaiting procedure for his prostate, possibly in 3-4 weeks [de-identified] : sit to stand repositioning

## 2022-06-29 NOTE — DISCUSSION/SUMMARY
[Medication Risks Reviewed] : Medication risks reviewed [de-identified] : He would like to proceed rather epidural steroid injection we will schedule bilateral L4 transforaminal epidural steroid injection at his earliest convenience.  He will continue following up with the urologist.  He continues to self cath at this time and is on medications for his urinary retention.  He is considering prostate surgery by his urologist but that is still undecided.  He understands more definitive treatment for his lumbar spine with laminectomy and fusion likely from L4-S1 may be considered once his urologic issues are stabilized.  A prescription of meloxicam was provided for him today.

## 2022-07-12 ENCOUNTER — APPOINTMENT (OUTPATIENT)
Dept: NEPHROLOGY | Facility: CLINIC | Age: 72
End: 2022-07-12

## 2022-07-12 VITALS
SYSTOLIC BLOOD PRESSURE: 107 MMHG | BODY MASS INDEX: 22.18 KG/M2 | WEIGHT: 138 LBS | HEIGHT: 66 IN | HEART RATE: 77 BPM | TEMPERATURE: 97.8 F | DIASTOLIC BLOOD PRESSURE: 67 MMHG | OXYGEN SATURATION: 95 %

## 2022-07-12 PROCEDURE — 99215 OFFICE O/P EST HI 40 MIN: CPT

## 2022-07-12 RX ORDER — OFLOXACIN 3 MG/ML
0.3 SOLUTION/ DROPS OPHTHALMIC
Qty: 10 | Refills: 0 | Status: DISCONTINUED | COMMUNITY
Start: 2022-06-15 | End: 2022-07-12

## 2022-07-12 RX ORDER — BUMETANIDE 1 MG/1
1 TABLET ORAL
Qty: 30 | Refills: 0 | Status: DISCONTINUED | COMMUNITY
Start: 2022-05-18 | End: 2022-07-12

## 2022-07-12 RX ORDER — MELOXICAM 15 MG/1
15 TABLET ORAL
Qty: 30 | Refills: 0 | Status: DISCONTINUED | COMMUNITY
Start: 2022-05-04 | End: 2022-07-12

## 2022-07-12 RX ORDER — SPIRONOLACTONE 25 MG/1
25 TABLET ORAL
Qty: 30 | Refills: 0 | Status: DISCONTINUED | COMMUNITY
Start: 2022-05-18 | End: 2022-07-12

## 2022-07-12 RX ORDER — OXYCODONE 5 MG/1
5 TABLET ORAL
Refills: 0 | Status: DISCONTINUED | COMMUNITY
End: 2022-07-12

## 2022-07-12 RX ORDER — GABAPENTIN 100 MG/1
100 CAPSULE ORAL
Qty: 30 | Refills: 2 | Status: DISCONTINUED | COMMUNITY
Start: 2022-06-08 | End: 2022-07-12

## 2022-07-12 RX ORDER — LABETALOL HYDROCHLORIDE 100 MG/1
100 TABLET, FILM COATED ORAL
Refills: 0 | Status: DISCONTINUED | COMMUNITY
End: 2022-07-12

## 2022-07-12 NOTE — ASSESSMENT
[FreeTextEntry1] : acute kidney injury - due to bladder outlet obstruction leading to bilateral hydronephrosis. creat on 5/5/22 was 1.29> peaked to 12.9 in the hospital and now at 1.2 after relief of obstruction. last urinalysis showed blood in urine  but not protein- unlikely glomerular- related to his urinary obstruction. \par \par the patient does not need nephrology follow up at this time. I have asked him to call me if any questions.

## 2022-07-12 NOTE — PHYSICAL EXAM
[General Appearance - Alert] : alert [General Appearance - In No Acute Distress] : in no acute distress [General Appearance - Well Nourished] : well nourished [General Appearance - Well Developed] : well developed [General Appearance - Well-Appearing] : healthy appearing [Sclera] : the sclera and conjunctiva were normal [PERRL With Normal Accommodation] : pupils were equal in size, round, and reactive to light [Extraocular Movements] : extraocular movements were intact [Outer Ear] : the ears and nose were normal in appearance [Hearing Threshold Finger Rub Not Lamar] : hearing was normal [Examination Of The Oral Cavity] : the lips and gums were normal [Neck Appearance] : the appearance of the neck was normal [Neck Cervical Mass (___cm)] : no neck mass was observed [Jugular Venous Distention Increased] : there was no jugular-venous distention [Thyroid Diffuse Enlargement] : the thyroid was not enlarged [Respiration, Rhythm And Depth] : normal respiratory rhythm and effort [Exaggerated Use Of Accessory Muscles For Inspiration] : no accessory muscle use [Auscultation Breath Sounds / Voice Sounds] : lungs were clear to auscultation bilaterally [Chest Palpation] : palpation of the chest revealed no abnormalities [Heart Rate And Rhythm] : heart rate was normal and rhythm regular [Heart Sounds] : normal S1 and S2 [Heart Sounds Gallop] : no gallops [Murmurs] : no murmurs [Heart Sounds Pericardial Friction Rub] : no pericardial rub [Arterial Pulses Carotid] : carotid pulses were normal with no bruits [Edema] : there was no peripheral edema [Veins - Varicosity Changes] : there were no varicosital changes [Bowel Sounds] : normal bowel sounds [Abdomen Soft] : soft [Abdomen Mass (___ Cm)] : no abdominal mass palpated [Abdomen Tenderness] : non-tender [No CVA Tenderness] : no ~M costovertebral angle tenderness [No Spinal Tenderness] : no spinal tenderness [Abnormal Walk] : normal gait [Nail Clubbing] : no clubbing  or cyanosis of the fingernails [Involuntary Movements] : no involuntary movements were seen [Musculoskeletal - Swelling] : no joint swelling seen [Motor Tone] : muscle strength and tone were normal [Skin Color & Pigmentation] : normal skin color and pigmentation [Skin Turgor] : normal skin turgor [] : no rash [Deep Tendon Reflexes (DTR)] : deep tendon reflexes were 2+ and symmetric [Cranial Nerves] : cranial nerves 2-12 were intact [Sensation] : the sensory exam was normal to light touch and pinprick [Motor Exam] : the motor exam was normal [No Focal Deficits] : no focal deficits [Impaired Insight] : insight and judgment were intact [Oriented To Time, Place, And Person] : oriented to person, place, and time [Affect] : the affect was normal [Mood] : the mood was normal

## 2022-07-12 NOTE — HISTORY OF PRESENT ILLNESS
[FreeTextEntry1] : hospital follow up for acute kidney injury \par \par 70 yo male with no past medical history, admitted to [facility] on May 31 with AGUS, urinary retention, and was found to have bilateral hydronephrosis. The patient came in with abdominal pain and decreased urine output - a chapman was placed with 2L of output. Creatinine 12.91 on admission. Downtrended to 2.07 (b/l ~1.2)\par \par He was initiated on flomax / finasteride and intermittent catheterization. he is following Urology and is contemplating TURP. \par \par Last urinalysis showed hematuria but no proteinuria\par \par feels okay and denies any complaints \par \par ROS \par gu - no changes in urination, no blood in urine \par cvs- no chest pain, no shortness of breath \par all other systems reviewed in detail and were negative except as above \par \par pmh- none \par \par social h- denies smoking/ drug use \par \par family h- none of kidney disease\par \par

## 2022-07-13 ENCOUNTER — OUTPATIENT (OUTPATIENT)
Dept: OUTPATIENT SERVICES | Facility: HOSPITAL | Age: 72
LOS: 1 days | End: 2022-07-13
Payer: MEDICARE

## 2022-07-13 DIAGNOSIS — Z20.828 CONTACT WITH AND (SUSPECTED) EXPOSURE TO OTHER VIRAL COMMUNICABLE DISEASES: ICD-10-CM

## 2022-07-13 LAB — SARS-COV-2 RNA SPEC QL NAA+PROBE: SIGNIFICANT CHANGE UP

## 2022-07-13 PROCEDURE — U0003: CPT

## 2022-07-13 PROCEDURE — U0005: CPT

## 2022-07-15 ENCOUNTER — APPOINTMENT (OUTPATIENT)
Dept: ORTHOPEDIC SURGERY | Facility: HOSPITAL | Age: 72
End: 2022-07-15

## 2022-07-15 ENCOUNTER — OUTPATIENT (OUTPATIENT)
Dept: OUTPATIENT SERVICES | Facility: HOSPITAL | Age: 72
LOS: 1 days | End: 2022-07-15
Payer: MEDICARE

## 2022-07-15 DIAGNOSIS — M54.16 RADICULOPATHY, LUMBAR REGION: ICD-10-CM

## 2022-07-15 DIAGNOSIS — M48.062 SPINAL STENOSIS, LUMBAR REGION WITH NEUROGENIC CLAUDICATION: ICD-10-CM

## 2022-07-15 DIAGNOSIS — M51.37 OTHER INTERVERTEBRAL DISC DEGENERATION, LUMBOSACRAL REGION: ICD-10-CM

## 2022-07-15 DIAGNOSIS — M43.10 SPONDYLOLISTHESIS, SITE UNSPECIFIED: ICD-10-CM

## 2022-07-15 PROCEDURE — 64483 NJX AA&/STRD TFRM EPI L/S 1: CPT | Mod: 50

## 2022-08-05 ENCOUNTER — APPOINTMENT (OUTPATIENT)
Dept: UROLOGY | Facility: HOSPITAL | Age: 72
End: 2022-08-05

## 2022-08-08 NOTE — PHYSICAL EXAM
[Normal Appearance] : normal appearance [Well Groomed] : well groomed [Abdomen Tenderness] : non-tender [Costovertebral Angle Tenderness] : no ~M costovertebral angle tenderness [Urethral Meatus] : meatus normal [Epididymis] : the epididymides were normal [Testes Tenderness] : no tenderness of the testes [Testes Mass (___cm)] : there were no testicular masses [Skin Color & Pigmentation] : normal skin color and pigmentation [Edema] : no peripheral edema [Exaggerated Use Of Accessory Muscles For Inspiration] : no accessory muscle use [Affect] : the affect was normal [Normal Station and Gait] : the gait and station were normal for the patient's age [No Focal Deficits] : no focal deficits [Sensation] : the sensory exam was normal to light touch and pinprick [No Palpable Adenopathy] : no palpable adenopathy

## 2022-08-09 ENCOUNTER — APPOINTMENT (OUTPATIENT)
Dept: UROLOGY | Facility: CLINIC | Age: 72
End: 2022-08-09

## 2022-08-09 VITALS
RESPIRATION RATE: 18 BRPM | HEIGHT: 66 IN | HEART RATE: 70 BPM | SYSTOLIC BLOOD PRESSURE: 117 MMHG | DIASTOLIC BLOOD PRESSURE: 70 MMHG | TEMPERATURE: 98.6 F | WEIGHT: 138 LBS | OXYGEN SATURATION: 95 % | BODY MASS INDEX: 22.18 KG/M2

## 2022-08-09 DIAGNOSIS — N17.9 ACUTE KIDNEY FAILURE, UNSPECIFIED: ICD-10-CM

## 2022-08-09 PROCEDURE — 99213 OFFICE O/P EST LOW 20 MIN: CPT

## 2022-08-09 NOTE — ASSESSMENT
[FreeTextEntry1] : Mr Felisa is a 71 y.o. M who presents with urinary retention, with resultant bilateral hydronephrosis and AGUS.  He is now performing CIC.  He is on tamsulosin and Flomax. He is cathing BID and voiding between caths. Last Cr back to baseline. \par \par - BMP today\par - Continue flomax BID, finasteride\par - Renal US to re-assess hydronephrosis\par - Continue BID catheterization\par - Previously discussed outlet procedures. He would prefer to avoid surgery if possible. Discussed if Cr / renal US demonstrate good bladder emptying / normalization of Cr, can continue on medical therapy and BID cathing\par - RV 3 months

## 2022-08-09 NOTE — LETTER BODY
[Dear  ___] : Dear  [unfilled], [Consult Letter:] : I had the pleasure of evaluating your patient, [unfilled]. [Please see my note below.] : Please see my note below. [Consult Closing:] : Thank you very much for allowing me to participate in the care of this patient.  If you have any questions, please do not hesitate to contact me. [Sincerely,] : Sincerely, [FreeTextEntry2] : Maciel Guido MD\par [address] [place] [id number] [FreeTextEntry3] : Sidney Waldrop MD\par [facility] [place]\par [address] [place] [address], [address]\par [place], [place]\par [address]\par p: [phone]\par f: [phone]

## 2022-08-10 LAB
ANION GAP SERPL CALC-SCNC: 10 MMOL/L
BUN SERPL-MCNC: 34 MG/DL
CALCIUM SERPL-MCNC: 10.1 MG/DL
CHLORIDE SERPL-SCNC: 108 MMOL/L
CO2 SERPL-SCNC: 21 MMOL/L
CREAT SERPL-MCNC: 1.19 MG/DL
EGFR: 64 ML/MIN/1.73M2
GLUCOSE SERPL-MCNC: 78 MG/DL
POTASSIUM SERPL-SCNC: 4.9 MMOL/L
SODIUM SERPL-SCNC: 138 MMOL/L

## 2022-08-15 ENCOUNTER — OUTPATIENT (OUTPATIENT)
Dept: OUTPATIENT SERVICES | Facility: HOSPITAL | Age: 72
LOS: 1 days | End: 2022-08-15
Payer: MEDICARE

## 2022-08-15 ENCOUNTER — APPOINTMENT (OUTPATIENT)
Dept: ULTRASOUND IMAGING | Facility: CLINIC | Age: 72
End: 2022-08-15

## 2022-08-15 DIAGNOSIS — N13.30 UNSPECIFIED HYDRONEPHROSIS: ICD-10-CM

## 2022-08-15 PROCEDURE — 76775 US EXAM ABDO BACK WALL LIM: CPT

## 2022-08-15 PROCEDURE — 76775 US EXAM ABDO BACK WALL LIM: CPT | Mod: 26

## 2022-08-29 ENCOUNTER — APPOINTMENT (OUTPATIENT)
Dept: ORTHOPEDIC SURGERY | Facility: CLINIC | Age: 72
End: 2022-08-29

## 2022-08-29 ENCOUNTER — NON-APPOINTMENT (OUTPATIENT)
Age: 72
End: 2022-08-29

## 2022-08-29 VITALS
HEIGHT: 66 IN | SYSTOLIC BLOOD PRESSURE: 136 MMHG | WEIGHT: 135 LBS | DIASTOLIC BLOOD PRESSURE: 76 MMHG | BODY MASS INDEX: 21.69 KG/M2 | HEART RATE: 76 BPM

## 2022-08-29 PROCEDURE — 99214 OFFICE O/P EST MOD 30 MIN: CPT

## 2022-08-29 RX ORDER — TIZANIDINE 2 MG/1
2 TABLET ORAL EVERY 8 HOURS
Qty: 30 | Refills: 2 | Status: DISCONTINUED | COMMUNITY
Start: 2022-06-08 | End: 2022-08-29

## 2022-08-29 NOTE — DISCUSSION/SUMMARY
[Medication Risks Reviewed] : Medication risks reviewed [de-identified] : Self cathing once a day and taking medications for it.I spoke with the patient and his wife about his current presentation.  His urinary condition has improved though he still continues to self cath once a day.  He is seeing a nephrologist and a urologist and has a follow-up scheduled with a urologist in December.  He complains of primarily axial low back pain with prolonged sitting and walking.  He reports some right buttock and lateral thigh pain.  He has significant disc degeneration at L4-5 and L5-S1 with spondylolisthesis at L4-5 with severe spinal stenosis at the L4-5 level.  He also has multiple chronic compression fractures in the lumbar spine.  In this setting the most optimal surgery for him would be a laminectomy and fusion from L4-S1.  However given his prior fracture in the lumbar spine I have advocated evaluation by an endocrinologist and treatment to improve his bone quality prior to considering any spinal instrumentation and the family is in agreement.  A referral to an endocrinologist was provided.  A referral to physical therapy was also provided.  I will see him back in 3 to 6 months to reassess his treatment options.  His most recent epidural steroid injection at L4 did not help him as much as his first 1 in I suspect that given his axial back pain further injections may not be very helpful for him.\par \par Previously performed MRI of the cervical thoracic and lumbar spine was again reviewed with him.  He does have disc pathology at C5-6 with moderate stenosis though he does not appear to have cord compression or myelomalacia changes and does not report any symptoms related to upper motor neuron disease.\par \par Trial of baclofen was prescribed for him since tizanidine was not very helpful.  Recommended use of Tylenol arthritis as well.

## 2022-08-29 NOTE — PHYSICAL EXAM
[Stooped] : stooped [Cane] : ambulates with cane [] : Motor: [NL] : Motor strength of the right lower extremity was normal [___/5] : left ([unfilled]/5) [LE] : Sensory: Intact in bilateral lower extremities [1+] : right ankle jerk 1+ [3+] : left ankle jerk 3+ [DTR Reflexes Clonus Of Left Ankle (___ Beats)] : present on the left [DP] : dorsalis pedis 2+ and symmetric bilaterally [SLR] : negative straight leg raise [Plantar Reflex Right Only] : absent on the right [Plantar Reflex Left Only] : absent on the left [DTR Reflexes Clonus Of Right Ankle (___ Beats)] : absent on the right [de-identified] : seen with his wife\par \par The pt is awake, alert and oriented to self, place and time, is comfortable and in no acute distress. Inspection of neck, back and lower extremities bilaterally reveals no rashes.  Some ecchymosis over arms.  There is no tenderness over the cervical, thoracic or lumbar spine, or the paraspinal or upper and lower extremities musculature. There is no sacroiliac tenderness. No greater trochanteric tenderness bilaterally. No atrophy or abnormal movements noted in the upper or lower extremities. There is no swelling noted in the upper or lower extremities bilaterally. No cervical lymphadenopathy noted anteriorly. No joint laxity noted in the upper and lower extremity joints bilaterally.\par Hip range of motion is degrees internal rotation 30° external rotation without pain on the right with some discomfort with hip rotation on the left.. Full range of motion of the shoulders bilaterally with no significant pain\par There is mild left groin pain with hip internal rotation and a negative DAMARIS test bilaterally.  [de-identified] : ACC: [id number] EXAM: MR SPINE LUMBAR\par ACC: [id number] EXAM: MR SPINE THORACIC\par ACC: [id number] EXAM: MR SPINE CERVICAL\par \par PROCEDURE DATE: 06/01/2022\par \par INTERPRETATION: MR CERVICAL SPINE WITHOUT CONTRAST\par MR THORACIC SPINE WITHOUT CONTRAST\par MR LUMBAR SPINE WITHOUT CONTRAST\par \par CLINICAL HISTORY: Swelling lower back pain. Urinary retention.\par \par TECHNIQUE: Multiphasic sagittal non-contrast magnetic resonance imaging of the cervical, thoracic, and lumbar spine was performed. Axial sequences were also obtained\par \par COMPARISON: Ultrasound abdomen 5/31/2022\par \par FINDINGS:\par \par The marrow is diffusely hypointense throughout the cervical, thoracic and lumbar spine which is nonspecific and may be related to chronic anemia. A diffuse marrow process not entirely excluded.\par \par CERVICAL SPINE:\par \par There is straightening of the usual cervical lordosis. The vertebral body heights and alignment are maintained. No focal STIR hyperintense marrow replacing lesion. There is severe loss of intervertebral disc height at C5-C6 and C6-C7. There are associated degenerative endplate changes.\par \par Multilevel diffuse disc osteophyte complexes contribute to central canal narrowing throughout the cervical spine to varying degrees. Notably, At C5-C6 there is a diffuse disc osteophyte complex and ligamentum flavum thickening which mildly indents the ventral cord and contributes to moderate central canal stenosis. There is no underlying cord signal abnormality throughout cervical spine.\par \par Multilevel facet hypertrophy and uncovertebral spurring contribute to neural foraminal narrowing throughout the cervical spine to varying degrees. At C3-C4 there is moderate right and severe left neural foraminal stenosis. At C5-C6 there is severe right and moderate left neural foraminal stenosis. At C6-C7 there is moderate bilateral neural foraminal stenosis.\par \par THORACIC SPINE:\par \par Normal thoracic kyphosis is maintained. Vertebral body height is maintained throughout the thoracic spine. No focal STIR hyperintense marrow replacing lesion. Thoracic Alignment is maintained. A few mild multilevel disc bulges are present. There is no significant thoracic spinal canal stenosis. No significant neural foraminal stenosis. There is no significant thoracic spinal cord signal abnormality.\par \par Lumbar Spine: Normal lumbar lordosis is maintained. Vertebral body alignment is maintained. No focal STIR hyperintense marrow replacing lesion. At L1 there is mild loss of vertebral body height and anterior wedging without underlying marrow edema consistent with a mild chronic compression deformity. There is a superimposed Schmorl's node along the superior endplate at T12. At L2 there is moderate loss of vertebral body height and anterior wedging, without underlying marrow edema consistent with chronic moderate compression deformity. At L4 there is mild loss of vertebral body height and depression of the superior endplate without underlying marrow edema consistent with a chronic mild compression deformity. At L5 there is mild to moderate loss of vertebral body height without underlying marrow edema consistent with a chronic compression deformity. There is moderate disc space narrowing at L5-S1.\par \par The conus medullaris terminates at L2.\par \par At L1-L2 there is a broad based central protrusion and ligamentum flavum thickening which contribute to moderate central canal stenosis. Moderate left and mild right neural foraminal narrowing.\par \par At L2-L3 there is a mild disc bulge which contributes to mild central canal narrowing. No significant subarticular zone or neural foraminal stenosis.\par \par At L3-L4 there is a disc bulge and ligamentum flavum thickening which contribute to mild central canal stenosis and moderate bilateral subarticular zone stenosis.\par \par At L4-L5 there is prominent disc bulge, ligamentum flavum thickening and facet hypertrophy which contribute to severe central canal stenosis, and moderate severe right neural foraminal stenosis.\par \par L5-S1 there is a disc bulge which indents the ventral thecal sac and contributes to mild central canal stenosis. There is moderate to severe right neural foraminal stenosis.\par \par There is a 1.2 cm nodule in the left lobe of thyroid gland. Bilateral moderate hydroureteronephrosis.\par \par IMPRESSION:\par MR cervical spine: Degenerative changes and multilevel central canal and neural foraminal narrowing as described. Notably there is moderate central canal stenosis at C5-C6. There is no cervical cord signal abnormality.\par \par MR thoracic spine: Degenerative changes as described. No high-grade central canal stenosis. No thoracic cord signal abnormality.\par \par MR lumbar spine: Degenerative changes and multilevel chronic compression deformities as described. At L1-L2 there is moderate central canal stenosis. At L4-L5 there is severe central canal stenosis.\par \par Diffusely hypointense marrow signal throughout the cervical, thoracic and lumbar spine which is nonspecific and may be related to chronic anemia. Other diffuse marrow processes are not entirely excluded. Recommend correlation with clinical aspects of the case.\par Bilateral moderate hydroureteronephrosis.\par \par --- End of Report ---\par \par MAGGIE WRNE MD; Attending Radiologist\par This document has been electronically signed. Jun 2 2022 10:23AM

## 2022-08-29 NOTE — REASON FOR VISIT
[Follow-Up Visit] : a follow-up visit for [Degenerative Joint Disease] : degenerative joint disease [Back Pain] : back pain [Radiculopathy] : radiculopathy [Spondylolistheses] : spondylolistheses [Spouse] : spouse

## 2022-08-29 NOTE — HISTORY OF PRESENT ILLNESS
[Worsening] : worsening [10] : a current pain level of 10/10 [Walking] : walking [Daily] : ~He/She~ states the symptoms seem to be occuring daily [Bending] : worsened by bending [Rest] : relieved by rest [de-identified] : Patient had his 2nd lumbar epidural injection on 7/15/22. Patient states no relief 2nd injection made his pain worse bilateral L4 AMEE.\par Pain across back, sitting primarily across bck\par walking in swimming pool, stationary bicycle [de-identified] : sitting to standing to walking  [de-identified] : cane tizanidine tylenol

## 2022-09-27 ENCOUNTER — NON-APPOINTMENT (OUTPATIENT)
Age: 72
End: 2022-09-27

## 2022-11-09 ENCOUNTER — APPOINTMENT (OUTPATIENT)
Dept: UROLOGY | Facility: CLINIC | Age: 72
End: 2022-11-09

## 2022-11-09 VITALS
SYSTOLIC BLOOD PRESSURE: 138 MMHG | BODY MASS INDEX: 23.32 KG/M2 | DIASTOLIC BLOOD PRESSURE: 70 MMHG | OXYGEN SATURATION: 95 % | TEMPERATURE: 97.8 F | WEIGHT: 140 LBS | HEIGHT: 65 IN | HEART RATE: 90 BPM | RESPIRATION RATE: 16 BRPM

## 2022-11-09 PROCEDURE — 51798 US URINE CAPACITY MEASURE: CPT

## 2022-11-09 PROCEDURE — 99213 OFFICE O/P EST LOW 20 MIN: CPT

## 2022-11-09 NOTE — HISTORY OF PRESENT ILLNESS
[FreeTextEntry1] : Mr Roberto is a 72 y.o. M who presents for f/u\par \par To review, he was admitted to [facility] on May 31 with AGUS, urinary retention, and was found to have bilateral hydronephrosis. The patient came in with abdominal pain and decreased urine output - a chapman was placed with 2L of output. Creatinine 12.91 on admission. Downtrended to 2.07 (b/l ~1.2)\par \par He was initiated on flomax / finasteride and CIC. \par \par PSA 2.92 5/2022\par \par Renal US 6/1/2022\par IMPRESSION:\par Underlying renal parenchymal disease. Bilateral renal cysts. Bilateral perinephric fluid.\par Bilateral moderate hydroureteronephrosis. Fine echoes within the left renal collecting system. Echogenic material within the urinary bladder may represent blood products. Correlate with urinalysis.\par Prostatomegaly.\par \par He was counseled for TURP and scheduled, but canceled this prior to surgery.\par Creatinine 8/9/2022 - 1.19\par \par Renal US 8/17/2022\par No hydronephrosis\par \par 11/9/2022\par He is cathing anywhere from 1-2 times / day. Usually for ~500cc when caths in evening. No problems catheterizing, blood. During the day, he voids every 1.5 - 2 hours, nocturia x 3-4. No flank pain. No fevers / chills / nausea / emesis. No UTIs. No gross hematuria, no issues cathing.\par

## 2022-11-09 NOTE — PHYSICAL EXAM
[Normal Appearance] : normal appearance [Abdomen Tenderness] : non-tender [Urethral Meatus] : meatus normal [Penis Abnormality] : normal circumcised penis [Epididymis] : the epididymides were normal [Testes Tenderness] : no tenderness of the testes [Testes Mass (___cm)] : there were no testicular masses [FreeTextEntry1] : ABBY - 50g prostate, smooth

## 2022-11-09 NOTE — ASSESSMENT
[FreeTextEntry1] : Mr Roberto is a 72 y.o. M who presents with urinary retention, with resultant bilateral hydronephrosis and AGUS.  He is now performing CIC.  He is on tamsulosin and Flomax. He is cathing 1-2 times / day and voiding between caths. Last Cr back to baseline (1.19), f/u renal US with resolution of hydrobnephrosis\par \par - Continue flomax BID, finasteride\par - Continue BID catheterization\par - Previously discussed outlet procedures. He would prefer to avoid surgery if possible. Discussed because Cr / renal US demonstrate good bladder emptying / normalization of Cr, it is safe to continue medical therapy and BID cathing\par - RV 2 months for cystoscopy to re-evaluate urethra, rule-out other causes of obstruction (aside from BPH)\par

## 2022-12-08 NOTE — ED ADULT NURSE NOTE - NSFALLRSKPASTHIST_ED_ALL_ED
Detail Level: Detailed
Depth Of Biopsy: dermis
Was A Bandage Applied: Yes
Size Of Lesion In Cm: 0
Biopsy Type: H and E
Biopsy Method: Personna blade
Anesthesia Type: 1% lidocaine with epinephrine
Anesthesia Volume In Cc: 0.5
Hemostasis: Aluminum Chloride
Wound Care: Aquaphor
Dressing: bandage
Destruction After The Procedure: No
Type Of Destruction Used: Curettage
Cryotherapy Text: The wound bed was treated with cryotherapy after the biopsy was performed.
Electrodesiccation Text: The wound bed was treated with electrodesiccation after the biopsy was performed.
Electrodesiccation And Curettage Text: The wound bed was treated with electrodesiccation and curettage after the biopsy was performed.
Silver Nitrate Text: The wound bed was treated with silver nitrate after the biopsy was performed.
Lab: [id number]
Billing Type: Third-Party Bill
Information: Selecting Yes will display possible errors in your note based on the variables you have selected. This validation is only offered as a suggestion for you. PLEASE NOTE THAT THE VALIDATION TEXT WILL BE REMOVED WHEN YOU FINALIZE YOUR NOTE. IF YOU WANT TO FAX A PRELIMINARY NOTE YOU WILL NEED TO TOGGLE THIS TO 'NO' IF YOU DO NOT WANT IT IN YOUR FAXED NOTE.
no

## 2023-01-03 ENCOUNTER — NON-APPOINTMENT (OUTPATIENT)
Age: 73
End: 2023-01-03

## 2023-01-09 LAB — BACTERIA UR CULT: ABNORMAL

## 2023-01-18 ENCOUNTER — APPOINTMENT (OUTPATIENT)
Dept: UROLOGY | Facility: CLINIC | Age: 73
End: 2023-01-18

## 2023-01-18 ENCOUNTER — APPOINTMENT (OUTPATIENT)
Dept: UROLOGY | Facility: CLINIC | Age: 73
End: 2023-01-18
Payer: MEDICARE

## 2023-01-18 PROCEDURE — 99213 OFFICE O/P EST LOW 20 MIN: CPT

## 2023-01-18 NOTE — HISTORY OF PRESENT ILLNESS
[FreeTextEntry1] : Mr Roberto is a 72 y.o. M who presents for f/u\par \par To review, he was admitted to [facility] on May 31, 2022 with AGUS, urinary retention, and was found to have bilateral hydronephrosis. The patient came in with abdominal pain and decreased urine output - a chapman was placed with 2L of output. Creatinine 12.91 on admission. Downtrended to 2.07 (b/l ~1.2)\par \par He was initiated on flomax / finasteride and CIC. \par \par PSA 2.92 5/2022\par \par Renal US 6/1/2022\par IMPRESSION:\par Underlying renal parenchymal disease. Bilateral renal cysts. Bilateral perinephric fluid.\par Bilateral moderate hydroureteronephrosis. Fine echoes within the left renal collecting system. Echogenic material within the urinary bladder may represent blood products. Correlate with urinalysis.\par Prostatomegaly.\par \par He was counseled for TURP and scheduled, but canceled this prior to surgery.\par Creatinine 8/9/2022 - 1.19\par \par Renal US 8/17/2022\par No hydronephrosis\par \par 11/9/2022\par He is cathing anywhere from 1-2 times / day. Usually for ~500cc when caths in evening. No problems catheterizing, blood. During the day, he voids every 1.5 - 2 hours, nocturia x 3-4. No flank pain. No fevers / chills / nausea / emesis. No UTIs. No gross hematuria, no issues cathing.\par \par 1/18/2023\par Cathing once every 2 days\par Usually gets 400-500cc\par Nocturia x 0\par He voids every 2.5 hours during the day\par No gross hematuria. No UTIs. No flank pain.\par He is taking flomax / finasteride

## 2023-01-18 NOTE — ASSESSMENT
[FreeTextEntry1] : Mr Felisa is a 72 y.o. M who presents with urinary retention, with resultant bilateral hydronephrosis and AGUS.  He is now performing CIC.  He is on tamsulosin and Flomax. He is cathing every 1-2 days. PVR adequate\par \par - Continue flomax BID, finasteride\par - Continue cathing every 1-2 days and prn\par - BMP, renal US to ensure no residual hydronephrosis - if hydronephrosis or elevated Cr would recommended increased cathing frequency\par - Previously discussed outlet procedures. He would prefer to avoid surgery if possible. Discussed because Cr / renal US previously demonstrated good bladder emptying / normalization of Cr, it is safe to continue medical therapy and cathing. Will f/u results of blood work from today\par - PSA\par - RV 1 year\par

## 2023-01-18 NOTE — PHYSICAL EXAM
[Normal Appearance] : normal appearance [Abdomen Tenderness] : non-tender [Costovertebral Angle Tenderness] : no ~M costovertebral angle tenderness [Urethral Meatus] : meatus normal [Prostate Tenderness] : the prostate was not tender [No Prostate Nodules] : no prostate nodules [Prostate Size ___ gm] : prostate size [unfilled] gm

## 2023-01-19 LAB
ANION GAP SERPL CALC-SCNC: 9 MMOL/L
BUN SERPL-MCNC: 23 MG/DL
CALCIUM SERPL-MCNC: 10.3 MG/DL
CHLORIDE SERPL-SCNC: 104 MMOL/L
CO2 SERPL-SCNC: 25 MMOL/L
CREAT SERPL-MCNC: 1.17 MG/DL
EGFR: 65 ML/MIN/1.73M2
GLUCOSE SERPL-MCNC: 92 MG/DL
POTASSIUM SERPL-SCNC: 4.9 MMOL/L
PSA SERPL-MCNC: 3 NG/ML
SODIUM SERPL-SCNC: 138 MMOL/L

## 2023-04-26 NOTE — CONSULT NOTE ADULT - SUBJECTIVE AND OBJECTIVE BOX
UROLOGY CONSULT NOTE    HPI:  This is a 71y old Male with PMHx of HTN who was admitted for AGUS, urinary retention, and was found to have bilateral hydronphrosis.  The patient came in with abdominal pain and decreased urine output - a chapman was placed with 2L of output.  He admits to having nocturia 3-4x and is currently taking flomax prescribed by his PCP inconsistently. Denies recent fevers, chills, N/V, gross hematuria, dysuria.  Of note, he was recently placed on lasix for lower extremity edema.      PAST MEDICAL HISTORY    Hypertension        PAST SURGICAL HISTORY    none    FAMILY HISTORY    Father - CAD  Mother - ovarian cancer    SOCIAL HISTORY    No smoking history    HOME MEDICATIONS    bumetanide 1 mg oral tablet: 1 tab(s) orally once a day (2022 09:06)  spironolactone 25 mg oral tablet: 1 tab(s) orally once a day (2022 09:06)      DRUG ALLERGIES    NKDA    REVIEW OF SYSTEMS: Pertinent positives and negatives as stated in HPI, otherwise negative      VITAL SIGNS    T(F): 98.4, Max: 98.4 (22 @ 05:16)  HR: 103  BP: 147/79  RR: 18  SpO2: 95%    I's & O's      2022 07:  -  2022 07:00  --------------------------------------------------------  IN: 900 mL / OUT: 3900 mL / NET: -3000 mL    2022 07:  -  2022 13:44  --------------------------------------------------------  IN: 720 mL / OUT: 0 mL / NET: 720 mL        PHYSICAL EXAM    Gen: Well groomed, well dressed, well nourished  Abd: Soft, NT/ND  : Circumcised, no lesions.  No discharge or blood at urethral meatus.  Testes descended bilaterally.  Rectal: enlarged prostate, 80+ grams, smooth, nontender, no nodules        LABS:                        9.0    17.48 )-----------( 413               27.3     140  |  102  |  73  ----------------------------<  105  4.3   |  23  |  3.89    Ca    10.5    TPro  9.1  /  Alb  4.3  /  TBili  0.4  /  DBili  x   /  AST  10  /  ALT  10  /  AlkPhos  61          Urinalysis Basic:    Color: Colorless / Appearance: Clear / S.010 / pH: x  Gluc: x / Ketone: Negative  / Bili: Negative / Urobili: Negative   Blood: x / Protein: Negative / Nitrite: Negative   Leuk Esterase: Negative / RBC: 61 /hpf / WBC 1 /HPF   Sq Epi: x / Non Sq Epi: 0 /hpf / Bacteria: Negative      Urine culture: no growth      IMAGING:      US: Underlying renal parenchymal disease. Bilateral renal cysts. Bilateral   perinephric fluid.    Bilateral moderate hydroureteronephrosis. Fine echoes within the left   renal collecting system. Echogenic material within the urinary bladder   may represent blood products. Correlate with urinalysis.    Prostatomegaly.      MRI: Bilateral moderate hydroureteronephrosis.   Olanzapine Counseling- I discussed with the patient the common side effects of olanzapine including but are not limited to: lack of energy, dry mouth, increased appetite, sleepiness, tremor, constipation, dizziness, changes in behavior, or restlessness.  Explained that teenagers are more likely to experience headaches, abdominal pain, pain in the arms or legs, tiredness, and sleepiness.  Serious side effects include but are not limited: increased risk of death in elderly patients who are confused, have memory loss, or dementia-related psychosis; hyperglycemia; increased cholesterol and triglycerides; and weight gain.

## 2023-05-02 NOTE — ED ADULT NURSE NOTE - FINAL NURSING ELECTRONIC SIGNATURE
Acceptance and Commitment Therapy Acceptance and Commitment Therapy Acceptance and Commitment Therapy Acceptance and Commitment Therapy Acceptance and Commitment Therapy Acceptance and Commitment Therapy Acceptance and Commitment Therapy Acceptance and Commitment Therapy Acceptance and Commitment Therapy 31-May-2022 19:13 01-Jun-2022 07:43

## 2023-07-31 ENCOUNTER — APPOINTMENT (OUTPATIENT)
Dept: SPINE | Facility: CLINIC | Age: 73
End: 2023-07-31
Payer: MEDICARE

## 2023-07-31 NOTE — ASSESSMENT
[FreeTextEntry1] : Patient is a 72 year old male with PMH of HTN, chronic low back, hx of admission to [facility] for urinary retention, AGUS and hyperkalemia, history of acute on chronic back pain. MRIs C/T/L spine were  also performed in the hospital on 6/1/22. MRI remarkable for multilevel degenerative disc disease in cervical spine, central stenosis at C5-6. MR lumbar spine multilevel DDD, L1-2 herniated disc with moderated stenosis, L4-5 severe stenosis. \par \par \par \par ****INCOMPLETE****

## 2023-07-31 NOTE — HISTORY OF PRESENT ILLNESS
[de-identified] : Patient is a 72 year old male with PMH of HTN, chronic low back, hx of admission to [facility] for urinary retention, AGUS and hyperkalemia, history of acute on chronic back pain.\par MRIs C/T/L spine were  also performed in the hospital on 6/1/22. MRI remarkable for multilevel degenerative disc disease in cervical spine, central stenosis at C5-6. MR lumbar spine multilevel DDD, L1-2 herniated disc with moderated stenosis, L4-5 severe stenosis. \par \par \par \par  \par  \par \par \par  \par \par \par  \par \par \par  \par

## 2023-08-10 ENCOUNTER — APPOINTMENT (OUTPATIENT)
Dept: SPINE | Facility: CLINIC | Age: 73
End: 2023-08-10
Payer: MEDICARE

## 2023-08-10 VITALS
WEIGHT: 140 LBS | SYSTOLIC BLOOD PRESSURE: 131 MMHG | DIASTOLIC BLOOD PRESSURE: 73 MMHG | HEART RATE: 72 BPM | BODY MASS INDEX: 23.32 KG/M2 | OXYGEN SATURATION: 96 % | HEIGHT: 65 IN

## 2023-08-10 PROCEDURE — 99204 OFFICE O/P NEW MOD 45 MIN: CPT

## 2023-08-10 RX ORDER — ATORVASTATIN CALCIUM 20 MG/1
20 TABLET, FILM COATED ORAL
Refills: 0 | Status: ACTIVE | COMMUNITY

## 2023-08-10 RX ORDER — VALSARTAN 40 MG/1
40 TABLET, COATED ORAL
Refills: 0 | Status: ACTIVE | COMMUNITY

## 2023-08-10 RX ORDER — METHOCARBAMOL 500 MG/1
500 TABLET, FILM COATED ORAL
Qty: 30 | Refills: 0 | Status: ACTIVE | COMMUNITY
Start: 2023-08-10 | End: 1900-01-01

## 2023-08-10 RX ORDER — METOPROLOL TARTRATE 25 MG/1
25 TABLET, FILM COATED ORAL
Refills: 0 | Status: ACTIVE | COMMUNITY

## 2023-08-10 RX ORDER — ASPIRIN 81 MG
81 TABLET,CHEWABLE ORAL
Refills: 0 | Status: ACTIVE | COMMUNITY

## 2023-08-10 RX ORDER — BACLOFEN 10 MG/1
10 TABLET ORAL 3 TIMES DAILY
Qty: 30 | Refills: 2 | Status: DISCONTINUED | COMMUNITY
Start: 2022-08-29 | End: 2023-08-10

## 2023-08-29 ENCOUNTER — OUTPATIENT (OUTPATIENT)
Dept: OUTPATIENT SERVICES | Facility: HOSPITAL | Age: 73
LOS: 1 days | End: 2023-08-29
Payer: MEDICARE

## 2023-08-29 ENCOUNTER — APPOINTMENT (OUTPATIENT)
Dept: MRI IMAGING | Facility: CLINIC | Age: 73
End: 2023-08-29
Payer: MEDICARE

## 2023-08-29 DIAGNOSIS — M54.16 RADICULOPATHY, LUMBAR REGION: ICD-10-CM

## 2023-08-29 PROCEDURE — 72148 MRI LUMBAR SPINE W/O DYE: CPT | Mod: 26

## 2023-08-29 PROCEDURE — 72148 MRI LUMBAR SPINE W/O DYE: CPT

## 2023-08-31 NOTE — ADDENDUM
[FreeTextEntry1] : I have personally seen , performed an exam and reviewed his imaging on PACS. The documentation reflects my findings and plan of care.  I have spent 45 mins in this encounter.  Conor Bernstein MD

## 2023-08-31 NOTE — ASSESSMENT
[FreeTextEntry1] : 73 y/o M with lower back pain and left hip pain for the past two years. His pain is mostly in the left buttock with intermittent radiation down the left thigh when he walks long distances. He has left hip osteoarthritis and was scheduled for left hip replacement in July, but the case was canceled due anemia. Per patient and his spouse, his anemia is now stable and will revisit the orthopedic doctor for his left hip. He has been using a cane for the past two years secondary to pain. Had two epidural steroid injections without any relief. No recent imaging studies but his old MRI with degenerative disease throughout the lumbar spine, multiple chronic compression fractures, L1-2 moderate central stenosis, L4/5 listhesis and severer stenosis.  - Patient will need updated MRI to see if there are any significant changes - Advised to f/u with orthopedic doctor regarding hip replacement  - Standing scoliosis x-rays with flexion and extension - Patient to f/u either after imaging studies or after his hip replacement

## 2023-08-31 NOTE — HISTORY OF PRESENT ILLNESS
[FreeTextEntry1] : back pain  [de-identified] : Patient is a 45-year-old male with history of chronic back pain, radiculopathy and spondylolisthesis. He reports lower back pain and left hip pain for the past two years. His pain is mostly in the left buttock with intermittent radiation down the left thigh when he walks long distances. He has left hip osteoarthritis and was scheduled for left hip replacement in July, but the case was canceled due anemia. Per patient and his spouse, his anemia is now stable and will revisit the orthopedic doctor for his left hip. He has been using a cane for the past two years secondary to pain. Had two epidural steroid injections without any relief.  His pain is worse with movement, bending, sitting and walking for long periods of time. Pain is better with rest. No recent imaging studies but his old MRI with degenerative disease throughout the lumbar spine, multiple chronic compression fractures, L1-2 moderate central stenosis, L4/5 lesthesis and severer stenosis.

## 2023-08-31 NOTE — PHYSICAL EXAM
[General Appearance - Alert] : alert [General Appearance - In No Acute Distress] : in no acute distress [Oriented To Time, Place, And Person] : oriented to person, place, and time [Cranial Nerves Optic (II)] : visual acuity intact bilaterally,  pupils equal round and reactive to light [Cranial Nerves Oculomotor (III)] : extraocular motion intact [Cranial Nerves Facial (VII)] : face symmetrical [Cranial Nerves Vestibulocochlear (VIII)] : hearing was intact bilaterally [Cranial Nerves Accessory (XI - Cranial And Spinal)] : head turning and shoulder shrug symmetric [Motor Tone] : muscle tone was normal in all four extremities [Cranial Nerves Hypoglossal (XII)] : there was no tongue deviation with protrusion [Motor Strength] : muscle strength was normal in all four extremities [Sensation Tactile Decrease] : light touch was intact [Balance] : balance was intact [2+] : Ankle jerk right 2+ [3+] : Ankle jerk left 3+ [No Visual Abnormalities] : no visible abnormailities [Straight-Leg Raise Test - Left] : straight leg raise of the left leg was positive [Sclera] : the sclera and conjunctiva were normal [Extraocular Movements] : extraocular movements were intact [Outer Ear] : the ears and nose were normal in appearance [Hearing Threshold Finger Rub Not [place]] : hearing was normal [Neck Appearance] : the appearance of the neck was normal [] : no respiratory distress [Exaggerated Use Of Accessory Muscles For Inspiration] : no accessory muscle use [Abnormal Walk] : normal gait [Skin Color & Pigmentation] : normal skin color and pigmentation [FreeTextEntry5] : Left HF 4+/5 otherwise 5/5 strength.  [FreeTextEntry8] : using a cane  [Straight-Leg Raise Test - Right] : straight leg raise  of the right leg was negative [FreeTextEntry1] : using a cane

## 2023-09-06 PROBLEM — M48.062 SPINAL STENOSIS OF LUMBAR REGION WITH NEUROGENIC CLAUDICATION: Status: ACTIVE | Noted: 2022-06-08

## 2023-09-06 PROBLEM — M51.37 DISC DEGENERATION, LUMBOSACRAL: Status: ACTIVE | Noted: 2022-06-08

## 2023-09-06 PROBLEM — M80.08XD: Status: ACTIVE | Noted: 2022-08-29

## 2023-09-06 PROBLEM — M43.10 DEGENERATIVE SPONDYLOLISTHESIS: Status: ACTIVE | Noted: 2022-06-08

## 2023-09-06 PROBLEM — M54.16 LUMBAR RADICULITIS: Status: ACTIVE | Noted: 2022-06-08

## 2023-09-06 NOTE — ASSESSMENT
[FreeTextEntry1] : 73 y/o M with lower back pain and left hip pain for the past two years. His pain is mostly in the left buttock with intermittent radiation down the left thigh when he walks long distances. He has left hip osteoarthritis and was scheduled for left hip replacement in July, but the case was canceled due anemia. Per patient and his spouse, his anemia is now stable and will revisit the orthopedic doctor for his left hip. He has been using a cane for the past two years secondary to pain. Had two epidural steroid injections without any relief. Old MRI with degenerative disease throughout the lumbar spine, multiple chronic compression fractures, L1-2 moderate central stenosis, L4/5 listhesis and severer stenosis. -  Updated MRI with similar compression fractures, no acute fractures. Progression of spondylosis and spondylolisthesis. Thick-walled bladder (patient with history of urinary retention and AGUS, last visit with urology on 1/18/23).  - Advised to f/u with orthopedic doctor regarding hip replacement  - Standing scoliosis x-rays with flexion and extension ordered - pending  - DEXA scan - pending   ****INCOMPLETE****

## 2023-09-06 NOTE — HISTORY OF PRESENT ILLNESS
[FreeTextEntry1] : back pain  [de-identified] : Patient is a 45-year-old male with history of chronic back pain, radiculopathy and spondylolisthesis. He reports lower back pain and left hip pain for the past two years. His pain is mostly in the left buttock with intermittent radiation down the left thigh when he walks long distances. He has left hip osteoarthritis and was scheduled for left hip replacement in July, but the case was canceled due anemia. Per patient and his spouse, his anemia is now stable and will revisit the orthopedic doctor for his left hip. He has been using a cane for the past two years secondary to pain. Had two epidural steroid injections without any relief.  His pain is worse with movement, bending, sitting and walking for long periods of time. Pain is better with rest. No recent imaging studies but his old MRI with degenerative disease throughout the lumbar spine, multiple chronic compression fractures, L1-2 moderate central stenosis, L4/5 lesthesis and severer stenosis.

## 2023-09-06 NOTE — PHYSICAL EXAM
[General Appearance - Alert] : alert [General Appearance - In No Acute Distress] : in no acute distress [Oriented To Time, Place, And Person] : oriented to person, place, and time [Cranial Nerves Optic (II)] : visual acuity intact bilaterally,  pupils equal round and reactive to light [Cranial Nerves Oculomotor (III)] : extraocular motion intact [Cranial Nerves Facial (VII)] : face symmetrical [Cranial Nerves Vestibulocochlear (VIII)] : hearing was intact bilaterally [Cranial Nerves Accessory (XI - Cranial And Spinal)] : head turning and shoulder shrug symmetric [Cranial Nerves Hypoglossal (XII)] : there was no tongue deviation with protrusion [Motor Tone] : muscle tone was normal in all four extremities [Motor Strength] : muscle strength was normal in all four extremities [Sensation Tactile Decrease] : light touch was intact [Balance] : balance was intact [2+] : Ankle jerk right 2+ [3+] : Ankle jerk left 3+ [FreeTextEntry5] : Left HF 4+/5 otherwise 5/5 strength.  [FreeTextEntry8] : using a cane  [No Visual Abnormalities] : no visible abnormailities [Straight-Leg Raise Test - Left] : straight leg raise of the left leg was positive [Straight-Leg Raise Test - Right] : straight leg raise  of the right leg was negative [Sclera] : the sclera and conjunctiva were normal [Extraocular Movements] : extraocular movements were intact [Outer Ear] : the ears and nose were normal in appearance [Hearing Threshold Finger Rub Not [place]] : hearing was normal [Neck Appearance] : the appearance of the neck was normal [] : no respiratory distress [Exaggerated Use Of Accessory Muscles For Inspiration] : no accessory muscle use [Abnormal Walk] : normal gait [FreeTextEntry1] : using a cane  [Skin Color & Pigmentation] : normal skin color and pigmentation

## 2023-09-11 ENCOUNTER — APPOINTMENT (OUTPATIENT)
Dept: SPINE | Facility: CLINIC | Age: 73
End: 2023-09-11

## 2023-09-11 DIAGNOSIS — M54.16 RADICULOPATHY, LUMBAR REGION: ICD-10-CM

## 2023-09-11 DIAGNOSIS — M51.37 OTHER INTERVERTEBRAL DISC DEGENERATION, LUMBOSACRAL REGION: ICD-10-CM

## 2023-09-11 DIAGNOSIS — M43.10 SPONDYLOLISTHESIS, SITE UNSPECIFIED: ICD-10-CM

## 2023-09-11 DIAGNOSIS — M80.08XD AGE-RELATED OSTEOPOROSIS WITH CURRENT PATHOLOGICAL FRACTURE, VERTEBRA(E), SUBSEQUENT ENCOUNTER FOR FRACTURE WITH ROUTINE HEALING: ICD-10-CM

## 2023-09-11 DIAGNOSIS — M48.062 SPINAL STENOSIS, LUMBAR REGION WITH NEUROGENIC CLAUDICATION: ICD-10-CM

## 2023-10-02 ENCOUNTER — APPOINTMENT (OUTPATIENT)
Dept: UROLOGY | Facility: CLINIC | Age: 73
End: 2023-10-02
Payer: MEDICARE

## 2023-10-02 PROCEDURE — 76775 US EXAM ABDO BACK WALL LIM: CPT

## 2023-10-02 PROCEDURE — 99213 OFFICE O/P EST LOW 20 MIN: CPT | Mod: 25

## 2023-10-27 ENCOUNTER — OUTPATIENT (OUTPATIENT)
Dept: OUTPATIENT SERVICES | Facility: HOSPITAL | Age: 73
LOS: 1 days | End: 2023-10-27
Payer: MEDICARE

## 2023-10-27 VITALS
SYSTOLIC BLOOD PRESSURE: 125 MMHG | WEIGHT: 136.91 LBS | TEMPERATURE: 98 F | OXYGEN SATURATION: 96 % | HEIGHT: 61 IN | RESPIRATION RATE: 18 BRPM | HEART RATE: 88 BPM | DIASTOLIC BLOOD PRESSURE: 73 MMHG

## 2023-10-27 DIAGNOSIS — Z98.890 OTHER SPECIFIED POSTPROCEDURAL STATES: Chronic | ICD-10-CM

## 2023-10-27 DIAGNOSIS — C90.00 MULTIPLE MYELOMA NOT HAVING ACHIEVED REMISSION: ICD-10-CM

## 2023-10-27 DIAGNOSIS — N20.1 CALCULUS OF URETER: ICD-10-CM

## 2023-10-27 DIAGNOSIS — Z01.818 ENCOUNTER FOR OTHER PREPROCEDURAL EXAMINATION: ICD-10-CM

## 2023-10-27 LAB
ANION GAP SERPL CALC-SCNC: 11 MMOL/L — SIGNIFICANT CHANGE UP (ref 5–17)
ANION GAP SERPL CALC-SCNC: 11 MMOL/L — SIGNIFICANT CHANGE UP (ref 5–17)
BUN SERPL-MCNC: 27 MG/DL — HIGH (ref 7–23)
BUN SERPL-MCNC: 27 MG/DL — HIGH (ref 7–23)
CALCIUM SERPL-MCNC: 8.9 MG/DL — SIGNIFICANT CHANGE UP (ref 8.4–10.5)
CALCIUM SERPL-MCNC: 8.9 MG/DL — SIGNIFICANT CHANGE UP (ref 8.4–10.5)
CHLORIDE SERPL-SCNC: 105 MMOL/L — SIGNIFICANT CHANGE UP (ref 96–108)
CHLORIDE SERPL-SCNC: 105 MMOL/L — SIGNIFICANT CHANGE UP (ref 96–108)
CO2 SERPL-SCNC: 21 MMOL/L — LOW (ref 22–31)
CO2 SERPL-SCNC: 21 MMOL/L — LOW (ref 22–31)
CREAT SERPL-MCNC: 1.25 MG/DL — SIGNIFICANT CHANGE UP (ref 0.5–1.3)
CREAT SERPL-MCNC: 1.25 MG/DL — SIGNIFICANT CHANGE UP (ref 0.5–1.3)
EGFR: 60 ML/MIN/1.73M2 — SIGNIFICANT CHANGE UP
EGFR: 60 ML/MIN/1.73M2 — SIGNIFICANT CHANGE UP
GLUCOSE SERPL-MCNC: 114 MG/DL — HIGH (ref 70–99)
GLUCOSE SERPL-MCNC: 114 MG/DL — HIGH (ref 70–99)
HCT VFR BLD CALC: 28 % — LOW (ref 39–50)
HCT VFR BLD CALC: 28 % — LOW (ref 39–50)
HGB BLD-MCNC: 9 G/DL — LOW (ref 13–17)
HGB BLD-MCNC: 9 G/DL — LOW (ref 13–17)
MCHC RBC-ENTMCNC: 29.2 PG — SIGNIFICANT CHANGE UP (ref 27–34)
MCHC RBC-ENTMCNC: 29.2 PG — SIGNIFICANT CHANGE UP (ref 27–34)
MCHC RBC-ENTMCNC: 32.1 GM/DL — SIGNIFICANT CHANGE UP (ref 32–36)
MCHC RBC-ENTMCNC: 32.1 GM/DL — SIGNIFICANT CHANGE UP (ref 32–36)
MCV RBC AUTO: 90.9 FL — SIGNIFICANT CHANGE UP (ref 80–100)
MCV RBC AUTO: 90.9 FL — SIGNIFICANT CHANGE UP (ref 80–100)
NRBC # BLD: 0 /100 WBCS — SIGNIFICANT CHANGE UP (ref 0–0)
NRBC # BLD: 0 /100 WBCS — SIGNIFICANT CHANGE UP (ref 0–0)
PLATELET # BLD AUTO: 428 K/UL — HIGH (ref 150–400)
PLATELET # BLD AUTO: 428 K/UL — HIGH (ref 150–400)
POTASSIUM SERPL-MCNC: 4.3 MMOL/L — SIGNIFICANT CHANGE UP (ref 3.5–5.3)
POTASSIUM SERPL-MCNC: 4.3 MMOL/L — SIGNIFICANT CHANGE UP (ref 3.5–5.3)
POTASSIUM SERPL-SCNC: 4.3 MMOL/L — SIGNIFICANT CHANGE UP (ref 3.5–5.3)
POTASSIUM SERPL-SCNC: 4.3 MMOL/L — SIGNIFICANT CHANGE UP (ref 3.5–5.3)
RBC # BLD: 3.08 M/UL — LOW (ref 4.2–5.8)
RBC # BLD: 3.08 M/UL — LOW (ref 4.2–5.8)
RBC # FLD: 13.9 % — SIGNIFICANT CHANGE UP (ref 10.3–14.5)
RBC # FLD: 13.9 % — SIGNIFICANT CHANGE UP (ref 10.3–14.5)
SODIUM SERPL-SCNC: 137 MMOL/L — SIGNIFICANT CHANGE UP (ref 135–145)
SODIUM SERPL-SCNC: 137 MMOL/L — SIGNIFICANT CHANGE UP (ref 135–145)
WBC # BLD: 13.07 K/UL — HIGH (ref 3.8–10.5)
WBC # BLD: 13.07 K/UL — HIGH (ref 3.8–10.5)
WBC # FLD AUTO: 13.07 K/UL — HIGH (ref 3.8–10.5)
WBC # FLD AUTO: 13.07 K/UL — HIGH (ref 3.8–10.5)

## 2023-10-27 PROCEDURE — 80048 BASIC METABOLIC PNL TOTAL CA: CPT

## 2023-10-27 PROCEDURE — 87086 URINE CULTURE/COLONY COUNT: CPT

## 2023-10-27 PROCEDURE — 85027 COMPLETE CBC AUTOMATED: CPT

## 2023-10-27 PROCEDURE — 87077 CULTURE AEROBIC IDENTIFY: CPT

## 2023-10-27 PROCEDURE — 36415 COLL VENOUS BLD VENIPUNCTURE: CPT

## 2023-10-27 PROCEDURE — G0463: CPT

## 2023-10-27 RX ORDER — SODIUM CHLORIDE 9 MG/ML
1000 INJECTION, SOLUTION INTRAVENOUS
Refills: 0 | Status: DISCONTINUED | OUTPATIENT
Start: 2023-11-17 | End: 2023-12-01

## 2023-10-27 NOTE — H&P PST ADULT - HISTORY OF PRESENT ILLNESS
73year old Male with PMH of HTN, HLD, BPH, Osteoporosis, chronic lower back pain and left hip pain (for last two years), radiculopathy and spondylolisthesis. His pain is mostly in the left buttock with intermittent radiation down the left thigh when he walks long distances and has been using a cane for the past two years . He has left hip osteoarthritis and was scheduled for left hip replacement in July but the case was canceled due anemia. Per patient and his spouse, his anemia is now stable. He was also diagnosed w/ Multiple Myeloma and is currently receiving chemotherapy once weekly. A 5 mm left distal ureteral stone was found incidentally upon workup (h/o admission to [facility] in May 2022 with AGUS, urinary tension and bilateral hydronephrosis and was initiated on Flomax, finasteride, and CIC) and presents to PST today for a scheduled Left Ureteroscopy Laser Lithotripsy of Stone w/ Ureteral Stent Placement on 11/17/2023. He is performing intermittent straight catheterization as needed. Currently he denies flank pain, fevers, chills, N&V, chest pain or SOB and feels well otherwise. He is to make an jose w/ PCP fro med eval. Will obtain last Hem/Onc note.  73year old Male with PMH of HTN, HLD, BPH, Osteoporosis, chronic lower back pain and left hip pain (for last two years), radiculopathy and spondylolisthesis. His pain is mostly in the left buttock with intermittent radiation down the left thigh when he walks long distances and has been using a cane for the past two years . He has left hip osteoarthritis and was scheduled for left hip replacement in July but the case was canceled due anemia. Per patient and his spouse, his anemia is now stable. He was also diagnosed w/ Multiple Myeloma and is currently receiving chemotherapy once weekly. A 5 mm left distal ureteral stone was found incidentally upon workup (h/o admission to [facility] in May 2022 with AGUS, urinary tension and bilateral hydronephrosis and was initiated on Flomax, finasteride, and CIC) and presents to [facility] today for a scheduled Left Ureteroscopy Laser Lithotripsy of Stone w/ Ureteral Stent Placement on 11/17/2023. He is performing intermittent straight catheterization as needed. Currently he denies flank pain, fevers, chills, N&V, chest pain or SOB and feels well otherwise. He is to make an jose w/ PCP fro med eval. Will obtain last Hem/Onc note.   Addendum- Advised Dr. Waldrop via email and fax , urine culture >100,000 CFU/ml Staphylococcus epidermidis. Please advise patient. Faxing results to Dr. Luu office.   73year old Male with PMH of HTN, HLD, BPH, Osteoporosis, chronic lower back pain and left hip pain (for last two years), radiculopathy and spondylolisthesis. His pain is mostly in the left buttock with intermittent radiation down the left thigh when he walks long distances and has been using a cane for the past two years . He has left hip osteoarthritis and was scheduled for left hip replacement in July but the case was canceled due anemia. Per patient and his spouse, his anemia is now stable. He was also diagnosed w/ Multiple Myeloma and is currently receiving chemotherapy once weekly. A 5 mm left distal ureteral stone was found incidentally upon workup (h/o admission to [facility] in May 2022 with AGUS, urinary tension and bilateral hydronephrosis and was initiated on Flomax, finasteride, and CIC) and presents to [facility] today for a scheduled Left Ureteroscopy Laser Lithotripsy of Stone w/ Ureteral Stent Placement on 11/17/2023. He is performing intermittent straight catheterization as needed. Currently he denies flank pain, fevers, chills, N&V, chest pain or SOB and feels well otherwise. He is to make an jose w/ PCP fro med eval. Will obtain last Hem/Onc note.   Addendum- Advised Dr. Waldrop via email and fax , urine culture >100,000 CFU/ml Staphylococcus epidermidis. Please advise patient. Faxing results to Dr. Luu office.      11/13/23 Addendum: As per cardio note/cath report from 03/05/23 (EF 35-40%) "he has a collateralized chronically occluded mid LAD. 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 No obvious viability is noted. As such, medical therapy is recommended". Patient was started on Aspirin, Valsartan, Metoprolol. Echo from 07/26/23 revealed improved  EF 55%. Case discussed with Dr. Rausch (anesthesia). OK to proceed in [place].   N.M.

## 2023-10-27 NOTE — H&P PST ADULT - NEGATIVE MALE-SPECIFIC SYMPTOMS
Patient taken down to Cath lab via wheelchair, Heparin drip and Normal saline infusing via Right PIV 18g, telemetry on.    urine retention

## 2023-10-27 NOTE — H&P PST ADULT - NSICDXPASTMEDICALHX_GEN_ALL_CORE_FT
PAST MEDICAL HISTORY:  Anemia     Arthritis     BPH (benign prostatic hyperplasia)     Calculus of ureter     History of osteoporosis     Hyperlipidemia     Hypertension     Lumbar stenosis     Multiple myeloma      PAST MEDICAL HISTORY:  Anemia     Arthritis     BPH (benign prostatic hyperplasia)     CAD, multiple vessel     Calculus of ureter     History of osteoporosis     Hyperlipidemia     Hypertension     Lumbar stenosis     Multiple myeloma

## 2023-10-27 NOTE — H&P PST ADULT - NSICDXFAMILYHX_GEN_ALL_CORE_FT
FAMILY HISTORY:  Father  Still living? No  Family history of myocardial infarction, Age at diagnosis: Age Unknown    Mother  Still living? No  Family history of ovarian cancer, Age at diagnosis: Age Unknown

## 2023-10-27 NOTE — H&P PST ADULT - NSANTHOSAYNRD_GEN_A_CORE
The patient is cleared for cataract surgery. MF IOL ok. Pt defers at this time. No. FAVIAN screening performed.  STOP BANG Legend: 0-2 = LOW Risk; 3-4 = INTERMEDIATE Risk; 5-8 = HIGH Risk

## 2023-10-29 LAB
CULTURE RESULTS: ABNORMAL
CULTURE RESULTS: ABNORMAL
SPECIMEN SOURCE: SIGNIFICANT CHANGE UP
SPECIMEN SOURCE: SIGNIFICANT CHANGE UP

## 2023-11-02 RX ORDER — SULFAMETHOXAZOLE AND TRIMETHOPRIM 800; 160 MG/1; MG/1
800-160 TABLET ORAL
Qty: 14 | Refills: 0 | Status: ACTIVE | COMMUNITY
Start: 2023-11-02 | End: 1900-01-01

## 2023-11-03 PROBLEM — M19.90 UNSPECIFIED OSTEOARTHRITIS, UNSPECIFIED SITE: Chronic | Status: ACTIVE | Noted: 2023-10-27

## 2023-11-03 PROBLEM — D64.9 ANEMIA, UNSPECIFIED: Chronic | Status: ACTIVE | Noted: 2023-10-27

## 2023-11-03 PROBLEM — N20.1 CALCULUS OF URETER: Chronic | Status: ACTIVE | Noted: 2023-10-27

## 2023-11-03 PROBLEM — M48.061 SPINAL STENOSIS, LUMBAR REGION WITHOUT NEUROGENIC CLAUDICATION: Chronic | Status: ACTIVE | Noted: 2023-10-27

## 2023-11-03 PROBLEM — Z87.39 PERSONAL HISTORY OF OTHER DISEASES OF THE MUSCULOSKELETAL SYSTEM AND CONNECTIVE TISSUE: Chronic | Status: ACTIVE | Noted: 2023-10-27

## 2023-11-03 PROBLEM — N40.0 BENIGN PROSTATIC HYPERPLASIA WITHOUT LOWER URINARY TRACT SYMPTOMS: Chronic | Status: ACTIVE | Noted: 2023-10-27

## 2023-11-03 PROBLEM — E78.5 HYPERLIPIDEMIA, UNSPECIFIED: Chronic | Status: ACTIVE | Noted: 2023-10-27

## 2023-11-03 PROBLEM — C90.00 MULTIPLE MYELOMA NOT HAVING ACHIEVED REMISSION: Chronic | Status: ACTIVE | Noted: 2023-10-27

## 2023-11-16 ENCOUNTER — TRANSCRIPTION ENCOUNTER (OUTPATIENT)
Age: 73
End: 2023-11-16

## 2023-11-17 ENCOUNTER — OUTPATIENT (OUTPATIENT)
Dept: OUTPATIENT SERVICES | Facility: HOSPITAL | Age: 73
LOS: 1 days | End: 2023-11-17
Payer: MEDICARE

## 2023-11-17 ENCOUNTER — APPOINTMENT (OUTPATIENT)
Dept: UROLOGY | Facility: HOSPITAL | Age: 73
End: 2023-11-17

## 2023-11-17 ENCOUNTER — RESULT REVIEW (OUTPATIENT)
Age: 73
End: 2023-11-17

## 2023-11-17 ENCOUNTER — TRANSCRIPTION ENCOUNTER (OUTPATIENT)
Age: 73
End: 2023-11-17

## 2023-11-17 VITALS
TEMPERATURE: 98 F | RESPIRATION RATE: 16 BRPM | HEIGHT: 61 IN | DIASTOLIC BLOOD PRESSURE: 50 MMHG | SYSTOLIC BLOOD PRESSURE: 101 MMHG | OXYGEN SATURATION: 93 % | HEART RATE: 66 BPM | WEIGHT: 136.91 LBS

## 2023-11-17 VITALS
SYSTOLIC BLOOD PRESSURE: 111 MMHG | DIASTOLIC BLOOD PRESSURE: 55 MMHG | OXYGEN SATURATION: 95 % | RESPIRATION RATE: 19 BRPM | HEART RATE: 58 BPM

## 2023-11-17 DIAGNOSIS — Z98.890 OTHER SPECIFIED POSTPROCEDURAL STATES: Chronic | ICD-10-CM

## 2023-11-17 DIAGNOSIS — N20.1 CALCULUS OF URETER: ICD-10-CM

## 2023-11-17 PROCEDURE — C1889: CPT

## 2023-11-17 PROCEDURE — 74420 UROGRAPHY RTRGR +-KUB: CPT | Mod: 26

## 2023-11-17 PROCEDURE — 52352 CYSTOURETERO W/STONE REMOVE: CPT | Mod: LT

## 2023-11-17 PROCEDURE — C1758: CPT

## 2023-11-17 PROCEDURE — 82365 CALCULUS SPECTROSCOPY: CPT

## 2023-11-17 PROCEDURE — C9399: CPT

## 2023-11-17 PROCEDURE — 88300 SURGICAL PATH GROSS: CPT | Mod: 26

## 2023-11-17 PROCEDURE — C2617: CPT

## 2023-11-17 PROCEDURE — 76000 FLUOROSCOPY <1 HR PHYS/QHP: CPT

## 2023-11-17 PROCEDURE — 88300 SURGICAL PATH GROSS: CPT

## 2023-11-17 PROCEDURE — C1769: CPT

## 2023-11-17 PROCEDURE — 52332 CYSTOSCOPY AND TREATMENT: CPT | Mod: LT

## 2023-11-17 DEVICE — STENT URET INLAY OPTIMA 6FRX26CM: Type: IMPLANTABLE DEVICE | Status: FUNCTIONAL

## 2023-11-17 DEVICE — GUIDEWIRE SENSOR DUAL-FLEX NITINOL STRAIGHT .035" X 150CM: Type: IMPLANTABLE DEVICE | Status: FUNCTIONAL

## 2023-11-17 DEVICE — STENT URET INLAY OPTIMA 6FRX28CM: Type: IMPLANTABLE DEVICE | Status: FUNCTIONAL

## 2023-11-17 DEVICE — STONE BASKET ZEROTIP NITINOL 4-WIRE 1.9FR 120CM X 12MM: Type: IMPLANTABLE DEVICE | Status: FUNCTIONAL

## 2023-11-17 DEVICE — URETERAL CATH OPEN END 5FR 70CM: Type: IMPLANTABLE DEVICE | Status: FUNCTIONAL

## 2023-11-17 RX ORDER — LIDOCAINE HCL 20 MG/ML
0.2 VIAL (ML) INJECTION ONCE
Refills: 0 | Status: COMPLETED | OUTPATIENT
Start: 2023-11-17 | End: 2023-11-17

## 2023-11-17 RX ORDER — SULFAMETHOXAZOLE AND TRIMETHOPRIM 800; 160 MG/1; MG/1
800-160 TABLET ORAL
Qty: 6 | Refills: 0 | Status: ACTIVE | COMMUNITY
Start: 2023-11-17 | End: 1900-01-01

## 2023-11-17 RX ORDER — SODIUM CHLORIDE 9 MG/ML
1000 INJECTION, SOLUTION INTRAVENOUS
Refills: 0 | Status: DISCONTINUED | OUTPATIENT
Start: 2023-11-17 | End: 2023-12-01

## 2023-11-17 RX ORDER — ONDANSETRON 8 MG/1
4 TABLET, FILM COATED ORAL ONCE
Refills: 0 | Status: DISCONTINUED | OUTPATIENT
Start: 2023-11-17 | End: 2023-12-01

## 2023-11-17 RX ORDER — CEFAZOLIN SODIUM 1 G
2000 VIAL (EA) INJECTION ONCE
Refills: 0 | Status: COMPLETED | OUTPATIENT
Start: 2023-11-17 | End: 2023-11-17

## 2023-11-17 RX ORDER — HYDROMORPHONE HYDROCHLORIDE 2 MG/ML
0.25 INJECTION INTRAMUSCULAR; INTRAVENOUS; SUBCUTANEOUS
Refills: 0 | Status: DISCONTINUED | OUTPATIENT
Start: 2023-11-17 | End: 2023-11-17

## 2023-11-17 RX ADMIN — SODIUM CHLORIDE 100 MILLILITER(S): 9 INJECTION, SOLUTION INTRAVENOUS at 05:55

## 2023-11-17 NOTE — ASU PATIENT PROFILE, ADULT - NSICDXPASTMEDICALHX_GEN_ALL_CORE_FT
PAST MEDICAL HISTORY:  Anemia     Arthritis     BPH (benign prostatic hyperplasia)     CAD, multiple vessel     Calculus of ureter     History of osteoporosis     Hyperlipidemia     Hypertension     Lumbar stenosis     Multiple myeloma

## 2023-11-17 NOTE — ASU DISCHARGE PLAN (ADULT/PEDIATRIC) - ASU DC SPECIAL INSTRUCTIONSFT
Discharge Instructions: Ureteroscopy    · Stent: You have an internal stent (a hollow tube that runs from the kidney to your bladder) after your procedure, which helps urine drain from the kidney to your bladder. Some patients experience urinary frequency, burning, or even back pain (especially with urination). These sensations will gradually get better. Increasing your fluid intake can also improve these symptoms. While the stent is in place, your urine may continue to be bloody. This stent is temporary and will be removed on 11/21    · General: It is common to have blood in the urine after your procedure. It may be pink or even red; inform your doctor if you have a significant amount of clot in the urine or if you are unable to void at all. The urine may clear and then become bloody again especially as you are more physically active.    · Bathing: You may shower or bathe.    · Diet: You may resume your regular diet and regular medication regimen.    · Pain: You may take Tylenol (acetaminophen) 650-975mg and/or Motrin (ibuprofen) 400-600mg, available over the counter, for pain every 6 hours as needed. Do not exceed 4000 milligrams of Tylenol (acetaminophen) daily. You may alternate these medications such that you take either one every 3 hours.    · Antibiotics: You will be given enough antibiotic through the stent removal    · Activity: No heavy lifting or strenuous exercise until you are evaluated at your post-operative appointment. Otherwise, you may return to your usual level of activity.    · Follow-up: If you did not already schedule your post-operative appointment, please call your urologist to schedule a follow-up appointment.    · Call your urologist if: You have any bleeding that does not stop, inability to void >8 hours, fever over 100.4 F, chills, persistent nausea/vomiting, or if your pain is not controlled on your discharge pain medications. Discharge Instructions: Ureteroscopy  ******************************************************************************************   · Stent: You have an internal stent (a hollow tube that runs from the kidney to your bladder) after your procedure, which helps urine drain from the kidney to your bladder. Some patients experience urinary frequency, burning, or even back pain (especially with urination). These sensations will gradually get better. Increasing your fluid intake can also improve these symptoms. While the stent is in place, your urine may continue to be bloody. This stent is temporary and will be removed on 11/21  ******************************************************************************************   · General: It is common to have blood in the urine after your procedure. It may be pink or even red; inform your doctor if you have a significant amount of clot in the urine or if you are unable to void at all. The urine may clear and then become bloody again especially as you are more physically active.  ******************************************************************************************   · Bathing: You may shower or bathe.  ******************************************************************************************   · Diet: You may resume your regular diet and regular medication regimen.  ******************************************************************************************   · Pain: You may take Tylenol (acetaminophen) 650-975mg and/or Motrin (ibuprofen) 400-600mg, available over the counter, for pain every 6 hours as needed. Do not exceed 4000 milligrams of Tylenol (acetaminophen) daily. You may alternate these medications such that you take either one every 3 hours.  ******************************************************************************************   · Antibiotics: You will be given enough antibiotic through the stent removal  ******************************************************************************************   · Activity: No heavy lifting or strenuous exercise until you are evaluated at your post-operative appointment. Otherwise, you may return to your usual level of activity.  ******************************************************************************************   · Follow-up: If you did not already schedule your post-operative appointment, please call your urologist to schedule a follow-up appointment.  ******************************************************************************************   · Call your urologist if: You have any bleeding that does not stop, inability to void >8 hours, fever over 100.4 F, chills, persistent nausea/vomiting, or if your pain is not controlled on your discharge pain medications.

## 2023-11-17 NOTE — ASU PATIENT PROFILE, ADULT - FALL HARM RISK - HARM RISK INTERVENTIONS

## 2023-11-17 NOTE — PRE-ANESTHESIA EVALUATION ADULT - BP NONINVASIVE DIASTOLIC (MM HG)
Hpi Title: Evaluation of Skin Lesions How Severe Are Your Spot(S)?: moderate Have Your Spot(S) Been Treated In The Past?: has not been treated 50

## 2023-11-17 NOTE — ASU DISCHARGE PLAN (ADULT/PEDIATRIC) - NURSING INSTRUCTIONS
OK to take Tylenol/Acetaminophen at 1:30PM TODAY (last dose @   7:30AM  in operating room)  for pain and every 6 hours after as needed. OK to take Motrin/Ibuprofen at ANY TIME TODAY for pain and every 6 hours after as needed.

## 2023-11-17 NOTE — PRE-ANESTHESIA EVALUATION ADULT - NSANTHPMHFT_GEN_ALL_CORE
73M PMH MM, HTN, HLD, BPH, former smoker, chronic back pain (fentanyl patch is currently off), CAD (currently normal LVSF, stress test/cath report reviewed).  Denies any current CP/SOB.

## 2023-11-20 PROBLEM — I25.10 ATHEROSCLEROTIC HEART DISEASE OF NATIVE CORONARY ARTERY WITHOUT ANGINA PECTORIS: Chronic | Status: ACTIVE | Noted: 2023-11-13

## 2023-11-21 ENCOUNTER — APPOINTMENT (OUTPATIENT)
Dept: UROLOGY | Facility: CLINIC | Age: 73
End: 2023-11-21
Payer: MEDICARE

## 2023-11-21 ENCOUNTER — OUTPATIENT (OUTPATIENT)
Dept: OUTPATIENT SERVICES | Facility: HOSPITAL | Age: 73
LOS: 1 days | End: 2023-11-21
Payer: MEDICARE

## 2023-11-21 DIAGNOSIS — Z98.890 OTHER SPECIFIED POSTPROCEDURAL STATES: Chronic | ICD-10-CM

## 2023-11-21 DIAGNOSIS — R35.0 FREQUENCY OF MICTURITION: ICD-10-CM

## 2023-11-21 PROCEDURE — 52310 CYSTOSCOPY AND TREATMENT: CPT

## 2023-11-22 LAB
CELL MATERIAL STONE EST-MCNT: SIGNIFICANT CHANGE UP
CELL MATERIAL STONE EST-MCNT: SIGNIFICANT CHANGE UP
LABORATORY COMMENT REPORT: SIGNIFICANT CHANGE UP
LABORATORY COMMENT REPORT: SIGNIFICANT CHANGE UP
NIDUS STONE QN: SIGNIFICANT CHANGE UP
NIDUS STONE QN: SIGNIFICANT CHANGE UP
SURGICAL PATHOLOGY STUDY: SIGNIFICANT CHANGE UP
SURGICAL PATHOLOGY STUDY: SIGNIFICANT CHANGE UP

## 2023-11-27 DIAGNOSIS — N20.1 CALCULUS OF URETER: ICD-10-CM

## 2024-01-23 NOTE — ASU PREOP CHECKLIST - HEIGHT IN INCHES
Advance Care Planning   Healthcare Decision Maker:    Primary Decision Maker: JazmíngalloHawa payton [facility] - [phone]    Click here to complete Healthcare Decision Makers including selection of the Healthcare Decision Maker Relationship (ie \"Primary\").              1

## 2024-01-31 ENCOUNTER — APPOINTMENT (OUTPATIENT)
Dept: UROLOGY | Facility: CLINIC | Age: 74
End: 2024-01-31
Payer: MEDICARE

## 2024-01-31 VITALS
WEIGHT: 140 LBS | RESPIRATION RATE: 16 BRPM | DIASTOLIC BLOOD PRESSURE: 77 MMHG | SYSTOLIC BLOOD PRESSURE: 128 MMHG | HEIGHT: 65 IN | BODY MASS INDEX: 23.32 KG/M2 | OXYGEN SATURATION: 95 % | HEART RATE: 78 BPM | TEMPERATURE: 97.8 F

## 2024-01-31 PROCEDURE — 99214 OFFICE O/P EST MOD 30 MIN: CPT

## 2024-01-31 PROCEDURE — G2211 COMPLEX E/M VISIT ADD ON: CPT

## 2024-01-31 NOTE — PHYSICAL EXAM
[Normal Appearance] : normal appearance [Edema] : no peripheral edema [] : no respiratory distress [Bowel Sounds] : normal bowel sounds [Urethral Meatus] : meatus normal [Abdomen Tenderness] : non-tender [Epididymis] : the epididymides were normal [Testes Tenderness] : no tenderness of the testes [Testes Mass (___cm)] : there were no testicular masses [Prostate Tenderness] : the prostate was not tender [No Prostate Nodules] : no prostate nodules [Normal Station and Gait] : the gait and station were normal for the patient's age

## 2024-01-31 NOTE — HISTORY OF PRESENT ILLNESS
[FreeTextEntry1] : 73-year-old male who presents for follow-up. To review, he was admitted to [facility] in May 2022 with AGUS, urinary retention, bilateral hydronephrosis.  A catheter was placed in the output, creatinine 12.9 on admission.  This downtrended to 2 by discharge.  He was initiated on Flomax, finasteride, and CIC  PSA 5/2022 - 2.92 PSA 1.2023 - 3.00 Cr 10/2023 - 1.25 Was previously scheduled for TURP, but canceled.  Prostate 64 cc on ultrasound  PET CT 9/13/2023 - 5mm distal LEFT ureteral stone, moderate hydronephrosis Now s/p LEFT URS / LL 11/2023  He has no flank pain. He is cathing every other day - typically only when traveling for long periods of time. Typically for 300 cc Still on flomax / finasteride. Planning for blood work tomorrow w/ oncologist

## 2024-01-31 NOTE — ASSESSMENT
[FreeTextEntry1] : Mr Roberto is a 73 y.o. M who presents with urinary retention, with resultant bilateral hydronephrosis and AGUS. He is now performing CIC. He is on tamsulosin and Flomax.   #Retention, b/l hydronephrosis, BPH - Continue flomax BID, finasteride - Continue cathing every 1-2 days and prn - BMP to be performed tomorrow. Will have labs faxed. Last Cr 1/25 - Renal US to ensure no residual hydronephrosis - if hydronephrosis or elevated Cr would recommended increased cathing frequency - Previously discussed outlet procedures. He would prefer to avoid surgery if possible. Discussed because Cr / renal US previously demonstrated good bladder emptying / normalization of Cr, it is safe to continue medical therapy and cathing. - PSA  #Left ureteral stone s/p left URS - Renal US as above - Stone analysis: 100% calcium oxalate monohydrate - Increase hydration, add lemon juice - Offered 24 hour urine

## 2024-02-07 ENCOUNTER — OUTPATIENT (OUTPATIENT)
Dept: OUTPATIENT SERVICES | Facility: HOSPITAL | Age: 74
LOS: 1 days | End: 2024-02-07
Payer: MEDICARE

## 2024-02-07 ENCOUNTER — APPOINTMENT (OUTPATIENT)
Dept: ULTRASOUND IMAGING | Facility: CLINIC | Age: 74
End: 2024-02-07
Payer: MEDICARE

## 2024-02-07 DIAGNOSIS — Z98.890 OTHER SPECIFIED POSTPROCEDURAL STATES: Chronic | ICD-10-CM

## 2024-02-07 DIAGNOSIS — N13.30 UNSPECIFIED HYDRONEPHROSIS: ICD-10-CM

## 2024-02-07 PROCEDURE — 76775 US EXAM ABDO BACK WALL LIM: CPT

## 2024-02-07 PROCEDURE — 76775 US EXAM ABDO BACK WALL LIM: CPT | Mod: 26

## 2024-02-21 ENCOUNTER — OUTPATIENT (OUTPATIENT)
Dept: OUTPATIENT SERVICES | Facility: HOSPITAL | Age: 74
LOS: 1 days | End: 2024-02-21
Payer: MEDICARE

## 2024-02-21 ENCOUNTER — APPOINTMENT (OUTPATIENT)
Dept: CT IMAGING | Facility: CLINIC | Age: 74
End: 2024-02-21
Payer: MEDICARE

## 2024-02-21 DIAGNOSIS — N13.30 UNSPECIFIED HYDRONEPHROSIS: ICD-10-CM

## 2024-02-21 PROCEDURE — 74176 CT ABD & PELVIS W/O CONTRAST: CPT

## 2024-02-21 PROCEDURE — 74176 CT ABD & PELVIS W/O CONTRAST: CPT | Mod: 26

## 2024-03-20 ENCOUNTER — APPOINTMENT (OUTPATIENT)
Dept: UROLOGY | Facility: CLINIC | Age: 74
End: 2024-03-20

## 2024-04-03 ENCOUNTER — APPOINTMENT (OUTPATIENT)
Dept: UROLOGY | Facility: CLINIC | Age: 74
End: 2024-04-03
Payer: MEDICARE

## 2024-04-03 ENCOUNTER — OUTPATIENT (OUTPATIENT)
Dept: OUTPATIENT SERVICES | Facility: HOSPITAL | Age: 74
LOS: 1 days | End: 2024-04-03
Payer: MEDICARE

## 2024-04-03 VITALS
DIASTOLIC BLOOD PRESSURE: 76 MMHG | SYSTOLIC BLOOD PRESSURE: 133 MMHG | OXYGEN SATURATION: 96 % | TEMPERATURE: 98.7 F | HEART RATE: 60 BPM

## 2024-04-03 DIAGNOSIS — Z98.890 OTHER SPECIFIED POSTPROCEDURAL STATES: Chronic | ICD-10-CM

## 2024-04-03 DIAGNOSIS — R35.0 FREQUENCY OF MICTURITION: ICD-10-CM

## 2024-04-03 PROCEDURE — 51797 INTRAABDOMINAL PRESSURE TEST: CPT

## 2024-04-03 PROCEDURE — 51741 ELECTRO-UROFLOWMETRY FIRST: CPT

## 2024-04-03 PROCEDURE — 51784 ANAL/URINARY MUSCLE STUDY: CPT | Mod: 26

## 2024-04-03 PROCEDURE — 51784 ANAL/URINARY MUSCLE STUDY: CPT

## 2024-04-03 PROCEDURE — 51728 CYSTOMETROGRAM W/VP: CPT | Mod: 26

## 2024-04-03 PROCEDURE — 51728 CYSTOMETROGRAM W/VP: CPT

## 2024-04-05 DIAGNOSIS — R33.9 RETENTION OF URINE, UNSPECIFIED: ICD-10-CM

## 2024-04-22 RX ORDER — FINASTERIDE 5 MG/1
5 TABLET, FILM COATED ORAL DAILY
Qty: 90 | Refills: 3 | Status: ACTIVE | COMMUNITY
Start: 2022-06-28 | End: 1900-01-01

## 2024-06-06 ENCOUNTER — NON-APPOINTMENT (OUTPATIENT)
Age: 74
End: 2024-06-06

## 2024-06-07 LAB
APPEARANCE: ABNORMAL
BACTERIA: ABNORMAL /HPF
BILIRUBIN URINE: NEGATIVE
BLOOD URINE: ABNORMAL
CAST: 0 /LPF
COLOR: YELLOW
EPITHELIAL CELLS: 1 /HPF
GLUCOSE QUALITATIVE U: NEGATIVE MG/DL
KETONES URINE: NEGATIVE MG/DL
LEUKOCYTE ESTERASE URINE: ABNORMAL
MICROSCOPIC-UA: NORMAL
NITRITE URINE: NEGATIVE
PH URINE: 6
PROTEIN URINE: 100 MG/DL
RED BLOOD CELLS URINE: 109 /HPF
SPECIFIC GRAVITY URINE: 1.01
UROBILINOGEN URINE: 0.2 MG/DL
WHITE BLOOD CELLS URINE: >998 /HPF

## 2024-06-07 RX ORDER — SULFAMETHOXAZOLE AND TRIMETHOPRIM 800; 160 MG/1; MG/1
800-160 TABLET ORAL
Qty: 10 | Refills: 0 | Status: ACTIVE | COMMUNITY
Start: 2024-06-07 | End: 1900-01-01

## 2024-06-10 LAB — BACTERIA UR CULT: NORMAL

## 2024-06-12 ENCOUNTER — APPOINTMENT (OUTPATIENT)
Dept: UROLOGY | Facility: CLINIC | Age: 74
End: 2024-06-12
Payer: MEDICARE

## 2024-06-12 VITALS
BODY MASS INDEX: 23.32 KG/M2 | OXYGEN SATURATION: 97 % | WEIGHT: 140 LBS | DIASTOLIC BLOOD PRESSURE: 65 MMHG | RESPIRATION RATE: 16 BRPM | SYSTOLIC BLOOD PRESSURE: 101 MMHG | TEMPERATURE: 98.5 F | HEART RATE: 90 BPM | HEIGHT: 65 IN

## 2024-06-12 DIAGNOSIS — N20.1 CALCULUS OF URETER: ICD-10-CM

## 2024-06-12 DIAGNOSIS — R31.0 GROSS HEMATURIA: ICD-10-CM

## 2024-06-12 PROCEDURE — 99214 OFFICE O/P EST MOD 30 MIN: CPT

## 2024-06-12 RX ORDER — TAMSULOSIN HYDROCHLORIDE 0.4 MG/1
0.4 CAPSULE ORAL
Qty: 180 | Refills: 3 | Status: ACTIVE | COMMUNITY
Start: 1900-01-01 | End: 1900-01-01

## 2024-06-12 RX ORDER — AMOXICILLIN AND CLAVULANATE POTASSIUM 875; 125 MG/1; MG/1
875-125 TABLET, COATED ORAL
Qty: 10 | Refills: 0 | Status: ACTIVE | COMMUNITY
Start: 2024-06-12 | End: 1900-01-01

## 2024-06-12 NOTE — ASSESSMENT
[FreeTextEntry1] : Mr Felisa is a 73 y.o. M who presents with urinary retention, with resultant bilateral hydronephrosis and AGUS. He is now performing CIC.  #Retention, b/l hydronephrosis, BPH - Continue cathing QID - Last CT with left hydro - UDS without reflux - Continue finasteride, recommended restarting Flomax - UDS w/ BLANKENSHIP. -Discussed with patient history strongly consider outlet procedure - remeasured prostate with most recent CT using ellipsoid calculator - 72 cc.  Discussed TURP, HoLEP, aqua ablation.  He will think about his options but is hesitant to undergo surgery  #Hematuria - s/p cystoscopy in OR 11/2023 - no tumors - Suspect from cathing / UTI - Repeat UCx - Augmentin empirically  #Left ureteral stone s/p left URS - Stone analysis: 100% calcium oxalate monohydrate - Increase hydration, add lemon juice - Offered 24 hour urine - CT 2/2024 without stones  TTM 3-4 weeks

## 2024-06-12 NOTE — HISTORY OF PRESENT ILLNESS
[FreeTextEntry1] : 73-year-old male who presents for follow-up. To review, he was admitted to [facility] in May 2022 with AGUS, urinary retention, bilateral hydronephrosis.  A catheter was placed in the output, creatinine 12.9 on admission.  This downtrended to 2 by discharge.  He was initiated on Flomax, finasteride, and CIC  #BPH, elevated PSA, hematuria PSA 5/2022 - 2.92 PSA 1/2023 - 3.00 PSA 2/2024 - 4.740 Cr 10/2023 - 1.25 Was previously scheduled for TURP, but canceled.  Prostate 64 cc on ultrasound UDS 4/2024 - BLANKENSHIP, no VUR He is cathing  4 times / day. Typically 300cc Still on finasteride - stopped flomax Has multiple myeloma - s/p treatments  Experiencing intermittent gross hematuria  #Stones PET CT 9/13/2023 - 5mm distal LEFT ureteral stone, moderate hydronephrosis Now s/p LEFT URS / LL 11/2023 He has no flank pain.  CT 2/2024 - no residual stones

## 2024-06-12 NOTE — PHYSICAL EXAM
[Well Groomed] : well groomed [Edema] : no peripheral edema [] : no respiratory distress [Bowel Sounds] : normal bowel sounds [Abdomen Tenderness] : non-tender [Normal Station and Gait] : the gait and station were normal for the patient's age

## 2024-06-14 ENCOUNTER — INPATIENT (INPATIENT)
Facility: HOSPITAL | Age: 74
LOS: 7 days | Discharge: HOME HEALTH | DRG: 696 | End: 2024-06-22
Attending: INTERNAL MEDICINE | Admitting: INTERNAL MEDICINE
Payer: MEDICARE

## 2024-06-14 VITALS
SYSTOLIC BLOOD PRESSURE: 112 MMHG | OXYGEN SATURATION: 97 % | WEIGHT: 139.99 LBS | RESPIRATION RATE: 18 BRPM | HEART RATE: 110 BPM | HEIGHT: 64 IN | DIASTOLIC BLOOD PRESSURE: 59 MMHG | TEMPERATURE: 98 F

## 2024-06-14 DIAGNOSIS — N39.0 URINARY TRACT INFECTION, SITE NOT SPECIFIED: ICD-10-CM

## 2024-06-14 DIAGNOSIS — Z98.890 OTHER SPECIFIED POSTPROCEDURAL STATES: Chronic | ICD-10-CM

## 2024-06-14 LAB
ALBUMIN SERPL ELPH-MCNC: 3.8 G/DL — SIGNIFICANT CHANGE UP (ref 3.3–5)
ALP SERPL-CCNC: 59 U/L — SIGNIFICANT CHANGE UP (ref 40–120)
ALT FLD-CCNC: 14 U/L — SIGNIFICANT CHANGE UP (ref 10–45)
ANION GAP SERPL CALC-SCNC: 15 MMOL/L — SIGNIFICANT CHANGE UP (ref 5–17)
APPEARANCE UR: CLEAR — SIGNIFICANT CHANGE UP
APTT BLD: 29.9 SEC — SIGNIFICANT CHANGE UP (ref 24.5–35.6)
AST SERPL-CCNC: 11 U/L — SIGNIFICANT CHANGE UP (ref 10–40)
BACTERIA # UR AUTO: NEGATIVE /HPF — SIGNIFICANT CHANGE UP
BASE EXCESS BLDV CALC-SCNC: -5.8 MMOL/L — LOW (ref -2–3)
BASOPHILS # BLD AUTO: 0.04 K/UL — SIGNIFICANT CHANGE UP (ref 0–0.2)
BASOPHILS NFR BLD AUTO: 0.9 % — SIGNIFICANT CHANGE UP (ref 0–2)
BILIRUB SERPL-MCNC: 0.4 MG/DL — SIGNIFICANT CHANGE UP (ref 0.2–1.2)
BILIRUB UR-MCNC: NEGATIVE — SIGNIFICANT CHANGE UP
BUN SERPL-MCNC: 15 MG/DL — SIGNIFICANT CHANGE UP (ref 7–23)
C DIFF GDH STL QL: NEGATIVE — SIGNIFICANT CHANGE UP
C DIFF GDH STL QL: SIGNIFICANT CHANGE UP
CA-I SERPL-SCNC: 1 MMOL/L — LOW (ref 1.15–1.33)
CALCIUM SERPL-MCNC: 7.6 MG/DL — LOW (ref 8.4–10.5)
CAST: 0 /LPF — SIGNIFICANT CHANGE UP (ref 0–4)
CHLORIDE BLDV-SCNC: 105 MMOL/L — SIGNIFICANT CHANGE UP (ref 96–108)
CHLORIDE SERPL-SCNC: 105 MMOL/L — SIGNIFICANT CHANGE UP (ref 96–108)
CO2 BLDV-SCNC: 18 MMOL/L — LOW (ref 22–26)
CO2 SERPL-SCNC: 16 MMOL/L — LOW (ref 22–31)
COLOR SPEC: ABNORMAL
CREAT SERPL-MCNC: 1.64 MG/DL — HIGH (ref 0.5–1.3)
DIFF PNL FLD: ABNORMAL
EGFR: 43 ML/MIN/1.73M2 — LOW
EGFR: 43 ML/MIN/1.73M2 — LOW
EOSINOPHIL # BLD AUTO: 0.63 K/UL — HIGH (ref 0–0.5)
EOSINOPHIL NFR BLD AUTO: 14.3 % — HIGH (ref 0–6)
FLUAV AG NPH QL: SIGNIFICANT CHANGE UP
FLUBV AG NPH QL: SIGNIFICANT CHANGE UP
GAS PNL BLDV: 133 MMOL/L — LOW (ref 136–145)
GAS PNL BLDV: SIGNIFICANT CHANGE UP
GI PCR PANEL: SIGNIFICANT CHANGE UP
GLUCOSE BLDV-MCNC: 91 MG/DL — SIGNIFICANT CHANGE UP (ref 70–99)
GLUCOSE SERPL-MCNC: 93 MG/DL — SIGNIFICANT CHANGE UP (ref 70–99)
GLUCOSE UR QL: NEGATIVE MG/DL — SIGNIFICANT CHANGE UP
HCO3 BLDV-SCNC: 18 MMOL/L — LOW (ref 22–29)
HCT VFR BLD CALC: 29 % — LOW (ref 39–50)
HCT VFR BLDA CALC: 32 % — LOW (ref 39–51)
HGB BLD CALC-MCNC: 10.6 G/DL — LOW (ref 12.6–17.4)
HGB BLD-MCNC: 9.5 G/DL — LOW (ref 13–17)
IMM GRANULOCYTES NFR BLD AUTO: 0.7 % — SIGNIFICANT CHANGE UP (ref 0–0.9)
INR BLD: 1.13 RATIO — SIGNIFICANT CHANGE UP (ref 0.85–1.18)
KETONES UR-MCNC: 15 MG/DL
LACTATE BLDV-MCNC: 1.6 MMOL/L — SIGNIFICANT CHANGE UP (ref 0.5–2)
LEUKOCYTE ESTERASE UR-ACNC: ABNORMAL
LYMPHOCYTES # BLD AUTO: 1.29 K/UL — SIGNIFICANT CHANGE UP (ref 1–3.3)
LYMPHOCYTES # BLD AUTO: 29.2 % — SIGNIFICANT CHANGE UP (ref 13–44)
MAGNESIUM SERPL-MCNC: 1.5 MG/DL — LOW (ref 1.6–2.6)
MCHC RBC-ENTMCNC: 29.1 PG — SIGNIFICANT CHANGE UP (ref 27–34)
MCHC RBC-ENTMCNC: 32.8 GM/DL — SIGNIFICANT CHANGE UP (ref 32–36)
MCV RBC AUTO: 88.7 FL — SIGNIFICANT CHANGE UP (ref 80–100)
MONOCYTES # BLD AUTO: 0.47 K/UL — SIGNIFICANT CHANGE UP (ref 0–0.9)
MONOCYTES NFR BLD AUTO: 10.6 % — SIGNIFICANT CHANGE UP (ref 2–14)
NEUTROPHILS # BLD AUTO: 1.96 K/UL — SIGNIFICANT CHANGE UP (ref 1.8–7.4)
NEUTROPHILS NFR BLD AUTO: 44.3 % — SIGNIFICANT CHANGE UP (ref 43–77)
NITRITE UR-MCNC: NEGATIVE — SIGNIFICANT CHANGE UP
NRBC # BLD: 0 /100 WBCS — SIGNIFICANT CHANGE UP (ref 0–0)
NRBC BLD-RTO: 0 /100 WBCS — SIGNIFICANT CHANGE UP (ref 0–0)
PCO2 BLDV: 27 MMHG — LOW (ref 42–55)
PH BLDV: 7.42 — SIGNIFICANT CHANGE UP (ref 7.32–7.43)
PH UR: 6.5 — SIGNIFICANT CHANGE UP (ref 5–8)
PLATELET # BLD AUTO: 267 K/UL — SIGNIFICANT CHANGE UP (ref 150–400)
PO2 BLDV: 15 MMHG — LOW (ref 25–45)
POTASSIUM BLDV-SCNC: 4 MMOL/L — SIGNIFICANT CHANGE UP (ref 3.5–5.1)
POTASSIUM SERPL-MCNC: 4.1 MMOL/L — SIGNIFICANT CHANGE UP (ref 3.5–5.3)
POTASSIUM SERPL-SCNC: 4.1 MMOL/L — SIGNIFICANT CHANGE UP (ref 3.5–5.3)
PROT SERPL-MCNC: 6.5 G/DL — SIGNIFICANT CHANGE UP (ref 6–8.3)
PROT UR-MCNC: 100 MG/DL
PROTHROM AB SERPL-ACNC: 11.8 SEC — SIGNIFICANT CHANGE UP (ref 9.5–13)
RBC # BLD: 3.27 M/UL — LOW (ref 4.2–5.8)
RBC # FLD: 14.9 % — HIGH (ref 10.3–14.5)
RBC CASTS # UR COMP ASSIST: 764 /HPF — HIGH (ref 0–4)
RSV RNA NPH QL NAA+NON-PROBE: SIGNIFICANT CHANGE UP
SAO2 % BLDV: 25.6 % — LOW (ref 67–88)
SARS-COV-2 RNA SPEC QL NAA+PROBE: SIGNIFICANT CHANGE UP
SODIUM SERPL-SCNC: 136 MMOL/L — SIGNIFICANT CHANGE UP (ref 135–145)
SP GR SPEC: 1.01 — SIGNIFICANT CHANGE UP (ref 1–1.03)
SQUAMOUS # UR AUTO: 1 /HPF — SIGNIFICANT CHANGE UP (ref 0–5)
TROPONIN T, HIGH SENSITIVITY RESULT: 29 NG/L — SIGNIFICANT CHANGE UP (ref 0–51)
UROBILINOGEN FLD QL: 0.2 MG/DL — SIGNIFICANT CHANGE UP (ref 0.2–1)
WBC # BLD: 4.42 K/UL — SIGNIFICANT CHANGE UP (ref 3.8–10.5)
WBC # FLD AUTO: 4.42 K/UL — SIGNIFICANT CHANGE UP (ref 3.8–10.5)
WBC UR QL: 4 /HPF — SIGNIFICANT CHANGE UP (ref 0–5)

## 2024-06-14 PROCEDURE — 74176 CT ABD & PELVIS W/O CONTRAST: CPT | Mod: 26,MC

## 2024-06-14 PROCEDURE — 99285 EMERGENCY DEPT VISIT HI MDM: CPT

## 2024-06-14 PROCEDURE — 86077 PHYS BLOOD BANK SERV XMATCH: CPT

## 2024-06-14 RX ORDER — METOPROLOL SUCCINATE 50 MG/1
25 TABLET, EXTENDED RELEASE ORAL DAILY
Refills: 0 | Status: DISCONTINUED | OUTPATIENT
Start: 2024-06-14 | End: 2024-06-16

## 2024-06-14 RX ORDER — ASPIRIN 325 MG
81 TABLET ORAL DAILY
Refills: 0 | Status: DISCONTINUED | OUTPATIENT
Start: 2024-06-14 | End: 2024-06-22

## 2024-06-14 RX ORDER — TAMSULOSIN HYDROCHLORIDE 0.4 MG/1
0.4 CAPSULE ORAL AT BEDTIME
Refills: 0 | Status: DISCONTINUED | OUTPATIENT
Start: 2024-06-14 | End: 2024-06-22

## 2024-06-14 RX ORDER — OXYCODONE HYDROCHLORIDE 30 MG/1
10 TABLET ORAL
Refills: 0 | Status: DISCONTINUED | OUTPATIENT
Start: 2024-06-14 | End: 2024-06-17

## 2024-06-14 RX ORDER — SENNA 187 MG
2 TABLET ORAL AT BEDTIME
Refills: 0 | Status: DISCONTINUED | OUTPATIENT
Start: 2024-06-14 | End: 2024-06-18

## 2024-06-14 RX ORDER — ASPIRIN 325 MG
1 TABLET ORAL
Refills: 0 | DISCHARGE

## 2024-06-14 RX ORDER — MEROPENEM 1 G/30ML
1000 INJECTION INTRAVENOUS ONCE
Refills: 0 | Status: COMPLETED | OUTPATIENT
Start: 2024-06-14 | End: 2024-06-14

## 2024-06-14 RX ORDER — FENTANYL CITRATE 50 UG/ML
1 INJECTION INTRAVENOUS
Refills: 0 | DISCHARGE

## 2024-06-14 RX ORDER — ATORVASTATIN CALCIUM 80 MG/1
20 TABLET, FILM COATED ORAL AT BEDTIME
Refills: 0 | Status: DISCONTINUED | OUTPATIENT
Start: 2024-06-14 | End: 2024-06-20

## 2024-06-14 RX ORDER — MAGNESIUM SULFATE 500 MG/ML
1 SYRINGE (ML) INJECTION ONCE
Refills: 0 | Status: COMPLETED | OUTPATIENT
Start: 2024-06-14 | End: 2024-06-14

## 2024-06-14 RX ORDER — FINASTERIDE 1 MG/1
5 TABLET, FILM COATED ORAL DAILY
Refills: 0 | Status: DISCONTINUED | OUTPATIENT
Start: 2024-06-14 | End: 2024-06-22

## 2024-06-14 RX ORDER — OXYCODONE HYDROCHLORIDE 30 MG/1
5 TABLET ORAL THREE TIMES A DAY
Refills: 0 | Status: DISCONTINUED | OUTPATIENT
Start: 2024-06-14 | End: 2024-06-17

## 2024-06-14 RX ORDER — CEFTRIAXONE 500 MG/1
1000 INJECTION, POWDER, FOR SOLUTION INTRAMUSCULAR; INTRAVENOUS EVERY 24 HOURS
Refills: 0 | Status: DISCONTINUED | OUTPATIENT
Start: 2024-06-14 | End: 2024-06-18

## 2024-06-14 RX ORDER — ALLOPURINOL 300 MG
0 TABLET ORAL
Refills: 0 | DISCHARGE

## 2024-06-14 RX ADMIN — Medication 70 MILLILITER(S): at 13:35

## 2024-06-14 RX ADMIN — OXYCODONE HYDROCHLORIDE 10 MILLIGRAM(S): 30 TABLET ORAL at 20:45

## 2024-06-14 RX ADMIN — MEROPENEM 100 MILLIGRAM(S): 1 INJECTION INTRAVENOUS at 10:02

## 2024-06-14 RX ADMIN — TAMSULOSIN HYDROCHLORIDE 0.4 MILLIGRAM(S): 0.4 CAPSULE ORAL at 21:07

## 2024-06-14 RX ADMIN — Medication 100 MILLIGRAM(S): at 17:57

## 2024-06-14 RX ADMIN — ATORVASTATIN CALCIUM 20 MILLIGRAM(S): 80 TABLET, FILM COATED ORAL at 21:07

## 2024-06-14 RX ADMIN — OXYCODONE HYDROCHLORIDE 10 MILLIGRAM(S): 30 TABLET ORAL at 21:30

## 2024-06-14 RX ADMIN — Medication 81 MILLIGRAM(S): at 17:57

## 2024-06-14 RX ADMIN — Medication 1000 MILLILITER(S): at 10:02

## 2024-06-14 RX ADMIN — FINASTERIDE 5 MILLIGRAM(S): 1 TABLET, FILM COATED ORAL at 17:57

## 2024-06-14 RX ADMIN — CEFTRIAXONE 100 MILLIGRAM(S): 500 INJECTION, POWDER, FOR SOLUTION INTRAMUSCULAR; INTRAVENOUS at 13:35

## 2024-06-14 RX ADMIN — Medication 100 GRAM(S): at 11:38

## 2024-06-15 LAB
CULTURE RESULTS: NO GROWTH — SIGNIFICANT CHANGE UP
HCT VFR BLD CALC: 24.7 % — LOW (ref 39–50)
HGB BLD-MCNC: 8.3 G/DL — LOW (ref 13–17)
MCHC RBC-ENTMCNC: 29.9 PG — SIGNIFICANT CHANGE UP (ref 27–34)
MCHC RBC-ENTMCNC: 33.6 GM/DL — SIGNIFICANT CHANGE UP (ref 32–36)
MCV RBC AUTO: 88.8 FL — SIGNIFICANT CHANGE UP (ref 80–100)
NRBC # BLD: 0 /100 WBCS — SIGNIFICANT CHANGE UP (ref 0–0)
NRBC BLD-RTO: 0 /100 WBCS — SIGNIFICANT CHANGE UP (ref 0–0)
PLATELET # BLD AUTO: 231 K/UL — SIGNIFICANT CHANGE UP (ref 150–400)
RBC # BLD: 2.78 M/UL — LOW (ref 4.2–5.8)
RBC # FLD: 14.8 % — HIGH (ref 10.3–14.5)
SPECIMEN SOURCE: SIGNIFICANT CHANGE UP
WBC # BLD: 4.19 K/UL — SIGNIFICANT CHANGE UP (ref 3.8–10.5)
WBC # FLD AUTO: 4.19 K/UL — SIGNIFICANT CHANGE UP (ref 3.8–10.5)

## 2024-06-15 PROCEDURE — 93010 ELECTROCARDIOGRAM REPORT: CPT

## 2024-06-15 RX ORDER — ACETAMINOPHEN 500 MG/5ML
650 LIQUID (ML) ORAL EVERY 6 HOURS
Refills: 0 | Status: DISCONTINUED | OUTPATIENT
Start: 2024-06-15 | End: 2024-06-22

## 2024-06-15 RX ORDER — HYDROMORPHONE/SOD CHLOR,ISO/PF 2 MG/10 ML
0.5 SYRINGE (ML) INJECTION EVERY 6 HOURS
Refills: 0 | Status: DISCONTINUED | OUTPATIENT
Start: 2024-06-15 | End: 2024-06-17

## 2024-06-15 RX ADMIN — Medication 81 MILLIGRAM(S): at 12:43

## 2024-06-15 RX ADMIN — OXYCODONE HYDROCHLORIDE 5 MILLIGRAM(S): 30 TABLET ORAL at 12:17

## 2024-06-15 RX ADMIN — ATORVASTATIN CALCIUM 20 MILLIGRAM(S): 80 TABLET, FILM COATED ORAL at 21:10

## 2024-06-15 RX ADMIN — Medication 70 MILLILITER(S): at 17:23

## 2024-06-15 RX ADMIN — Medication 40 MILLIGRAM(S): at 05:23

## 2024-06-15 RX ADMIN — OXYCODONE HYDROCHLORIDE 5 MILLIGRAM(S): 30 TABLET ORAL at 12:50

## 2024-06-15 RX ADMIN — METOPROLOL SUCCINATE 25 MILLIGRAM(S): 50 TABLET, EXTENDED RELEASE ORAL at 05:23

## 2024-06-15 RX ADMIN — Medication 0.5 MILLIGRAM(S): at 16:15

## 2024-06-15 RX ADMIN — Medication 100 MILLIGRAM(S): at 12:43

## 2024-06-15 RX ADMIN — OXYCODONE HYDROCHLORIDE 10 MILLIGRAM(S): 30 TABLET ORAL at 19:30

## 2024-06-15 RX ADMIN — Medication 650 MILLIGRAM(S): at 20:36

## 2024-06-15 RX ADMIN — FINASTERIDE 5 MILLIGRAM(S): 1 TABLET, FILM COATED ORAL at 12:43

## 2024-06-15 RX ADMIN — OXYCODONE HYDROCHLORIDE 10 MILLIGRAM(S): 30 TABLET ORAL at 08:16

## 2024-06-15 RX ADMIN — Medication 650 MILLIGRAM(S): at 21:36

## 2024-06-15 RX ADMIN — TAMSULOSIN HYDROCHLORIDE 0.4 MILLIGRAM(S): 0.4 CAPSULE ORAL at 21:10

## 2024-06-15 RX ADMIN — Medication 0.5 MILLIGRAM(S): at 15:59

## 2024-06-15 RX ADMIN — Medication 2 TABLET(S): at 21:10

## 2024-06-15 RX ADMIN — OXYCODONE HYDROCHLORIDE 10 MILLIGRAM(S): 30 TABLET ORAL at 18:47

## 2024-06-15 RX ADMIN — CEFTRIAXONE 100 MILLIGRAM(S): 500 INJECTION, POWDER, FOR SOLUTION INTRAMUSCULAR; INTRAVENOUS at 12:43

## 2024-06-15 RX ADMIN — OXYCODONE HYDROCHLORIDE 10 MILLIGRAM(S): 30 TABLET ORAL at 09:00

## 2024-06-16 LAB
ANION GAP SERPL CALC-SCNC: 11 MMOL/L — SIGNIFICANT CHANGE UP (ref 5–17)
ANION GAP SERPL CALC-SCNC: 17 MMOL/L — SIGNIFICANT CHANGE UP (ref 5–17)
BASOPHILS # BLD AUTO: 0 K/UL — SIGNIFICANT CHANGE UP (ref 0–0.2)
BASOPHILS NFR BLD AUTO: 0 % — SIGNIFICANT CHANGE UP (ref 0–2)
BUN SERPL-MCNC: 8 MG/DL — SIGNIFICANT CHANGE UP (ref 7–23)
BUN SERPL-MCNC: 9 MG/DL — SIGNIFICANT CHANGE UP (ref 7–23)
CALCIUM SERPL-MCNC: 6.6 MG/DL — LOW (ref 8.4–10.5)
CALCIUM SERPL-MCNC: 7 MG/DL — LOW (ref 8.4–10.5)
CHLORIDE SERPL-SCNC: 101 MMOL/L — SIGNIFICANT CHANGE UP (ref 96–108)
CHLORIDE SERPL-SCNC: 105 MMOL/L — SIGNIFICANT CHANGE UP (ref 96–108)
CO2 SERPL-SCNC: 15 MMOL/L — LOW (ref 22–31)
CO2 SERPL-SCNC: 19 MMOL/L — LOW (ref 22–31)
CREAT SERPL-MCNC: 1.08 MG/DL — SIGNIFICANT CHANGE UP (ref 0.5–1.3)
CREAT SERPL-MCNC: 1.14 MG/DL — SIGNIFICANT CHANGE UP (ref 0.5–1.3)
EGFR: 67 ML/MIN/1.73M2 — SIGNIFICANT CHANGE UP
EGFR: 67 ML/MIN/1.73M2 — SIGNIFICANT CHANGE UP
EGFR: 72 ML/MIN/1.73M2 — SIGNIFICANT CHANGE UP
EGFR: 72 ML/MIN/1.73M2 — SIGNIFICANT CHANGE UP
EOSINOPHIL # BLD AUTO: 0 K/UL — SIGNIFICANT CHANGE UP (ref 0–0.5)
EOSINOPHIL NFR BLD AUTO: 0 % — SIGNIFICANT CHANGE UP (ref 0–6)
GLUCOSE SERPL-MCNC: 101 MG/DL — HIGH (ref 70–99)
GLUCOSE SERPL-MCNC: 99 MG/DL — SIGNIFICANT CHANGE UP (ref 70–99)
HCT VFR BLD CALC: 25.9 % — LOW (ref 39–50)
HGB BLD-MCNC: 8.2 G/DL — LOW (ref 13–17)
LYMPHOCYTES # BLD AUTO: 0.99 K/UL — LOW (ref 1–3.3)
LYMPHOCYTES # BLD AUTO: 16.5 % — SIGNIFICANT CHANGE UP (ref 13–44)
MAGNESIUM SERPL-MCNC: 1.3 MG/DL — LOW (ref 1.6–2.6)
MANUAL SMEAR VERIFICATION: SIGNIFICANT CHANGE UP
MCHC RBC-ENTMCNC: 28.5 PG — SIGNIFICANT CHANGE UP (ref 27–34)
MCHC RBC-ENTMCNC: 31.7 GM/DL — LOW (ref 32–36)
MCV RBC AUTO: 89.9 FL — SIGNIFICANT CHANGE UP (ref 80–100)
MONOCYTES # BLD AUTO: 0.89 K/UL — SIGNIFICANT CHANGE UP (ref 0–0.9)
MONOCYTES NFR BLD AUTO: 14.8 % — HIGH (ref 2–14)
NEUTROPHILS # BLD AUTO: 4.13 K/UL — SIGNIFICANT CHANGE UP (ref 1.8–7.4)
NEUTROPHILS NFR BLD AUTO: 66.1 % — SIGNIFICANT CHANGE UP (ref 43–77)
NEUTS BAND # BLD: 2.6 % — SIGNIFICANT CHANGE UP (ref 0–8)
NEUTS BAND NFR BLD: 2.6 % — SIGNIFICANT CHANGE UP (ref 0–8)
PHOSPHATE SERPL-MCNC: 1.3 MG/DL — LOW (ref 2.5–4.5)
PLAT MORPH BLD: NORMAL — SIGNIFICANT CHANGE UP
PLATELET # BLD AUTO: 268 K/UL — SIGNIFICANT CHANGE UP (ref 150–400)
POTASSIUM SERPL-MCNC: 3.3 MMOL/L — LOW (ref 3.5–5.3)
POTASSIUM SERPL-MCNC: 3.9 MMOL/L — SIGNIFICANT CHANGE UP (ref 3.5–5.3)
POTASSIUM SERPL-SCNC: 3.3 MMOL/L — LOW (ref 3.5–5.3)
POTASSIUM SERPL-SCNC: 3.9 MMOL/L — SIGNIFICANT CHANGE UP (ref 3.5–5.3)
RBC # BLD: 2.88 M/UL — LOW (ref 4.2–5.8)
RBC # FLD: 14.6 % — HIGH (ref 10.3–14.5)
RBC BLD AUTO: SIGNIFICANT CHANGE UP
SODIUM SERPL-SCNC: 133 MMOL/L — LOW (ref 135–145)
SODIUM SERPL-SCNC: 135 MMOL/L — SIGNIFICANT CHANGE UP (ref 135–145)
WBC # BLD: 6.01 K/UL — SIGNIFICANT CHANGE UP (ref 3.8–10.5)
WBC # FLD AUTO: 6.01 K/UL — SIGNIFICANT CHANGE UP (ref 3.8–10.5)

## 2024-06-16 PROCEDURE — 99222 1ST HOSP IP/OBS MODERATE 55: CPT

## 2024-06-16 RX ORDER — HEPARIN SODIUM 1000 [USP'U]/ML
5000 INJECTION INTRAVENOUS; SUBCUTANEOUS EVERY 12 HOURS
Refills: 0 | Status: DISCONTINUED | OUTPATIENT
Start: 2024-06-16 | End: 2024-06-22

## 2024-06-16 RX ORDER — METOPROLOL SUCCINATE 50 MG/1
25 TABLET, EXTENDED RELEASE ORAL
Refills: 0 | Status: DISCONTINUED | OUTPATIENT
Start: 2024-06-16 | End: 2024-06-22

## 2024-06-16 RX ORDER — MAGNESIUM SULFATE 500 MG/ML
1 SYRINGE (ML) INJECTION ONCE
Refills: 0 | Status: COMPLETED | OUTPATIENT
Start: 2024-06-16 | End: 2024-06-16

## 2024-06-16 RX ORDER — CALCIUM GLUCONATE 20 MG/ML
1 INJECTION, SOLUTION INTRAVENOUS ONCE
Refills: 0 | Status: COMPLETED | OUTPATIENT
Start: 2024-06-16 | End: 2024-06-16

## 2024-06-16 RX ORDER — POTASSIUM PHOSPHATE, MONOBASIC POTASSIUM PHOSPHATE, DIBASIC INJECTION, 236; 224 MG/ML; MG/ML
15 SOLUTION, CONCENTRATE INTRAVENOUS ONCE
Refills: 0 | Status: COMPLETED | OUTPATIENT
Start: 2024-06-16 | End: 2024-06-16

## 2024-06-16 RX ADMIN — OXYCODONE HYDROCHLORIDE 10 MILLIGRAM(S): 30 TABLET ORAL at 13:25

## 2024-06-16 RX ADMIN — Medication 40 MILLIEQUIVALENT(S): at 09:25

## 2024-06-16 RX ADMIN — Medication 81 MILLIGRAM(S): at 11:53

## 2024-06-16 RX ADMIN — FINASTERIDE 5 MILLIGRAM(S): 1 TABLET, FILM COATED ORAL at 11:53

## 2024-06-16 RX ADMIN — OXYCODONE HYDROCHLORIDE 5 MILLIGRAM(S): 30 TABLET ORAL at 20:51

## 2024-06-16 RX ADMIN — Medication 100 GRAM(S): at 16:16

## 2024-06-16 RX ADMIN — Medication 70 MILLILITER(S): at 05:59

## 2024-06-16 RX ADMIN — OXYCODONE HYDROCHLORIDE 10 MILLIGRAM(S): 30 TABLET ORAL at 14:25

## 2024-06-16 RX ADMIN — ATORVASTATIN CALCIUM 20 MILLIGRAM(S): 80 TABLET, FILM COATED ORAL at 22:36

## 2024-06-16 RX ADMIN — Medication 1 APPLICATION(S): at 16:17

## 2024-06-16 RX ADMIN — POTASSIUM PHOSPHATE, MONOBASIC POTASSIUM PHOSPHATE, DIBASIC INJECTION, 62.5 MILLIMOLE(S): 236; 224 SOLUTION, CONCENTRATE INTRAVENOUS at 17:16

## 2024-06-16 RX ADMIN — Medication 40 MILLIGRAM(S): at 05:44

## 2024-06-16 RX ADMIN — OXYCODONE HYDROCHLORIDE 10 MILLIGRAM(S): 30 TABLET ORAL at 05:44

## 2024-06-16 RX ADMIN — OXYCODONE HYDROCHLORIDE 5 MILLIGRAM(S): 30 TABLET ORAL at 22:00

## 2024-06-16 RX ADMIN — METOPROLOL SUCCINATE 25 MILLIGRAM(S): 50 TABLET, EXTENDED RELEASE ORAL at 17:11

## 2024-06-16 RX ADMIN — HEPARIN SODIUM 5000 UNIT(S): 1000 INJECTION INTRAVENOUS; SUBCUTANEOUS at 17:12

## 2024-06-16 RX ADMIN — CEFTRIAXONE 100 MILLIGRAM(S): 500 INJECTION, POWDER, FOR SOLUTION INTRAMUSCULAR; INTRAVENOUS at 13:23

## 2024-06-16 RX ADMIN — METOPROLOL SUCCINATE 25 MILLIGRAM(S): 50 TABLET, EXTENDED RELEASE ORAL at 05:45

## 2024-06-16 RX ADMIN — Medication 70 MILLILITER(S): at 22:37

## 2024-06-16 RX ADMIN — Medication 100 MILLIGRAM(S): at 11:53

## 2024-06-16 RX ADMIN — TAMSULOSIN HYDROCHLORIDE 0.4 MILLIGRAM(S): 0.4 CAPSULE ORAL at 22:36

## 2024-06-16 RX ADMIN — OXYCODONE HYDROCHLORIDE 10 MILLIGRAM(S): 30 TABLET ORAL at 06:44

## 2024-06-16 RX ADMIN — CALCIUM GLUCONATE 100 GRAM(S): 20 INJECTION, SOLUTION INTRAVENOUS at 09:47

## 2024-06-17 LAB
ADD ON TEST-SPECIMEN IN LAB: SIGNIFICANT CHANGE UP
ANION GAP SERPL CALC-SCNC: 16 MMOL/L — SIGNIFICANT CHANGE UP (ref 5–17)
APTT BLD: 30 SEC — SIGNIFICANT CHANGE UP (ref 24.5–35.6)
BACTERIA UR CULT: ABNORMAL
BUN SERPL-MCNC: 8 MG/DL — SIGNIFICANT CHANGE UP (ref 7–23)
CALCIUM SERPL-MCNC: 6.6 MG/DL — LOW (ref 8.4–10.5)
CHLORIDE SERPL-SCNC: 105 MMOL/L — SIGNIFICANT CHANGE UP (ref 96–108)
CO2 SERPL-SCNC: 14 MMOL/L — LOW (ref 22–31)
CREAT SERPL-MCNC: 1.13 MG/DL — SIGNIFICANT CHANGE UP (ref 0.5–1.3)
EGFR: 68 ML/MIN/1.73M2 — SIGNIFICANT CHANGE UP
EGFR: 68 ML/MIN/1.73M2 — SIGNIFICANT CHANGE UP
FERRITIN SERPL-MCNC: 617 NG/ML — HIGH (ref 30–400)
FOLATE SERPL-MCNC: 8.2 NG/ML — SIGNIFICANT CHANGE UP
GLUCOSE SERPL-MCNC: 102 MG/DL — HIGH (ref 70–99)
HAPTOGLOB SERPL-MCNC: 403 MG/DL — HIGH (ref 34–200)
HCT VFR BLD CALC: 22.5 % — LOW (ref 39–50)
HCT VFR BLD CALC: 24.5 % — LOW (ref 39–50)
HGB BLD-MCNC: 7.6 G/DL — LOW (ref 13–17)
HGB BLD-MCNC: 7.9 G/DL — LOW (ref 13–17)
INR BLD: 1.47 RATIO — HIGH (ref 0.85–1.18)
IRON SATN MFR SERPL: 10 UG/DL — LOW (ref 45–165)
IRON SATN MFR SERPL: 6 % — LOW (ref 16–55)
MAGNESIUM SERPL-MCNC: 1.4 MG/DL — LOW (ref 1.6–2.6)
MCHC RBC-ENTMCNC: 28.5 PG — SIGNIFICANT CHANGE UP (ref 27–34)
MCHC RBC-ENTMCNC: 29 PG — SIGNIFICANT CHANGE UP (ref 27–34)
MCHC RBC-ENTMCNC: 32.2 GM/DL — SIGNIFICANT CHANGE UP (ref 32–36)
MCHC RBC-ENTMCNC: 33.8 GM/DL — SIGNIFICANT CHANGE UP (ref 32–36)
MCV RBC AUTO: 85.9 FL — SIGNIFICANT CHANGE UP (ref 80–100)
MCV RBC AUTO: 88.4 FL — SIGNIFICANT CHANGE UP (ref 80–100)
MRSA PCR RESULT.: SIGNIFICANT CHANGE UP
NRBC # BLD: 0 /100 WBCS — SIGNIFICANT CHANGE UP (ref 0–0)
NRBC # BLD: 0 /100 WBCS — SIGNIFICANT CHANGE UP (ref 0–0)
NRBC BLD-RTO: 0 /100 WBCS — SIGNIFICANT CHANGE UP (ref 0–0)
NRBC BLD-RTO: 0 /100 WBCS — SIGNIFICANT CHANGE UP (ref 0–0)
PHOSPHATE SERPL-MCNC: 1.6 MG/DL — LOW (ref 2.5–4.5)
PLATELET # BLD AUTO: 271 K/UL — SIGNIFICANT CHANGE UP (ref 150–400)
PLATELET # BLD AUTO: 280 K/UL — SIGNIFICANT CHANGE UP (ref 150–400)
POTASSIUM SERPL-MCNC: 3.5 MMOL/L — SIGNIFICANT CHANGE UP (ref 3.5–5.3)
POTASSIUM SERPL-SCNC: 3.5 MMOL/L — SIGNIFICANT CHANGE UP (ref 3.5–5.3)
PROTHROM AB SERPL-ACNC: 15.3 SEC — HIGH (ref 9.5–13)
RBC # BLD: 2.62 M/UL — LOW (ref 4.2–5.8)
RBC # BLD: 2.77 M/UL — LOW (ref 4.2–5.8)
RBC # BLD: 2.77 M/UL — LOW (ref 4.2–5.8)
RBC # FLD: 14.7 % — HIGH (ref 10.3–14.5)
RBC # FLD: 14.9 % — HIGH (ref 10.3–14.5)
RETICS #: 19.1 K/UL — LOW (ref 25–125)
RETICS/RBC NFR: 0.7 % — SIGNIFICANT CHANGE UP (ref 0.5–2.5)
S AUREUS DNA NOSE QL NAA+PROBE: SIGNIFICANT CHANGE UP
SODIUM SERPL-SCNC: 135 MMOL/L — SIGNIFICANT CHANGE UP (ref 135–145)
TIBC SERPL-MCNC: 152 UG/DL — LOW (ref 220–430)
UIBC SERPL-MCNC: 142 UG/DL — SIGNIFICANT CHANGE UP (ref 110–370)
VIT B12 SERPL-MCNC: 1107 PG/ML — SIGNIFICANT CHANGE UP (ref 232–1245)
WBC # BLD: 6.65 K/UL — SIGNIFICANT CHANGE UP (ref 3.8–10.5)
WBC # BLD: 6.95 K/UL — SIGNIFICANT CHANGE UP (ref 3.8–10.5)
WBC # FLD AUTO: 6.65 K/UL — SIGNIFICANT CHANGE UP (ref 3.8–10.5)
WBC # FLD AUTO: 6.95 K/UL — SIGNIFICANT CHANGE UP (ref 3.8–10.5)

## 2024-06-17 PROCEDURE — 72192 CT PELVIS W/O DYE: CPT | Mod: 26

## 2024-06-17 PROCEDURE — 73070 X-RAY EXAM OF ELBOW: CPT | Mod: 26,RT

## 2024-06-17 PROCEDURE — 72158 MRI LUMBAR SPINE W/O & W/DYE: CPT | Mod: 26

## 2024-06-17 PROCEDURE — 76377 3D RENDER W/INTRP POSTPROCES: CPT | Mod: 26

## 2024-06-17 PROCEDURE — 73501 X-RAY EXAM HIP UNI 1 VIEW: CPT | Mod: 26,LT

## 2024-06-17 PROCEDURE — 99221 1ST HOSP IP/OBS SF/LOW 40: CPT

## 2024-06-17 RX ORDER — OXYCODONE HYDROCHLORIDE 30 MG/1
10 TABLET ORAL THREE TIMES A DAY
Refills: 0 | Status: DISCONTINUED | OUTPATIENT
Start: 2024-06-17 | End: 2024-06-17

## 2024-06-17 RX ORDER — OXYCODONE HYDROCHLORIDE 30 MG/1
10 TABLET ORAL EVERY 12 HOURS
Refills: 0 | Status: DISCONTINUED | OUTPATIENT
Start: 2024-06-17 | End: 2024-06-22

## 2024-06-17 RX ORDER — OXYCODONE HYDROCHLORIDE 30 MG/1
10 TABLET ORAL EVERY 4 HOURS
Refills: 0 | Status: DISCONTINUED | OUTPATIENT
Start: 2024-06-17 | End: 2024-06-22

## 2024-06-17 RX ORDER — CALCIUM GLUCONATE 20 MG/ML
1 INJECTION, SOLUTION INTRAVENOUS ONCE
Refills: 0 | Status: COMPLETED | OUTPATIENT
Start: 2024-06-17 | End: 2024-06-17

## 2024-06-17 RX ORDER — POTASSIUM PHOSPHATE, MONOBASIC POTASSIUM PHOSPHATE, DIBASIC INJECTION, 236; 224 MG/ML; MG/ML
15 SOLUTION, CONCENTRATE INTRAVENOUS ONCE
Refills: 0 | Status: COMPLETED | OUTPATIENT
Start: 2024-06-17 | End: 2024-06-17

## 2024-06-17 RX ORDER — MAGNESIUM SULFATE 500 MG/ML
1 SYRINGE (ML) INJECTION ONCE
Refills: 0 | Status: COMPLETED | OUTPATIENT
Start: 2024-06-17 | End: 2024-06-17

## 2024-06-17 RX ADMIN — OXYCODONE HYDROCHLORIDE 5 MILLIGRAM(S): 30 TABLET ORAL at 12:55

## 2024-06-17 RX ADMIN — OXYCODONE HYDROCHLORIDE 5 MILLIGRAM(S): 30 TABLET ORAL at 11:55

## 2024-06-17 RX ADMIN — OXYCODONE HYDROCHLORIDE 10 MILLIGRAM(S): 30 TABLET ORAL at 18:41

## 2024-06-17 RX ADMIN — OXYCODONE HYDROCHLORIDE 10 MILLIGRAM(S): 30 TABLET ORAL at 07:42

## 2024-06-17 RX ADMIN — Medication 650 MILLIGRAM(S): at 21:03

## 2024-06-17 RX ADMIN — Medication 650 MILLIGRAM(S): at 22:16

## 2024-06-17 RX ADMIN — HEPARIN SODIUM 5000 UNIT(S): 1000 INJECTION INTRAVENOUS; SUBCUTANEOUS at 05:51

## 2024-06-17 RX ADMIN — ATORVASTATIN CALCIUM 20 MILLIGRAM(S): 80 TABLET, FILM COATED ORAL at 21:03

## 2024-06-17 RX ADMIN — TAMSULOSIN HYDROCHLORIDE 0.4 MILLIGRAM(S): 0.4 CAPSULE ORAL at 21:03

## 2024-06-17 RX ADMIN — METOPROLOL SUCCINATE 25 MILLIGRAM(S): 50 TABLET, EXTENDED RELEASE ORAL at 17:13

## 2024-06-17 RX ADMIN — Medication 100 GRAM(S): at 11:48

## 2024-06-17 RX ADMIN — Medication 100 MILLIGRAM(S): at 11:55

## 2024-06-17 RX ADMIN — OXYCODONE HYDROCHLORIDE 10 MILLIGRAM(S): 30 TABLET ORAL at 05:50

## 2024-06-17 RX ADMIN — Medication 1 APPLICATION(S): at 11:57

## 2024-06-17 RX ADMIN — POTASSIUM PHOSPHATE, MONOBASIC POTASSIUM PHOSPHATE, DIBASIC INJECTION, 62.5 MILLIMOLE(S): 236; 224 SOLUTION, CONCENTRATE INTRAVENOUS at 14:00

## 2024-06-17 RX ADMIN — METOPROLOL SUCCINATE 25 MILLIGRAM(S): 50 TABLET, EXTENDED RELEASE ORAL at 05:51

## 2024-06-17 RX ADMIN — FINASTERIDE 5 MILLIGRAM(S): 1 TABLET, FILM COATED ORAL at 11:55

## 2024-06-17 RX ADMIN — CALCIUM GLUCONATE 100 GRAM(S): 20 INJECTION, SOLUTION INTRAVENOUS at 09:56

## 2024-06-17 RX ADMIN — CEFTRIAXONE 100 MILLIGRAM(S): 500 INJECTION, POWDER, FOR SOLUTION INTRAMUSCULAR; INTRAVENOUS at 12:55

## 2024-06-17 RX ADMIN — HEPARIN SODIUM 5000 UNIT(S): 1000 INJECTION INTRAVENOUS; SUBCUTANEOUS at 17:13

## 2024-06-17 RX ADMIN — Medication 70 MILLILITER(S): at 11:48

## 2024-06-17 RX ADMIN — OXYCODONE HYDROCHLORIDE 10 MILLIGRAM(S): 30 TABLET ORAL at 19:05

## 2024-06-17 RX ADMIN — Medication 81 MILLIGRAM(S): at 11:55

## 2024-06-18 ENCOUNTER — RESULT REVIEW (OUTPATIENT)
Age: 74
End: 2024-06-18

## 2024-06-18 LAB
ANION GAP SERPL CALC-SCNC: 13 MMOL/L — SIGNIFICANT CHANGE UP (ref 5–17)
APPEARANCE UR: CLEAR — SIGNIFICANT CHANGE UP
BACTERIA # UR AUTO: NEGATIVE /HPF — SIGNIFICANT CHANGE UP
BILIRUB UR-MCNC: NEGATIVE — SIGNIFICANT CHANGE UP
BUN SERPL-MCNC: 10 MG/DL — SIGNIFICANT CHANGE UP (ref 7–23)
C DIFF GDH STL QL: NEGATIVE — SIGNIFICANT CHANGE UP
C DIFF GDH STL QL: SIGNIFICANT CHANGE UP
CALCIUM SERPL-MCNC: 6.4 MG/DL — CRITICAL LOW (ref 8.4–10.5)
CAST: 3 /LPF — SIGNIFICANT CHANGE UP (ref 0–4)
CHLORIDE SERPL-SCNC: 105 MMOL/L — SIGNIFICANT CHANGE UP (ref 96–108)
CO2 SERPL-SCNC: 17 MMOL/L — LOW (ref 22–31)
COLOR SPEC: YELLOW — SIGNIFICANT CHANGE UP
CREAT SERPL-MCNC: 1.2 MG/DL — SIGNIFICANT CHANGE UP (ref 0.5–1.3)
DIFF PNL FLD: ABNORMAL
EGFR: 63 ML/MIN/1.73M2 — SIGNIFICANT CHANGE UP
EGFR: 63 ML/MIN/1.73M2 — SIGNIFICANT CHANGE UP
GLUCOSE SERPL-MCNC: 102 MG/DL — HIGH (ref 70–99)
GLUCOSE UR QL: 100 MG/DL
HCT VFR BLD CALC: 22.2 % — LOW (ref 39–50)
HGB BLD-MCNC: 7.5 G/DL — LOW (ref 13–17)
KETONES UR-MCNC: NEGATIVE MG/DL — SIGNIFICANT CHANGE UP
LEUKOCYTE ESTERASE UR-ACNC: NEGATIVE — SIGNIFICANT CHANGE UP
MCHC RBC-ENTMCNC: 29.1 PG — SIGNIFICANT CHANGE UP (ref 27–34)
MCHC RBC-ENTMCNC: 33.8 GM/DL — SIGNIFICANT CHANGE UP (ref 32–36)
MCV RBC AUTO: 86 FL — SIGNIFICANT CHANGE UP (ref 80–100)
NITRITE UR-MCNC: NEGATIVE — SIGNIFICANT CHANGE UP
NRBC # BLD: 0 /100 WBCS — SIGNIFICANT CHANGE UP (ref 0–0)
NRBC BLD-RTO: 0 /100 WBCS — SIGNIFICANT CHANGE UP (ref 0–0)
OB PNL STL: NEGATIVE — SIGNIFICANT CHANGE UP
PH UR: 6 — SIGNIFICANT CHANGE UP (ref 5–8)
PLATELET # BLD AUTO: 275 K/UL — SIGNIFICANT CHANGE UP (ref 150–400)
POTASSIUM SERPL-MCNC: 3.5 MMOL/L — SIGNIFICANT CHANGE UP (ref 3.5–5.3)
POTASSIUM SERPL-SCNC: 3.5 MMOL/L — SIGNIFICANT CHANGE UP (ref 3.5–5.3)
PROT UR-MCNC: 100 MG/DL
RBC # BLD: 2.58 M/UL — LOW (ref 4.2–5.8)
RBC # FLD: 14.7 % — HIGH (ref 10.3–14.5)
RBC CASTS # UR COMP ASSIST: 6 /HPF — HIGH (ref 0–4)
SODIUM SERPL-SCNC: 135 MMOL/L — SIGNIFICANT CHANGE UP (ref 135–145)
SP GR SPEC: 1.01 — SIGNIFICANT CHANGE UP (ref 1–1.03)
SQUAMOUS # UR AUTO: 2 /HPF — SIGNIFICANT CHANGE UP (ref 0–5)
UROBILINOGEN FLD QL: 0.2 MG/DL — SIGNIFICANT CHANGE UP (ref 0.2–1)
WBC # BLD: 6.06 K/UL — SIGNIFICANT CHANGE UP (ref 3.8–10.5)
WBC # FLD AUTO: 6.06 K/UL — SIGNIFICANT CHANGE UP (ref 3.8–10.5)
WBC UR QL: 2 /HPF — SIGNIFICANT CHANGE UP (ref 0–5)

## 2024-06-18 PROCEDURE — 99232 SBSQ HOSP IP/OBS MODERATE 35: CPT

## 2024-06-18 PROCEDURE — 93306 TTE W/DOPPLER COMPLETE: CPT | Mod: 26

## 2024-06-18 PROCEDURE — 99222 1ST HOSP IP/OBS MODERATE 55: CPT | Mod: GC

## 2024-06-18 RX ORDER — CALCIUM GLUCONATE 20 MG/ML
2 INJECTION, SOLUTION INTRAVENOUS ONCE
Refills: 0 | Status: COMPLETED | OUTPATIENT
Start: 2024-06-18 | End: 2024-06-18

## 2024-06-18 RX ORDER — GABAPENTIN 400 MG/1
50 CAPSULE ORAL THREE TIMES A DAY
Refills: 0 | Status: DISCONTINUED | OUTPATIENT
Start: 2024-06-18 | End: 2024-06-22

## 2024-06-18 RX ORDER — GABAPENTIN 400 MG/1
50 CAPSULE ORAL THREE TIMES A DAY
Refills: 0 | Status: DISCONTINUED | OUTPATIENT
Start: 2024-06-18 | End: 2024-06-18

## 2024-06-18 RX ADMIN — OXYCODONE HYDROCHLORIDE 10 MILLIGRAM(S): 30 TABLET ORAL at 14:04

## 2024-06-18 RX ADMIN — HEPARIN SODIUM 5000 UNIT(S): 1000 INJECTION INTRAVENOUS; SUBCUTANEOUS at 17:02

## 2024-06-18 RX ADMIN — Medication 70 MILLILITER(S): at 22:45

## 2024-06-18 RX ADMIN — FINASTERIDE 5 MILLIGRAM(S): 1 TABLET, FILM COATED ORAL at 12:15

## 2024-06-18 RX ADMIN — GABAPENTIN 50 MILLIGRAM(S): 400 CAPSULE ORAL at 22:44

## 2024-06-18 RX ADMIN — Medication 650 MILLIGRAM(S): at 21:39

## 2024-06-18 RX ADMIN — METOPROLOL SUCCINATE 25 MILLIGRAM(S): 50 TABLET, EXTENDED RELEASE ORAL at 17:02

## 2024-06-18 RX ADMIN — Medication 100 MILLIGRAM(S): at 12:15

## 2024-06-18 RX ADMIN — CEFTRIAXONE 100 MILLIGRAM(S): 500 INJECTION, POWDER, FOR SOLUTION INTRAMUSCULAR; INTRAVENOUS at 13:25

## 2024-06-18 RX ADMIN — HEPARIN SODIUM 5000 UNIT(S): 1000 INJECTION INTRAVENOUS; SUBCUTANEOUS at 06:11

## 2024-06-18 RX ADMIN — Medication 650 MILLIGRAM(S): at 23:00

## 2024-06-18 RX ADMIN — OXYCODONE HYDROCHLORIDE 10 MILLIGRAM(S): 30 TABLET ORAL at 17:02

## 2024-06-18 RX ADMIN — OXYCODONE HYDROCHLORIDE 10 MILLIGRAM(S): 30 TABLET ORAL at 06:11

## 2024-06-18 RX ADMIN — OXYCODONE HYDROCHLORIDE 10 MILLIGRAM(S): 30 TABLET ORAL at 07:20

## 2024-06-18 RX ADMIN — Medication 81 MILLIGRAM(S): at 12:15

## 2024-06-18 RX ADMIN — Medication 1 APPLICATION(S): at 12:12

## 2024-06-18 RX ADMIN — OXYCODONE HYDROCHLORIDE 10 MILLIGRAM(S): 30 TABLET ORAL at 18:22

## 2024-06-18 RX ADMIN — CALCIUM GLUCONATE 200 GRAM(S): 20 INJECTION, SOLUTION INTRAVENOUS at 10:30

## 2024-06-18 RX ADMIN — Medication 70 MILLILITER(S): at 06:10

## 2024-06-18 RX ADMIN — METOPROLOL SUCCINATE 25 MILLIGRAM(S): 50 TABLET, EXTENDED RELEASE ORAL at 06:11

## 2024-06-18 RX ADMIN — OXYCODONE HYDROCHLORIDE 10 MILLIGRAM(S): 30 TABLET ORAL at 14:35

## 2024-06-18 RX ADMIN — ATORVASTATIN CALCIUM 20 MILLIGRAM(S): 80 TABLET, FILM COATED ORAL at 21:39

## 2024-06-18 RX ADMIN — TAMSULOSIN HYDROCHLORIDE 0.4 MILLIGRAM(S): 0.4 CAPSULE ORAL at 21:40

## 2024-06-19 DIAGNOSIS — E83.51 HYPOCALCEMIA: ICD-10-CM

## 2024-06-19 DIAGNOSIS — I10 ESSENTIAL (PRIMARY) HYPERTENSION: ICD-10-CM

## 2024-06-19 DIAGNOSIS — E78.5 HYPERLIPIDEMIA, UNSPECIFIED: ICD-10-CM

## 2024-06-19 LAB
ALBUMIN SERPL ELPH-MCNC: 2.4 G/DL — LOW (ref 3.3–5)
ALP SERPL-CCNC: 48 U/L — SIGNIFICANT CHANGE UP (ref 40–120)
ALT FLD-CCNC: 76 U/L — HIGH (ref 10–45)
ANION GAP SERPL CALC-SCNC: 13 MMOL/L — SIGNIFICANT CHANGE UP (ref 5–17)
AST SERPL-CCNC: 60 U/L — HIGH (ref 10–40)
BILIRUB SERPL-MCNC: 0.3 MG/DL — SIGNIFICANT CHANGE UP (ref 0.2–1.2)
BLD GP AB SCN SERPL QL: POSITIVE — SIGNIFICANT CHANGE UP
BUN SERPL-MCNC: 15 MG/DL — SIGNIFICANT CHANGE UP (ref 7–23)
CALCIUM SERPL-MCNC: 6.5 MG/DL — CRITICAL LOW (ref 8.4–10.5)
CHLORIDE SERPL-SCNC: 105 MMOL/L — SIGNIFICANT CHANGE UP (ref 96–108)
CO2 SERPL-SCNC: 17 MMOL/L — LOW (ref 22–31)
CREAT SERPL-MCNC: 1.2 MG/DL — SIGNIFICANT CHANGE UP (ref 0.5–1.3)
CULTURE RESULTS: NO GROWTH — SIGNIFICANT CHANGE UP
CULTURE RESULTS: SIGNIFICANT CHANGE UP
CULTURE RESULTS: SIGNIFICANT CHANGE UP
EGFR: 63 ML/MIN/1.73M2 — SIGNIFICANT CHANGE UP
EGFR: 63 ML/MIN/1.73M2 — SIGNIFICANT CHANGE UP
GLUCOSE SERPL-MCNC: 97 MG/DL — SIGNIFICANT CHANGE UP (ref 70–99)
HCT VFR BLD CALC: 19.6 % — CRITICAL LOW (ref 39–50)
HCT VFR BLD CALC: 21.3 % — LOW (ref 39–50)
HGB BLD-MCNC: 6.6 G/DL — CRITICAL LOW (ref 13–17)
HGB BLD-MCNC: 7.2 G/DL — LOW (ref 13–17)
MCHC RBC-ENTMCNC: 28.9 PG — SIGNIFICANT CHANGE UP (ref 27–34)
MCHC RBC-ENTMCNC: 29.5 PG — SIGNIFICANT CHANGE UP (ref 27–34)
MCHC RBC-ENTMCNC: 33.7 GM/DL — SIGNIFICANT CHANGE UP (ref 32–36)
MCHC RBC-ENTMCNC: 33.8 GM/DL — SIGNIFICANT CHANGE UP (ref 32–36)
MCV RBC AUTO: 86 FL — SIGNIFICANT CHANGE UP (ref 80–100)
MCV RBC AUTO: 87.3 FL — SIGNIFICANT CHANGE UP (ref 80–100)
NRBC # BLD: 0 /100 WBCS — SIGNIFICANT CHANGE UP (ref 0–0)
NRBC # BLD: 0 /100 WBCS — SIGNIFICANT CHANGE UP (ref 0–0)
NRBC BLD-RTO: 0 /100 WBCS — SIGNIFICANT CHANGE UP (ref 0–0)
NRBC BLD-RTO: 0 /100 WBCS — SIGNIFICANT CHANGE UP (ref 0–0)
PLATELET # BLD AUTO: 289 K/UL — SIGNIFICANT CHANGE UP (ref 150–400)
PLATELET # BLD AUTO: 307 K/UL — SIGNIFICANT CHANGE UP (ref 150–400)
POTASSIUM SERPL-MCNC: 3.6 MMOL/L — SIGNIFICANT CHANGE UP (ref 3.5–5.3)
POTASSIUM SERPL-SCNC: 3.6 MMOL/L — SIGNIFICANT CHANGE UP (ref 3.5–5.3)
PROT SERPL-MCNC: 5.3 G/DL — LOW (ref 6–8.3)
RBC # BLD: 2.28 M/UL — LOW (ref 4.2–5.8)
RBC # BLD: 2.44 M/UL — LOW (ref 4.2–5.8)
RBC # FLD: 15 % — HIGH (ref 10.3–14.5)
RBC # FLD: 15.1 % — HIGH (ref 10.3–14.5)
RH IG SCN BLD-IMP: POSITIVE — SIGNIFICANT CHANGE UP
SODIUM SERPL-SCNC: 135 MMOL/L — SIGNIFICANT CHANGE UP (ref 135–145)
SPECIMEN SOURCE: SIGNIFICANT CHANGE UP
URATE SERPL-MCNC: 2.8 MG/DL — LOW (ref 3.4–8.8)
WBC # BLD: 3.77 K/UL — LOW (ref 3.8–10.5)
WBC # BLD: 3.84 K/UL — SIGNIFICANT CHANGE UP (ref 3.8–10.5)
WBC # FLD AUTO: 3.77 K/UL — LOW (ref 3.8–10.5)
WBC # FLD AUTO: 3.84 K/UL — SIGNIFICANT CHANGE UP (ref 3.8–10.5)

## 2024-06-19 PROCEDURE — 93010 ELECTROCARDIOGRAM REPORT: CPT

## 2024-06-19 PROCEDURE — 93970 EXTREMITY STUDY: CPT | Mod: 26

## 2024-06-19 RX ORDER — CALCIUM CARBONATE 750 MG/1
1 TABLET ORAL
Refills: 0 | Status: DISCONTINUED | OUTPATIENT
Start: 2024-06-19 | End: 2024-06-22

## 2024-06-19 RX ORDER — CALCIUM GLUCONATE 20 MG/ML
2 INJECTION, SOLUTION INTRAVENOUS ONCE
Refills: 0 | Status: COMPLETED | OUTPATIENT
Start: 2024-06-19 | End: 2024-06-19

## 2024-06-19 RX ORDER — CALCITRIOL 0.5 UG/1
0.5 CAPSULE, GELATIN COATED ORAL DAILY
Refills: 0 | Status: DISCONTINUED | OUTPATIENT
Start: 2024-06-19 | End: 2024-06-22

## 2024-06-19 RX ADMIN — Medication 100 MILLIGRAM(S): at 11:40

## 2024-06-19 RX ADMIN — CALCIUM GLUCONATE 200 GRAM(S): 20 INJECTION, SOLUTION INTRAVENOUS at 08:59

## 2024-06-19 RX ADMIN — OXYCODONE HYDROCHLORIDE 10 MILLIGRAM(S): 30 TABLET ORAL at 23:39

## 2024-06-19 RX ADMIN — OXYCODONE HYDROCHLORIDE 10 MILLIGRAM(S): 30 TABLET ORAL at 22:22

## 2024-06-19 RX ADMIN — Medication 81 MILLIGRAM(S): at 11:40

## 2024-06-19 RX ADMIN — Medication 1 APPLICATION(S): at 12:21

## 2024-06-19 RX ADMIN — HEPARIN SODIUM 5000 UNIT(S): 1000 INJECTION INTRAVENOUS; SUBCUTANEOUS at 06:02

## 2024-06-19 RX ADMIN — OXYCODONE HYDROCHLORIDE 10 MILLIGRAM(S): 30 TABLET ORAL at 15:49

## 2024-06-19 RX ADMIN — METOPROLOL SUCCINATE 25 MILLIGRAM(S): 50 TABLET, EXTENDED RELEASE ORAL at 17:35

## 2024-06-19 RX ADMIN — HEPARIN SODIUM 5000 UNIT(S): 1000 INJECTION INTRAVENOUS; SUBCUTANEOUS at 17:35

## 2024-06-19 RX ADMIN — METOPROLOL SUCCINATE 25 MILLIGRAM(S): 50 TABLET, EXTENDED RELEASE ORAL at 06:06

## 2024-06-19 RX ADMIN — GABAPENTIN 50 MILLIGRAM(S): 400 CAPSULE ORAL at 14:51

## 2024-06-19 RX ADMIN — OXYCODONE HYDROCHLORIDE 10 MILLIGRAM(S): 30 TABLET ORAL at 14:48

## 2024-06-19 RX ADMIN — GABAPENTIN 50 MILLIGRAM(S): 400 CAPSULE ORAL at 06:02

## 2024-06-19 RX ADMIN — CALCITRIOL 0.5 MICROGRAM(S): 0.5 CAPSULE, GELATIN COATED ORAL at 14:49

## 2024-06-19 RX ADMIN — FINASTERIDE 5 MILLIGRAM(S): 1 TABLET, FILM COATED ORAL at 11:40

## 2024-06-19 RX ADMIN — CALCIUM CARBONATE 1 TABLET(S): 750 TABLET ORAL at 14:49

## 2024-06-19 RX ADMIN — ATORVASTATIN CALCIUM 20 MILLIGRAM(S): 80 TABLET, FILM COATED ORAL at 22:18

## 2024-06-19 RX ADMIN — OXYCODONE HYDROCHLORIDE 10 MILLIGRAM(S): 30 TABLET ORAL at 06:44

## 2024-06-19 RX ADMIN — OXYCODONE HYDROCHLORIDE 10 MILLIGRAM(S): 30 TABLET ORAL at 06:06

## 2024-06-19 RX ADMIN — GABAPENTIN 50 MILLIGRAM(S): 400 CAPSULE ORAL at 22:38

## 2024-06-19 RX ADMIN — TAMSULOSIN HYDROCHLORIDE 0.4 MILLIGRAM(S): 0.4 CAPSULE ORAL at 22:18

## 2024-06-20 LAB
24R-OH-CALCIDIOL SERPL-MCNC: <6 NG/ML — LOW (ref 30–80)
ALBUMIN SERPL ELPH-MCNC: 2.6 G/DL — LOW (ref 3.3–5)
ALP SERPL-CCNC: 61 U/L — SIGNIFICANT CHANGE UP (ref 40–120)
ALT FLD-CCNC: 181 U/L — HIGH (ref 10–45)
ANION GAP SERPL CALC-SCNC: 11 MMOL/L — SIGNIFICANT CHANGE UP (ref 5–17)
AST SERPL-CCNC: 172 U/L — HIGH (ref 10–40)
BILIRUB SERPL-MCNC: 0.3 MG/DL — SIGNIFICANT CHANGE UP (ref 0.2–1.2)
BUN SERPL-MCNC: 14 MG/DL — SIGNIFICANT CHANGE UP (ref 7–23)
CALCIUM SERPL-MCNC: 6.8 MG/DL — LOW (ref 8.4–10.5)
CALCIUM SERPL-MCNC: 7.3 MG/DL — LOW (ref 8.4–10.5)
CALCIUM UR-MCNC: 2.3 MG/DL — SIGNIFICANT CHANGE UP
CALCIUM/CREAT UR: 0 RATIO — SIGNIFICANT CHANGE UP (ref 0–0.2)
CHLORIDE SERPL-SCNC: 105 MMOL/L — SIGNIFICANT CHANGE UP (ref 96–108)
CO2 SERPL-SCNC: 20 MMOL/L — LOW (ref 22–31)
CREAT ?TM UR-MCNC: 65 MG/DL — SIGNIFICANT CHANGE UP
CREAT SERPL-MCNC: 1.07 MG/DL — SIGNIFICANT CHANGE UP (ref 0.5–1.3)
EGFR: 72 ML/MIN/1.73M2 — SIGNIFICANT CHANGE UP
EGFR: 72 ML/MIN/1.73M2 — SIGNIFICANT CHANGE UP
GLUCOSE SERPL-MCNC: 93 MG/DL — SIGNIFICANT CHANGE UP (ref 70–99)
HAPTOGLOB SERPL-MCNC: 488 MG/DL — HIGH (ref 34–200)
HCT VFR BLD CALC: 19.3 % — CRITICAL LOW (ref 39–50)
HCT VFR BLD CALC: 20.4 % — CRITICAL LOW (ref 39–50)
HCT VFR BLD CALC: 23.4 % — LOW (ref 39–50)
HGB BLD-MCNC: 6.6 G/DL — CRITICAL LOW (ref 13–17)
HGB BLD-MCNC: 6.8 G/DL — CRITICAL LOW (ref 13–17)
HGB BLD-MCNC: 7.8 G/DL — LOW (ref 13–17)
MCHC RBC-ENTMCNC: 28.6 PG — SIGNIFICANT CHANGE UP (ref 27–34)
MCHC RBC-ENTMCNC: 29.1 PG — SIGNIFICANT CHANGE UP (ref 27–34)
MCHC RBC-ENTMCNC: 29.3 PG — SIGNIFICANT CHANGE UP (ref 27–34)
MCHC RBC-ENTMCNC: 33.3 GM/DL — SIGNIFICANT CHANGE UP (ref 32–36)
MCHC RBC-ENTMCNC: 33.3 GM/DL — SIGNIFICANT CHANGE UP (ref 32–36)
MCHC RBC-ENTMCNC: 34.2 GM/DL — SIGNIFICANT CHANGE UP (ref 32–36)
MCV RBC AUTO: 85.7 FL — SIGNIFICANT CHANGE UP (ref 80–100)
MCV RBC AUTO: 85.8 FL — SIGNIFICANT CHANGE UP (ref 80–100)
MCV RBC AUTO: 87.2 FL — SIGNIFICANT CHANGE UP (ref 80–100)
NRBC # BLD: 0 /100 WBCS — SIGNIFICANT CHANGE UP (ref 0–0)
NRBC BLD-RTO: 0 /100 WBCS — SIGNIFICANT CHANGE UP (ref 0–0)
PLATELET # BLD AUTO: 307 K/UL — SIGNIFICANT CHANGE UP (ref 150–400)
PLATELET # BLD AUTO: 312 K/UL — SIGNIFICANT CHANGE UP (ref 150–400)
PLATELET # BLD AUTO: 323 K/UL — SIGNIFICANT CHANGE UP (ref 150–400)
POTASSIUM SERPL-MCNC: 3.8 MMOL/L — SIGNIFICANT CHANGE UP (ref 3.5–5.3)
POTASSIUM SERPL-SCNC: 3.8 MMOL/L — SIGNIFICANT CHANGE UP (ref 3.5–5.3)
PROT SERPL-MCNC: 5.3 G/DL — LOW (ref 6–8.3)
PTH-INTACT FLD-MCNC: 206 PG/ML — HIGH (ref 15–65)
RBC # BLD: 2.25 M/UL — LOW (ref 4.2–5.8)
RBC # BLD: 2.34 M/UL — LOW (ref 4.2–5.8)
RBC # BLD: 2.61 M/UL — LOW (ref 4.2–5.8)
RBC # BLD: 2.73 M/UL — LOW (ref 4.2–5.8)
RBC # FLD: 15.1 % — HIGH (ref 10.3–14.5)
RBC # FLD: 15.1 % — HIGH (ref 10.3–14.5)
RBC # FLD: 15.2 % — HIGH (ref 10.3–14.5)
RETICS #: 11.5 K/UL — LOW (ref 25–125)
RETICS/RBC NFR: 0.4 % — LOW (ref 0.5–2.5)
SODIUM SERPL-SCNC: 136 MMOL/L — SIGNIFICANT CHANGE UP (ref 135–145)
T4 FREE SERPL-MCNC: 1.6 NG/DL — SIGNIFICANT CHANGE UP (ref 0.9–1.8)
TSH SERPL-MCNC: 0.58 UIU/ML — SIGNIFICANT CHANGE UP (ref 0.27–4.2)
VIT D25+D1,25 OH+D1,25 PNL SERPL-MCNC: 75.9 PG/ML — SIGNIFICANT CHANGE UP (ref 19.9–79.3)
WBC # BLD: 3.11 K/UL — LOW (ref 3.8–10.5)
WBC # BLD: 3.14 K/UL — LOW (ref 3.8–10.5)
WBC # BLD: 3.48 K/UL — LOW (ref 3.8–10.5)
WBC # FLD AUTO: 3.11 K/UL — LOW (ref 3.8–10.5)
WBC # FLD AUTO: 3.14 K/UL — LOW (ref 3.8–10.5)
WBC # FLD AUTO: 3.48 K/UL — LOW (ref 3.8–10.5)

## 2024-06-20 PROCEDURE — 99233 SBSQ HOSP IP/OBS HIGH 50: CPT

## 2024-06-20 RX ORDER — ERGOCALCIFEROL 1.25 MG/1
50000 CAPSULE ORAL
Refills: 0 | Status: DISCONTINUED | OUTPATIENT
Start: 2024-06-20 | End: 2024-06-20

## 2024-06-20 RX ORDER — ACETAMINOPHEN 500 MG/5ML
1000 LIQUID (ML) ORAL ONCE
Refills: 0 | Status: COMPLETED | OUTPATIENT
Start: 2024-06-20 | End: 2024-06-20

## 2024-06-20 RX ORDER — DIPHENHYDRAMINE HCL 12.5MG/5ML
25 ELIXIR ORAL ONCE
Refills: 0 | Status: COMPLETED | OUTPATIENT
Start: 2024-06-20 | End: 2024-06-20

## 2024-06-20 RX ORDER — ERGOCALCIFEROL 1.25 MG/1
50000 CAPSULE ORAL
Refills: 0 | Status: DISCONTINUED | OUTPATIENT
Start: 2024-06-20 | End: 2024-06-22

## 2024-06-20 RX ADMIN — FINASTERIDE 5 MILLIGRAM(S): 1 TABLET, FILM COATED ORAL at 11:01

## 2024-06-20 RX ADMIN — Medication 25 MILLIGRAM(S): at 17:58

## 2024-06-20 RX ADMIN — METOPROLOL SUCCINATE 25 MILLIGRAM(S): 50 TABLET, EXTENDED RELEASE ORAL at 17:09

## 2024-06-20 RX ADMIN — HEPARIN SODIUM 5000 UNIT(S): 1000 INJECTION INTRAVENOUS; SUBCUTANEOUS at 17:11

## 2024-06-20 RX ADMIN — Medication 81 MILLIGRAM(S): at 11:02

## 2024-06-20 RX ADMIN — CALCITRIOL 0.5 MICROGRAM(S): 0.5 CAPSULE, GELATIN COATED ORAL at 11:02

## 2024-06-20 RX ADMIN — OXYCODONE HYDROCHLORIDE 10 MILLIGRAM(S): 30 TABLET ORAL at 06:18

## 2024-06-20 RX ADMIN — Medication 400 MILLIGRAM(S): at 10:04

## 2024-06-20 RX ADMIN — Medication 1 APPLICATION(S): at 11:02

## 2024-06-20 RX ADMIN — TAMSULOSIN HYDROCHLORIDE 0.4 MILLIGRAM(S): 0.4 CAPSULE ORAL at 22:14

## 2024-06-20 RX ADMIN — CALCIUM CARBONATE 1 TABLET(S): 750 TABLET ORAL at 05:20

## 2024-06-20 RX ADMIN — Medication 70 MILLILITER(S): at 06:05

## 2024-06-20 RX ADMIN — GABAPENTIN 50 MILLIGRAM(S): 400 CAPSULE ORAL at 06:05

## 2024-06-20 RX ADMIN — OXYCODONE HYDROCHLORIDE 10 MILLIGRAM(S): 30 TABLET ORAL at 05:20

## 2024-06-20 RX ADMIN — Medication 100 MILLIGRAM(S): at 11:02

## 2024-06-20 RX ADMIN — OXYCODONE HYDROCHLORIDE 10 MILLIGRAM(S): 30 TABLET ORAL at 17:09

## 2024-06-20 RX ADMIN — GABAPENTIN 50 MILLIGRAM(S): 400 CAPSULE ORAL at 22:49

## 2024-06-20 RX ADMIN — HEPARIN SODIUM 5000 UNIT(S): 1000 INJECTION INTRAVENOUS; SUBCUTANEOUS at 05:21

## 2024-06-20 RX ADMIN — Medication 25 MILLIGRAM(S): at 10:04

## 2024-06-20 RX ADMIN — METOPROLOL SUCCINATE 25 MILLIGRAM(S): 50 TABLET, EXTENDED RELEASE ORAL at 05:22

## 2024-06-20 RX ADMIN — Medication 650 MILLIGRAM(S): at 17:57

## 2024-06-20 RX ADMIN — CALCIUM CARBONATE 1 TABLET(S): 750 TABLET ORAL at 17:09

## 2024-06-21 ENCOUNTER — TRANSCRIPTION ENCOUNTER (OUTPATIENT)
Age: 74
End: 2024-06-21

## 2024-06-21 LAB
ANION GAP SERPL CALC-SCNC: 10 MMOL/L — SIGNIFICANT CHANGE UP (ref 5–17)
BASOPHILS # BLD AUTO: 0 K/UL — SIGNIFICANT CHANGE UP (ref 0–0.2)
BASOPHILS NFR BLD AUTO: 0 % — SIGNIFICANT CHANGE UP (ref 0–2)
BUN SERPL-MCNC: 16 MG/DL — SIGNIFICANT CHANGE UP (ref 7–23)
BURR CELLS BLD QL SMEAR: PRESENT — SIGNIFICANT CHANGE UP
CALCIUM SERPL-MCNC: 8.2 MG/DL — LOW (ref 8.4–10.5)
CHLORIDE SERPL-SCNC: 107 MMOL/L — SIGNIFICANT CHANGE UP (ref 96–108)
CO2 SERPL-SCNC: 21 MMOL/L — LOW (ref 22–31)
CREAT SERPL-MCNC: 0.98 MG/DL — SIGNIFICANT CHANGE UP (ref 0.5–1.3)
DACRYOCYTES BLD QL SMEAR: SLIGHT — SIGNIFICANT CHANGE UP
EGFR: 80 ML/MIN/1.73M2 — SIGNIFICANT CHANGE UP
EGFR: 80 ML/MIN/1.73M2 — SIGNIFICANT CHANGE UP
ELLIPTOCYTES BLD QL SMEAR: SIGNIFICANT CHANGE UP
EOSINOPHIL # BLD AUTO: 0.03 K/UL — SIGNIFICANT CHANGE UP (ref 0–0.5)
EOSINOPHIL NFR BLD AUTO: 0.9 % — SIGNIFICANT CHANGE UP (ref 0–6)
GIANT PLATELETS BLD QL SMEAR: PRESENT — SIGNIFICANT CHANGE UP
GLUCOSE SERPL-MCNC: 93 MG/DL — SIGNIFICANT CHANGE UP (ref 70–99)
HCT VFR BLD CALC: 28.1 % — LOW (ref 39–50)
HGB BLD-MCNC: 9.1 G/DL — LOW (ref 13–17)
LYMPHOCYTES # BLD AUTO: 1.02 K/UL — SIGNIFICANT CHANGE UP (ref 1–3.3)
LYMPHOCYTES # BLD AUTO: 30.4 % — SIGNIFICANT CHANGE UP (ref 13–44)
MAGNESIUM SERPL-MCNC: 1.5 MG/DL — LOW (ref 1.6–2.6)
MANUAL SMEAR VERIFICATION: SIGNIFICANT CHANGE UP
MCHC RBC-ENTMCNC: 28.5 PG — SIGNIFICANT CHANGE UP (ref 27–34)
MCHC RBC-ENTMCNC: 32.4 GM/DL — SIGNIFICANT CHANGE UP (ref 32–36)
MCV RBC AUTO: 88.1 FL — SIGNIFICANT CHANGE UP (ref 80–100)
MONOCYTES # BLD AUTO: 0.45 K/UL — SIGNIFICANT CHANGE UP (ref 0–0.9)
MONOCYTES NFR BLD AUTO: 13.4 % — SIGNIFICANT CHANGE UP (ref 2–14)
NEUTROPHILS # BLD AUTO: 1.85 K/UL — SIGNIFICANT CHANGE UP (ref 1.8–7.4)
NEUTROPHILS NFR BLD AUTO: 55.3 % — SIGNIFICANT CHANGE UP (ref 43–77)
OB PNL STL: NEGATIVE — SIGNIFICANT CHANGE UP
PHOSPHATE SERPL-MCNC: 1.4 MG/DL — LOW (ref 2.5–4.5)
PLAT MORPH BLD: ABNORMAL
PLATELET # BLD AUTO: 331 K/UL — SIGNIFICANT CHANGE UP (ref 150–400)
POIKILOCYTOSIS BLD QL AUTO: SIGNIFICANT CHANGE UP
POTASSIUM SERPL-MCNC: 4.3 MMOL/L — SIGNIFICANT CHANGE UP (ref 3.5–5.3)
POTASSIUM SERPL-SCNC: 4.3 MMOL/L — SIGNIFICANT CHANGE UP (ref 3.5–5.3)
RBC # BLD: 3.19 M/UL — LOW (ref 4.2–5.8)
RBC # FLD: 15.5 % — HIGH (ref 10.3–14.5)
RBC BLD AUTO: ABNORMAL
SCHISTOCYTES BLD QL AUTO: SIGNIFICANT CHANGE UP
SODIUM SERPL-SCNC: 138 MMOL/L — SIGNIFICANT CHANGE UP (ref 135–145)
WBC # BLD: 3.35 K/UL — LOW (ref 3.8–10.5)
WBC # FLD AUTO: 3.35 K/UL — LOW (ref 3.8–10.5)

## 2024-06-21 PROCEDURE — 74176 CT ABD & PELVIS W/O CONTRAST: CPT | Mod: 26

## 2024-06-21 RX ORDER — MAGNESIUM SULFATE 500 MG/ML
2 SYRINGE (ML) INJECTION ONCE
Refills: 0 | Status: COMPLETED | OUTPATIENT
Start: 2024-06-21 | End: 2024-06-21

## 2024-06-21 RX ORDER — POTASSIUM PHOSPHATE, MONOBASIC POTASSIUM PHOSPHATE, DIBASIC INJECTION, 236; 224 MG/ML; MG/ML
15 SOLUTION, CONCENTRATE INTRAVENOUS ONCE
Refills: 0 | Status: COMPLETED | OUTPATIENT
Start: 2024-06-21 | End: 2024-06-21

## 2024-06-21 RX ORDER — SODIUM PHOSPHATE,DIBASIC DIHYD
30 POWDER (GRAM) MISCELLANEOUS ONCE
Refills: 0 | Status: DISCONTINUED | OUTPATIENT
Start: 2024-06-21 | End: 2024-06-21

## 2024-06-21 RX ADMIN — HEPARIN SODIUM 5000 UNIT(S): 1000 INJECTION INTRAVENOUS; SUBCUTANEOUS at 17:03

## 2024-06-21 RX ADMIN — TAMSULOSIN HYDROCHLORIDE 0.4 MILLIGRAM(S): 0.4 CAPSULE ORAL at 21:32

## 2024-06-21 RX ADMIN — CALCITRIOL 0.5 MICROGRAM(S): 0.5 CAPSULE, GELATIN COATED ORAL at 11:30

## 2024-06-21 RX ADMIN — OXYCODONE HYDROCHLORIDE 10 MILLIGRAM(S): 30 TABLET ORAL at 17:03

## 2024-06-21 RX ADMIN — GABAPENTIN 50 MILLIGRAM(S): 400 CAPSULE ORAL at 05:45

## 2024-06-21 RX ADMIN — HEPARIN SODIUM 5000 UNIT(S): 1000 INJECTION INTRAVENOUS; SUBCUTANEOUS at 05:17

## 2024-06-21 RX ADMIN — GABAPENTIN 50 MILLIGRAM(S): 400 CAPSULE ORAL at 11:38

## 2024-06-21 RX ADMIN — METOPROLOL SUCCINATE 25 MILLIGRAM(S): 50 TABLET, EXTENDED RELEASE ORAL at 17:02

## 2024-06-21 RX ADMIN — OXYCODONE HYDROCHLORIDE 10 MILLIGRAM(S): 30 TABLET ORAL at 07:41

## 2024-06-21 RX ADMIN — OXYCODONE HYDROCHLORIDE 10 MILLIGRAM(S): 30 TABLET ORAL at 05:15

## 2024-06-21 RX ADMIN — OXYCODONE HYDROCHLORIDE 10 MILLIGRAM(S): 30 TABLET ORAL at 17:41

## 2024-06-21 RX ADMIN — CALCIUM CARBONATE 1 TABLET(S): 750 TABLET ORAL at 05:15

## 2024-06-21 RX ADMIN — ERGOCALCIFEROL 50000 UNIT(S): 1.25 CAPSULE ORAL at 11:30

## 2024-06-21 RX ADMIN — METOPROLOL SUCCINATE 25 MILLIGRAM(S): 50 TABLET, EXTENDED RELEASE ORAL at 05:15

## 2024-06-21 RX ADMIN — CALCIUM CARBONATE 1 TABLET(S): 750 TABLET ORAL at 17:02

## 2024-06-21 RX ADMIN — FINASTERIDE 5 MILLIGRAM(S): 1 TABLET, FILM COATED ORAL at 11:30

## 2024-06-21 RX ADMIN — Medication 1 APPLICATION(S): at 11:33

## 2024-06-21 RX ADMIN — Medication 100 MILLIGRAM(S): at 11:30

## 2024-06-21 RX ADMIN — Medication 25 GRAM(S): at 09:25

## 2024-06-21 RX ADMIN — GABAPENTIN 50 MILLIGRAM(S): 400 CAPSULE ORAL at 21:32

## 2024-06-21 RX ADMIN — POTASSIUM PHOSPHATE, MONOBASIC POTASSIUM PHOSPHATE, DIBASIC INJECTION, 62.5 MILLIMOLE(S): 236; 224 SOLUTION, CONCENTRATE INTRAVENOUS at 09:25

## 2024-06-21 RX ADMIN — Medication 81 MILLIGRAM(S): at 11:30

## 2024-06-22 ENCOUNTER — TRANSCRIPTION ENCOUNTER (OUTPATIENT)
Age: 74
End: 2024-06-22

## 2024-06-22 VITALS
HEART RATE: 74 BPM | RESPIRATION RATE: 18 BRPM | TEMPERATURE: 98 F | OXYGEN SATURATION: 96 % | SYSTOLIC BLOOD PRESSURE: 112 MMHG | DIASTOLIC BLOOD PRESSURE: 66 MMHG

## 2024-06-22 LAB
ANION GAP SERPL CALC-SCNC: 9 MMOL/L — SIGNIFICANT CHANGE UP (ref 5–17)
BASOPHILS # BLD AUTO: 0.05 K/UL — SIGNIFICANT CHANGE UP (ref 0–0.2)
BASOPHILS NFR BLD AUTO: 1.6 % — SIGNIFICANT CHANGE UP (ref 0–2)
BUN SERPL-MCNC: 14 MG/DL — SIGNIFICANT CHANGE UP (ref 7–23)
CALCIUM SERPL-MCNC: 8.2 MG/DL — LOW (ref 8.4–10.5)
CHLORIDE SERPL-SCNC: 108 MMOL/L — SIGNIFICANT CHANGE UP (ref 96–108)
CO2 SERPL-SCNC: 21 MMOL/L — LOW (ref 22–31)
CREAT SERPL-MCNC: 0.95 MG/DL — SIGNIFICANT CHANGE UP (ref 0.5–1.3)
EGFR: 83 ML/MIN/1.73M2 — SIGNIFICANT CHANGE UP
EGFR: 83 ML/MIN/1.73M2 — SIGNIFICANT CHANGE UP
EOSINOPHIL # BLD AUTO: 0.14 K/UL — SIGNIFICANT CHANGE UP (ref 0–0.5)
EOSINOPHIL NFR BLD AUTO: 4.4 % — SIGNIFICANT CHANGE UP (ref 0–6)
GLUCOSE SERPL-MCNC: 94 MG/DL — SIGNIFICANT CHANGE UP (ref 70–99)
HCT VFR BLD CALC: 25.5 % — LOW (ref 39–50)
HGB BLD-MCNC: 8.7 G/DL — LOW (ref 13–17)
IMM GRANULOCYTES NFR BLD AUTO: 0.6 % — SIGNIFICANT CHANGE UP (ref 0–0.9)
LYMPHOCYTES # BLD AUTO: 1.06 K/UL — SIGNIFICANT CHANGE UP (ref 1–3.3)
LYMPHOCYTES # BLD AUTO: 33.7 % — SIGNIFICANT CHANGE UP (ref 13–44)
MAGNESIUM SERPL-MCNC: 1.7 MG/DL — SIGNIFICANT CHANGE UP (ref 1.6–2.6)
MCHC RBC-ENTMCNC: 29 PG — SIGNIFICANT CHANGE UP (ref 27–34)
MCHC RBC-ENTMCNC: 34.1 GM/DL — SIGNIFICANT CHANGE UP (ref 32–36)
MCV RBC AUTO: 85 FL — SIGNIFICANT CHANGE UP (ref 80–100)
MONOCYTES # BLD AUTO: 0.48 K/UL — SIGNIFICANT CHANGE UP (ref 0–0.9)
MONOCYTES NFR BLD AUTO: 15.2 % — HIGH (ref 2–14)
NEUTROPHILS # BLD AUTO: 1.4 K/UL — LOW (ref 1.8–7.4)
NEUTROPHILS NFR BLD AUTO: 44.5 % — SIGNIFICANT CHANGE UP (ref 43–77)
NRBC # BLD: 0 /100 WBCS — SIGNIFICANT CHANGE UP (ref 0–0)
NRBC BLD-RTO: 0 /100 WBCS — SIGNIFICANT CHANGE UP (ref 0–0)
PHOSPHATE SERPL-MCNC: 1.7 MG/DL — LOW (ref 2.5–4.5)
PLATELET # BLD AUTO: 307 K/UL — SIGNIFICANT CHANGE UP (ref 150–400)
POTASSIUM SERPL-MCNC: 4.4 MMOL/L — SIGNIFICANT CHANGE UP (ref 3.5–5.3)
POTASSIUM SERPL-SCNC: 4.4 MMOL/L — SIGNIFICANT CHANGE UP (ref 3.5–5.3)
RBC # BLD: 3 M/UL — LOW (ref 4.2–5.8)
RBC # FLD: 15.1 % — HIGH (ref 10.3–14.5)
SODIUM SERPL-SCNC: 138 MMOL/L — SIGNIFICANT CHANGE UP (ref 135–145)
WBC # BLD: 3.15 K/UL — LOW (ref 3.8–10.5)
WBC # FLD AUTO: 3.15 K/UL — LOW (ref 3.8–10.5)

## 2024-06-22 PROCEDURE — 76377 3D RENDER W/INTRP POSTPROCES: CPT

## 2024-06-22 PROCEDURE — 82330 ASSAY OF CALCIUM: CPT

## 2024-06-22 PROCEDURE — 83605 ASSAY OF LACTIC ACID: CPT

## 2024-06-22 PROCEDURE — 86902 BLOOD TYPE ANTIGEN DONOR EA: CPT

## 2024-06-22 PROCEDURE — 93970 EXTREMITY STUDY: CPT

## 2024-06-22 PROCEDURE — 82746 ASSAY OF FOLIC ACID SERUM: CPT

## 2024-06-22 PROCEDURE — 97116 GAIT TRAINING THERAPY: CPT

## 2024-06-22 PROCEDURE — 82306 VITAMIN D 25 HYDROXY: CPT

## 2024-06-22 PROCEDURE — 82652 VIT D 1 25-DIHYDROXY: CPT

## 2024-06-22 PROCEDURE — 86905 BLOOD TYPING RBC ANTIGENS: CPT

## 2024-06-22 PROCEDURE — 84132 ASSAY OF SERUM POTASSIUM: CPT

## 2024-06-22 PROCEDURE — 85025 COMPLETE CBC W/AUTO DIFF WBC: CPT

## 2024-06-22 PROCEDURE — 85027 COMPLETE CBC AUTOMATED: CPT

## 2024-06-22 PROCEDURE — 85045 AUTOMATED RETICULOCYTE COUNT: CPT

## 2024-06-22 PROCEDURE — C8929: CPT

## 2024-06-22 PROCEDURE — 82570 ASSAY OF URINE CREATININE: CPT

## 2024-06-22 PROCEDURE — 86870 RBC ANTIBODY IDENTIFICATION: CPT

## 2024-06-22 PROCEDURE — 85610 PROTHROMBIN TIME: CPT

## 2024-06-22 PROCEDURE — 86900 BLOOD TYPING SEROLOGIC ABO: CPT

## 2024-06-22 PROCEDURE — 72158 MRI LUMBAR SPINE W/O & W/DYE: CPT | Mod: MC

## 2024-06-22 PROCEDURE — 86880 COOMBS TEST DIRECT: CPT

## 2024-06-22 PROCEDURE — 97110 THERAPEUTIC EXERCISES: CPT

## 2024-06-22 PROCEDURE — 83970 ASSAY OF PARATHORMONE: CPT

## 2024-06-22 PROCEDURE — 83735 ASSAY OF MAGNESIUM: CPT

## 2024-06-22 PROCEDURE — 82310 ASSAY OF CALCIUM: CPT

## 2024-06-22 PROCEDURE — 73501 X-RAY EXAM HIP UNI 1 VIEW: CPT

## 2024-06-22 PROCEDURE — 99285 EMERGENCY DEPT VISIT HI MDM: CPT

## 2024-06-22 PROCEDURE — 83550 IRON BINDING TEST: CPT

## 2024-06-22 PROCEDURE — 96375 TX/PRO/DX INJ NEW DRUG ADDON: CPT

## 2024-06-22 PROCEDURE — 36430 TRANSFUSION BLD/BLD COMPNT: CPT

## 2024-06-22 PROCEDURE — P9040: CPT

## 2024-06-22 PROCEDURE — 86922 COMPATIBILITY TEST ANTIGLOB: CPT

## 2024-06-22 PROCEDURE — 84295 ASSAY OF SERUM SODIUM: CPT

## 2024-06-22 PROCEDURE — 85730 THROMBOPLASTIN TIME PARTIAL: CPT

## 2024-06-22 PROCEDURE — 84550 ASSAY OF BLOOD/URIC ACID: CPT

## 2024-06-22 PROCEDURE — 73070 X-RAY EXAM OF ELBOW: CPT

## 2024-06-22 PROCEDURE — 84439 ASSAY OF FREE THYROXINE: CPT

## 2024-06-22 PROCEDURE — 82272 OCCULT BLD FECES 1-3 TESTS: CPT

## 2024-06-22 PROCEDURE — 82340 ASSAY OF CALCIUM IN URINE: CPT

## 2024-06-22 PROCEDURE — 83010 ASSAY OF HAPTOGLOBIN QUANT: CPT

## 2024-06-22 PROCEDURE — 85014 HEMATOCRIT: CPT

## 2024-06-22 PROCEDURE — 87640 STAPH A DNA AMP PROBE: CPT

## 2024-06-22 PROCEDURE — 86850 RBC ANTIBODY SCREEN: CPT

## 2024-06-22 PROCEDURE — 87641 MR-STAPH DNA AMP PROBE: CPT

## 2024-06-22 PROCEDURE — 82803 BLOOD GASES ANY COMBINATION: CPT

## 2024-06-22 PROCEDURE — 83540 ASSAY OF IRON: CPT

## 2024-06-22 PROCEDURE — 87637 SARSCOV2&INF A&B&RSV AMP PRB: CPT

## 2024-06-22 PROCEDURE — 80048 BASIC METABOLIC PNL TOTAL CA: CPT

## 2024-06-22 PROCEDURE — 85018 HEMOGLOBIN: CPT

## 2024-06-22 PROCEDURE — 82607 VITAMIN B-12: CPT

## 2024-06-22 PROCEDURE — 86901 BLOOD TYPING SEROLOGIC RH(D): CPT

## 2024-06-22 PROCEDURE — 74176 CT ABD & PELVIS W/O CONTRAST: CPT | Mod: MC

## 2024-06-22 PROCEDURE — 87449 NOS EACH ORGANISM AG IA: CPT

## 2024-06-22 PROCEDURE — 86970 RBC PRETX INCUBATJ W/CHEMICL: CPT

## 2024-06-22 PROCEDURE — 82947 ASSAY GLUCOSE BLOOD QUANT: CPT

## 2024-06-22 PROCEDURE — 97162 PT EVAL MOD COMPLEX 30 MIN: CPT

## 2024-06-22 PROCEDURE — 87040 BLOOD CULTURE FOR BACTERIA: CPT

## 2024-06-22 PROCEDURE — 87507 IADNA-DNA/RNA PROBE TQ 12-25: CPT

## 2024-06-22 PROCEDURE — 80053 COMPREHEN METABOLIC PANEL: CPT

## 2024-06-22 PROCEDURE — 82728 ASSAY OF FERRITIN: CPT

## 2024-06-22 PROCEDURE — 84443 ASSAY THYROID STIM HORMONE: CPT

## 2024-06-22 PROCEDURE — 84484 ASSAY OF TROPONIN QUANT: CPT

## 2024-06-22 PROCEDURE — A9585: CPT

## 2024-06-22 PROCEDURE — 87324 CLOSTRIDIUM AG IA: CPT

## 2024-06-22 PROCEDURE — 93005 ELECTROCARDIOGRAM TRACING: CPT

## 2024-06-22 PROCEDURE — 84100 ASSAY OF PHOSPHORUS: CPT

## 2024-06-22 PROCEDURE — 96374 THER/PROPH/DIAG INJ IV PUSH: CPT

## 2024-06-22 PROCEDURE — 81001 URINALYSIS AUTO W/SCOPE: CPT

## 2024-06-22 PROCEDURE — 82435 ASSAY OF BLOOD CHLORIDE: CPT

## 2024-06-22 PROCEDURE — 87086 URINE CULTURE/COLONY COUNT: CPT

## 2024-06-22 PROCEDURE — 72192 CT PELVIS W/O DYE: CPT | Mod: MC

## 2024-06-22 PROCEDURE — 36415 COLL VENOUS BLD VENIPUNCTURE: CPT

## 2024-06-22 RX ORDER — GABAPENTIN 400 MG/1
1 CAPSULE ORAL
Qty: 90 | Refills: 0
Start: 2024-06-22 | End: 2024-07-21

## 2024-06-22 RX ORDER — GABAPENTIN 400 MG/1
1 CAPSULE ORAL
Qty: 0 | Refills: 0 | DISCHARGE
Start: 2024-06-22

## 2024-06-22 RX ORDER — ERGOCALCIFEROL 1.25 MG/1
1 CAPSULE ORAL
Qty: 6 | Refills: 0
Start: 2024-06-22 | End: 2024-08-05

## 2024-06-22 RX ORDER — ATORVASTATIN CALCIUM 80 MG/1
1 TABLET, FILM COATED ORAL
Refills: 0 | DISCHARGE

## 2024-06-22 RX ORDER — SOD PHOS DI, MONO/K PHOS MONO 250 MG
1 TABLET ORAL THREE TIMES A DAY
Refills: 0 | Status: DISCONTINUED | OUTPATIENT
Start: 2024-06-22 | End: 2024-06-22

## 2024-06-22 RX ORDER — SODIUM PHOSPHATE,DIBASIC DIHYD
15 POWDER (GRAM) MISCELLANEOUS ONCE
Refills: 0 | Status: COMPLETED | OUTPATIENT
Start: 2024-06-22 | End: 2024-06-22

## 2024-06-22 RX ORDER — CALCITRIOL 0.5 UG/1
1 CAPSULE, GELATIN COATED ORAL
Qty: 30 | Refills: 0
Start: 2024-06-22 | End: 2024-07-21

## 2024-06-22 RX ORDER — METOPROLOL TARTRATE 50 MG
1 TABLET ORAL
Refills: 0 | DISCHARGE

## 2024-06-22 RX ORDER — ASPIRIN/CALCIUM CARB/MAGNESIUM 324 MG
1 TABLET ORAL
Refills: 0 | DISCHARGE

## 2024-06-22 RX ORDER — METOPROLOL SUCCINATE 50 MG/1
1 TABLET, EXTENDED RELEASE ORAL
Qty: 60 | Refills: 0
Start: 2024-06-22 | End: 2024-07-21

## 2024-06-22 RX ORDER — LENALIDOMIDE 10 MG/1
1 CAPSULE ORAL
Refills: 0 | DISCHARGE

## 2024-06-22 RX ORDER — CALCIUM CARBONATE 750 MG/1
1 TABLET ORAL
Qty: 60 | Refills: 0
Start: 2024-06-22 | End: 2024-07-21

## 2024-06-22 RX ORDER — AMOXICILLIN AND CLAVULANATE POTASSIUM 500; 125 MG/1; MG/1
2 TABLET, FILM COATED ORAL
Refills: 0 | DISCHARGE

## 2024-06-22 RX ORDER — VALSARTAN 80 MG/1
2 TABLET ORAL
Refills: 0 | DISCHARGE

## 2024-06-22 RX ADMIN — CALCIUM CARBONATE 1 TABLET(S): 750 TABLET ORAL at 05:18

## 2024-06-22 RX ADMIN — GABAPENTIN 50 MILLIGRAM(S): 400 CAPSULE ORAL at 12:51

## 2024-06-22 RX ADMIN — Medication 1 APPLICATION(S): at 12:44

## 2024-06-22 RX ADMIN — OXYCODONE HYDROCHLORIDE 10 MILLIGRAM(S): 30 TABLET ORAL at 12:45

## 2024-06-22 RX ADMIN — OXYCODONE HYDROCHLORIDE 10 MILLIGRAM(S): 30 TABLET ORAL at 04:50

## 2024-06-22 RX ADMIN — OXYCODONE HYDROCHLORIDE 10 MILLIGRAM(S): 30 TABLET ORAL at 13:06

## 2024-06-22 RX ADMIN — Medication 81 MILLIGRAM(S): at 12:44

## 2024-06-22 RX ADMIN — METOPROLOL SUCCINATE 25 MILLIGRAM(S): 50 TABLET, EXTENDED RELEASE ORAL at 05:18

## 2024-06-22 RX ADMIN — OXYCODONE HYDROCHLORIDE 10 MILLIGRAM(S): 30 TABLET ORAL at 03:59

## 2024-06-22 RX ADMIN — GABAPENTIN 50 MILLIGRAM(S): 400 CAPSULE ORAL at 05:18

## 2024-06-22 RX ADMIN — Medication 100 MILLIGRAM(S): at 12:44

## 2024-06-22 RX ADMIN — Medication 1 PACKET(S): at 12:50

## 2024-06-22 RX ADMIN — CALCITRIOL 0.5 MICROGRAM(S): 0.5 CAPSULE, GELATIN COATED ORAL at 12:51

## 2024-06-22 RX ADMIN — FINASTERIDE 5 MILLIGRAM(S): 1 TABLET, FILM COATED ORAL at 12:50

## 2024-06-22 RX ADMIN — HEPARIN SODIUM 5000 UNIT(S): 1000 INJECTION INTRAVENOUS; SUBCUTANEOUS at 05:18

## 2024-06-24 LAB
CULTURE RESULTS: SIGNIFICANT CHANGE UP
CULTURE RESULTS: SIGNIFICANT CHANGE UP
SPECIMEN SOURCE: SIGNIFICANT CHANGE UP
SPECIMEN SOURCE: SIGNIFICANT CHANGE UP

## 2024-07-01 ENCOUNTER — APPOINTMENT (OUTPATIENT)
Dept: UROLOGY | Facility: CLINIC | Age: 74
End: 2024-07-01
Payer: MEDICARE

## 2024-07-01 VITALS
DIASTOLIC BLOOD PRESSURE: 73 MMHG | RESPIRATION RATE: 16 BRPM | HEART RATE: 72 BPM | SYSTOLIC BLOOD PRESSURE: 131 MMHG | TEMPERATURE: 97.6 F | OXYGEN SATURATION: 96 %

## 2024-07-01 DIAGNOSIS — N13.30 UNSPECIFIED HYDRONEPHROSIS: ICD-10-CM

## 2024-07-01 DIAGNOSIS — N13.9 OBSTRUCTIVE AND REFLUX UROPATHY, UNSPECIFIED: ICD-10-CM

## 2024-07-01 DIAGNOSIS — R33.9 RETENTION OF URINE, UNSPECIFIED: ICD-10-CM

## 2024-07-01 DIAGNOSIS — N13.8 BENIGN PROSTATIC HYPERPLASIA WITH LOWER URINARY TRACT SYMPMS: ICD-10-CM

## 2024-07-01 DIAGNOSIS — N40.1 BENIGN PROSTATIC HYPERPLASIA WITH LOWER URINARY TRACT SYMPMS: ICD-10-CM

## 2024-07-01 PROCEDURE — G2211 COMPLEX E/M VISIT ADD ON: CPT

## 2024-07-01 PROCEDURE — 99214 OFFICE O/P EST MOD 30 MIN: CPT

## 2024-07-12 ENCOUNTER — INPATIENT (INPATIENT)
Facility: HOSPITAL | Age: 74
LOS: 6 days | Discharge: HOME | DRG: 387 | End: 2024-07-19
Attending: INTERNAL MEDICINE | Admitting: INTERNAL MEDICINE
Payer: MEDICARE

## 2024-07-12 VITALS
HEART RATE: 92 BPM | SYSTOLIC BLOOD PRESSURE: 93 MMHG | HEIGHT: 64 IN | TEMPERATURE: 98 F | RESPIRATION RATE: 18 BRPM | WEIGHT: 130.07 LBS | OXYGEN SATURATION: 96 % | DIASTOLIC BLOOD PRESSURE: 59 MMHG

## 2024-07-12 DIAGNOSIS — K51.00 ULCERATIVE (CHRONIC) PANCOLITIS WITHOUT COMPLICATIONS: ICD-10-CM

## 2024-07-12 DIAGNOSIS — Z98.890 OTHER SPECIFIED POSTPROCEDURAL STATES: Chronic | ICD-10-CM

## 2024-07-12 LAB
ALBUMIN SERPL ELPH-MCNC: 3.3 G/DL — SIGNIFICANT CHANGE UP (ref 3.3–5)
ALP SERPL-CCNC: 54 U/L — SIGNIFICANT CHANGE UP (ref 40–120)
ALT FLD-CCNC: 8 U/L — LOW (ref 10–45)
ANION GAP SERPL CALC-SCNC: 15 MMOL/L — SIGNIFICANT CHANGE UP (ref 5–17)
ANISOCYTOSIS BLD QL: SLIGHT — SIGNIFICANT CHANGE UP
APPEARANCE UR: ABNORMAL
AST SERPL-CCNC: 8 U/L — LOW (ref 10–40)
BACTERIA # UR AUTO: ABNORMAL /HPF
BASE EXCESS BLDV CALC-SCNC: -2.2 MMOL/L — LOW (ref -2–3)
BASOPHILS # BLD AUTO: 0 K/UL — SIGNIFICANT CHANGE UP (ref 0–0.2)
BASOPHILS NFR BLD AUTO: 0 % — SIGNIFICANT CHANGE UP (ref 0–2)
BILIRUB SERPL-MCNC: 0.4 MG/DL — SIGNIFICANT CHANGE UP (ref 0.2–1.2)
BILIRUB UR-MCNC: NEGATIVE — SIGNIFICANT CHANGE UP
BUN SERPL-MCNC: 19 MG/DL — SIGNIFICANT CHANGE UP (ref 7–23)
BURR CELLS BLD QL SMEAR: PRESENT — SIGNIFICANT CHANGE UP
C DIFF GDH STL QL: POSITIVE — SIGNIFICANT CHANGE UP
C DIFF GDH STL QL: SIGNIFICANT CHANGE UP
CA-I SERPL-SCNC: 1.07 MMOL/L — LOW (ref 1.15–1.33)
CALCIUM SERPL-MCNC: 8 MG/DL — LOW (ref 8.4–10.5)
CAST: 4 /LPF — SIGNIFICANT CHANGE UP (ref 0–4)
CHLORIDE BLDV-SCNC: 101 MMOL/L — SIGNIFICANT CHANGE UP (ref 96–108)
CHLORIDE SERPL-SCNC: 98 MMOL/L — SIGNIFICANT CHANGE UP (ref 96–108)
CO2 BLDV-SCNC: 24 MMOL/L — SIGNIFICANT CHANGE UP (ref 22–26)
CO2 SERPL-SCNC: 19 MMOL/L — LOW (ref 22–31)
COLOR SPEC: YELLOW — SIGNIFICANT CHANGE UP
CREAT SERPL-MCNC: 1.33 MG/DL — HIGH (ref 0.5–1.3)
DACRYOCYTES BLD QL SMEAR: SLIGHT — SIGNIFICANT CHANGE UP
DIFF PNL FLD: ABNORMAL
EGFR: 56 ML/MIN/1.73M2 — LOW
ELLIPTOCYTES BLD QL SMEAR: SIGNIFICANT CHANGE UP
EOSINOPHIL # BLD AUTO: 0 K/UL — SIGNIFICANT CHANGE UP (ref 0–0.5)
EOSINOPHIL NFR BLD AUTO: 0 % — SIGNIFICANT CHANGE UP (ref 0–6)
GAS PNL BLDV: 128 MMOL/L — LOW (ref 136–145)
GAS PNL BLDV: SIGNIFICANT CHANGE UP
GI PCR PANEL: SIGNIFICANT CHANGE UP
GIANT PLATELETS BLD QL SMEAR: PRESENT — SIGNIFICANT CHANGE UP
GLUCOSE BLDV-MCNC: 104 MG/DL — HIGH (ref 70–99)
GLUCOSE SERPL-MCNC: 108 MG/DL — HIGH (ref 70–99)
GLUCOSE UR QL: NEGATIVE MG/DL — SIGNIFICANT CHANGE UP
HCO3 BLDV-SCNC: 22 MMOL/L — SIGNIFICANT CHANGE UP (ref 22–29)
HCT VFR BLD CALC: 34.1 % — LOW (ref 39–50)
HCT VFR BLDA CALC: 33 % — LOW (ref 39–51)
HGB BLD CALC-MCNC: 11.1 G/DL — LOW (ref 12.6–17.4)
HGB BLD-MCNC: 10.8 G/DL — LOW (ref 13–17)
KETONES UR-MCNC: ABNORMAL MG/DL
LACTATE BLDV-MCNC: 1.4 MMOL/L — SIGNIFICANT CHANGE UP (ref 0.5–2)
LEUKOCYTE ESTERASE UR-ACNC: ABNORMAL
LIDOCAIN IGE QN: 14 U/L — SIGNIFICANT CHANGE UP (ref 7–60)
LYMPHOCYTES # BLD AUTO: 0.87 K/UL — LOW (ref 1–3.3)
LYMPHOCYTES # BLD AUTO: 22.8 % — SIGNIFICANT CHANGE UP (ref 13–44)
MAGNESIUM SERPL-MCNC: 1.7 MG/DL — SIGNIFICANT CHANGE UP (ref 1.6–2.6)
MANUAL SMEAR VERIFICATION: SIGNIFICANT CHANGE UP
MCHC RBC-ENTMCNC: 29 PG — SIGNIFICANT CHANGE UP (ref 27–34)
MCHC RBC-ENTMCNC: 31.7 GM/DL — LOW (ref 32–36)
MCV RBC AUTO: 91.4 FL — SIGNIFICANT CHANGE UP (ref 80–100)
MONOCYTES # BLD AUTO: 1.34 K/UL — HIGH (ref 0–0.9)
MONOCYTES NFR BLD AUTO: 35.1 % — HIGH (ref 2–14)
NEUTROPHILS # BLD AUTO: 1.61 K/UL — LOW (ref 1.8–7.4)
NEUTROPHILS NFR BLD AUTO: 34.2 % — LOW (ref 43–77)
NEUTS BAND # BLD: 7.9 % — SIGNIFICANT CHANGE UP (ref 0–8)
NITRITE UR-MCNC: NEGATIVE — SIGNIFICANT CHANGE UP
PCO2 BLDV: 37 MMHG — LOW (ref 42–55)
PH BLDV: 7.39 — SIGNIFICANT CHANGE UP (ref 7.32–7.43)
PH UR: 5.5 — SIGNIFICANT CHANGE UP (ref 5–8)
PHOSPHATE SERPL-MCNC: 1.8 MG/DL — LOW (ref 2.5–4.5)
PLAT MORPH BLD: NORMAL — SIGNIFICANT CHANGE UP
PLATELET # BLD AUTO: 254 K/UL — SIGNIFICANT CHANGE UP (ref 150–400)
PO2 BLDV: 35 MMHG — SIGNIFICANT CHANGE UP (ref 25–45)
POIKILOCYTOSIS BLD QL AUTO: SIGNIFICANT CHANGE UP
POTASSIUM BLDV-SCNC: 4.4 MMOL/L — SIGNIFICANT CHANGE UP (ref 3.5–5.1)
POTASSIUM SERPL-MCNC: 3.9 MMOL/L — SIGNIFICANT CHANGE UP (ref 3.5–5.3)
POTASSIUM SERPL-SCNC: 3.9 MMOL/L — SIGNIFICANT CHANGE UP (ref 3.5–5.3)
PROT SERPL-MCNC: 6 G/DL — SIGNIFICANT CHANGE UP (ref 6–8.3)
PROT UR-MCNC: 100 MG/DL
RBC # BLD: 3.73 M/UL — LOW (ref 4.2–5.8)
RBC # FLD: 15.6 % — HIGH (ref 10.3–14.5)
RBC BLD AUTO: ABNORMAL
RBC CASTS # UR COMP ASSIST: 1 /HPF — SIGNIFICANT CHANGE UP (ref 0–4)
REVIEW: SIGNIFICANT CHANGE UP
SAO2 % BLDV: 55.2 % — LOW (ref 67–88)
SCHISTOCYTES BLD QL AUTO: SIGNIFICANT CHANGE UP
SODIUM SERPL-SCNC: 132 MMOL/L — LOW (ref 135–145)
SP GR SPEC: 1.02 — SIGNIFICANT CHANGE UP (ref 1–1.03)
SQUAMOUS # UR AUTO: 3 /HPF — SIGNIFICANT CHANGE UP (ref 0–5)
UROBILINOGEN FLD QL: 0.2 MG/DL — SIGNIFICANT CHANGE UP (ref 0.2–1)
WBC # BLD: 3.83 K/UL — SIGNIFICANT CHANGE UP (ref 3.8–10.5)
WBC # FLD AUTO: 3.83 K/UL — SIGNIFICANT CHANGE UP (ref 3.8–10.5)
WBC UR QL: 360 /HPF — HIGH (ref 0–5)

## 2024-07-12 PROCEDURE — 99053 MED SERV 10PM-8AM 24 HR FAC: CPT

## 2024-07-12 PROCEDURE — 99222 1ST HOSP IP/OBS MODERATE 55: CPT

## 2024-07-12 PROCEDURE — 71045 X-RAY EXAM CHEST 1 VIEW: CPT | Mod: 26

## 2024-07-12 PROCEDURE — 74177 CT ABD & PELVIS W/CONTRAST: CPT | Mod: 26,MC

## 2024-07-12 PROCEDURE — 99285 EMERGENCY DEPT VISIT HI MDM: CPT

## 2024-07-12 RX ORDER — GABAPENTIN
100 POWDER (GRAM) MISCELLANEOUS THREE TIMES A DAY
Refills: 0 | Status: DISCONTINUED | OUTPATIENT
Start: 2024-07-12 | End: 2024-07-19

## 2024-07-12 RX ORDER — ALLOPURINOL 300 MG/1
100 TABLET ORAL DAILY
Refills: 0 | Status: DISCONTINUED | OUTPATIENT
Start: 2024-07-12 | End: 2024-07-19

## 2024-07-12 RX ORDER — SODIUM CHLORIDE 0.9 % (FLUSH) 0.9 %
1000 SYRINGE (ML) INJECTION
Refills: 0 | Status: DISCONTINUED | OUTPATIENT
Start: 2024-07-12 | End: 2024-07-15

## 2024-07-12 RX ORDER — ACETAMINOPHEN 325 MG
1000 TABLET ORAL ONCE
Refills: 0 | Status: COMPLETED | OUTPATIENT
Start: 2024-07-12 | End: 2024-07-12

## 2024-07-12 RX ORDER — CEFTRIAXONE SODIUM 500 MG
1000 VIAL (EA) INJECTION ONCE
Refills: 0 | Status: COMPLETED | OUTPATIENT
Start: 2024-07-12 | End: 2024-07-12

## 2024-07-12 RX ORDER — DEXTROSE MONOHYDRATE AND SODIUM CHLORIDE 5; .3 G/100ML; G/100ML
500 INJECTION, SOLUTION INTRAVENOUS ONCE
Refills: 0 | Status: COMPLETED | OUTPATIENT
Start: 2024-07-12 | End: 2024-07-12

## 2024-07-12 RX ORDER — CALCIUM CARBONATE 500(1250)
1 TABLET,CHEWABLE ORAL DAILY
Refills: 0 | Status: DISCONTINUED | OUTPATIENT
Start: 2024-07-12 | End: 2024-07-19

## 2024-07-12 RX ORDER — FIDAXOMICIN 200 MG/5ML
200 GRANULE, FOR SUSPENSION ORAL
Refills: 0 | Status: DISCONTINUED | OUTPATIENT
Start: 2024-07-12 | End: 2024-07-19

## 2024-07-12 RX ORDER — ACETAMINOPHEN 325 MG
650 TABLET ORAL EVERY 6 HOURS
Refills: 0 | Status: DISCONTINUED | OUTPATIENT
Start: 2024-07-12 | End: 2024-07-19

## 2024-07-12 RX ORDER — ASPIRIN 325 MG/1
81 TABLET, FILM COATED ORAL DAILY
Refills: 0 | Status: DISCONTINUED | OUTPATIENT
Start: 2024-07-12 | End: 2024-07-19

## 2024-07-12 RX ORDER — METOPROLOL TARTRATE 50 MG
50 TABLET ORAL DAILY
Refills: 0 | Status: DISCONTINUED | OUTPATIENT
Start: 2024-07-12 | End: 2024-07-19

## 2024-07-12 RX ORDER — TAMSULOSIN HYDROCHLORIDE 0.4 MG/1
0.4 CAPSULE ORAL AT BEDTIME
Refills: 0 | Status: DISCONTINUED | OUTPATIENT
Start: 2024-07-12 | End: 2024-07-19

## 2024-07-12 RX ORDER — VANCOMYCIN HYDROCHLORIDE 50 MG/ML
125 KIT ORAL EVERY 6 HOURS
Refills: 0 | Status: DISCONTINUED | OUTPATIENT
Start: 2024-07-12 | End: 2024-07-12

## 2024-07-12 RX ORDER — OXYCODONE HYDROCHLORIDE 100 MG/5ML
10 SOLUTION ORAL EVERY 6 HOURS
Refills: 0 | Status: DISCONTINUED | OUTPATIENT
Start: 2024-07-12 | End: 2024-07-19

## 2024-07-12 RX ADMIN — Medication 1 TABLET(S): at 12:35

## 2024-07-12 RX ADMIN — TAMSULOSIN HYDROCHLORIDE 0.4 MILLIGRAM(S): 0.4 CAPSULE ORAL at 21:50

## 2024-07-12 RX ADMIN — Medication 100 MILLIGRAM(S): at 10:39

## 2024-07-12 RX ADMIN — Medication 100 MILLIGRAM(S): at 18:07

## 2024-07-12 RX ADMIN — OXYCODONE HYDROCHLORIDE 10 MILLIGRAM(S): 100 SOLUTION ORAL at 23:00

## 2024-07-12 RX ADMIN — Medication 650 MILLIGRAM(S): at 17:53

## 2024-07-12 RX ADMIN — Medication 1000 MILLIGRAM(S): at 12:01

## 2024-07-12 RX ADMIN — FIDAXOMICIN 200 MILLIGRAM(S): 200 GRANULE, FOR SUSPENSION ORAL at 17:54

## 2024-07-12 RX ADMIN — VANCOMYCIN HYDROCHLORIDE 125 MILLIGRAM(S): KIT at 13:44

## 2024-07-12 RX ADMIN — Medication 5 MILLIGRAM(S): at 12:35

## 2024-07-12 RX ADMIN — ASPIRIN 81 MILLIGRAM(S): 325 TABLET, FILM COATED ORAL at 10:10

## 2024-07-12 RX ADMIN — Medication 400 MILLIGRAM(S): at 10:09

## 2024-07-12 RX ADMIN — Medication 60 MILLILITER(S): at 17:53

## 2024-07-12 RX ADMIN — Medication 100 MILLIGRAM(S): at 21:51

## 2024-07-12 RX ADMIN — Medication 100 MILLIGRAM(S): at 10:09

## 2024-07-12 RX ADMIN — DEXTROSE MONOHYDRATE AND SODIUM CHLORIDE 500 MILLILITER(S): 5; .3 INJECTION, SOLUTION INTRAVENOUS at 08:09

## 2024-07-12 RX ADMIN — OXYCODONE HYDROCHLORIDE 10 MILLIGRAM(S): 100 SOLUTION ORAL at 21:51

## 2024-07-12 RX ADMIN — ALLOPURINOL 100 MILLIGRAM(S): 300 TABLET ORAL at 12:35

## 2024-07-12 RX ADMIN — Medication 50 MILLIGRAM(S): at 10:10

## 2024-07-12 RX ADMIN — Medication 650 MILLIGRAM(S): at 18:36

## 2024-07-12 NOTE — H&P ADULT - PATIENT'S PREFERRED PRONOUN
FONT COLOR=\"#[id number]\">SIRENA EDUARDO  : 1958  05/07/18 14:10:49  ACCOUNT:  [id number]  HOME PHONE:  [phone]  WORK PHONE:  [phone]    Spoke with patient wife. Informed results have not been received. Per  to discuss Hm results at  ov. Advised if HM results abnormal will review with , otherwise agreeable to hold BB until  ov . KL    pt's wife asked if Dr. Berumen recommended any medication changes based on holter results  Results were faxed from [place]. Pt has prescription for metoprolol from Dr. Anderson in [place], but pt has not started taking it yet. [phone].       Him/He

## 2024-07-12 NOTE — CONSULT NOTE ADULT - NS ATTEND OPT1 GEN_ALL_CORE
Patient is a 37y old  Female who presents with a chief complaint of acid reflux    HPI:  37F Guyanese speaking w/ T1DM (dx ) p/w worsening acid reflux, found to have hypoglycemia.  patient reports no complications of diabetes  has been checking FS 3x daily but reports hypoglycemia to 30s. ranges from 30s-200s  this AM FS was 45  had breakfast this morning with sandwich and oatmeal and took 50 units of admelog  takes basaglar 40 units QHS and admelog 50 units before each meal  injects insulin in R arm, does not use abdomen for injections  unclear if patient sees endocrinologist for diabetes  saw eye doctor this year with dilation, no DR  FS in ER was 31 and patient had sandwich and 2 juice- repeat FS 61    PAST MEDICAL & SURGICAL HISTORY:  Diabetes  S/P  section: x3      SH: lives with family at home. no smoking/EtOH    FAMILY HISTORY:  unremarkable    Allergies    No Known Allergies    Intolerances        REVIEW OF SYSTEMS:    CONSTITUTIONAL: No fever, weight loss, or fatigue  EYES: No eye pain, visual disturbances, or discharge  ENMT:  No difficulty hearing, tinnitus, vertigo; No sinus or throat pain  NECK: No pain or stiffness  RESPIRATORY: No cough, wheezing, chills or hemoptysis; No shortness of breath  CARDIOVASCULAR: No chest pain, palpitations, dizziness, or leg swelling  GASTROINTESTINAL: No abdominal or epigastric pain. No nausea, vomiting, or hematemesis; No diarrhea or constipation. No melena or hematochezia.  NEUROLOGICAL: No headaches, memory loss, loss of strength, numbness, or tremors  SKIN: No itching, burning, rashes, or lesions   MUSCULOSKELETAL: No joint pain or swelling; No muscle, back, or extremity pain  PSYCHIATRIC: No depression, anxiety, mood swings, or difficulty sleeping        MEDICATIONS  (STANDING):  dextrose 5%. 1000 milliLiter(s) (50 mL/Hr) IV Continuous <Continuous>  dextrose 50% Injectable 12.5 Gram(s) IV Push once  dextrose 50% Injectable 25 Gram(s) IV Push once  dextrose 50% Injectable 25 Gram(s) IV Push once  insulin glargine Injectable (LANTUS) 16 Unit(s) SubCutaneous once  insulin lispro (HumaLOG) corrective regimen sliding scale   SubCutaneous three times a day before meals  insulin lispro (HumaLOG) corrective regimen sliding scale   SubCutaneous at bedtime    MEDICATIONS  (PRN):  acetaminophen   Tablet .. 650 milliGRAM(s) Oral every 6 hours PRN Mild Pain (1 - 3)  dextrose 40% Gel 15 Gram(s) Oral once PRN Blood Glucose LESS THAN 70 milliGRAM(s)/deciliter  glucagon  Injectable 1 milliGRAM(s) IntraMuscular once PRN Glucose LESS THAN 70 milligrams/deciliter      Vital Signs Last 24 Hrs  T(C): 36.6 (15 May 2019 13:24), Max: 36.6 (15 May 2019 13:24)  T(F): 97.8 (15 May 2019 13:24), Max: 97.8 (15 May 2019 13:24)  HR: 83 (15 May 2019 13:24) (83 - 83)  BP: 144/85 (15 May 2019 13:24) (144/85 - 144/85)  BP(mean): --  RR: 16 (15 May 2019 13:24) (16 - 16)  SpO2: 99% (15 May 2019 13:24) (99% - 99%)      PHYSICAL EXAM:    Constitutional: NAD, well-groomed, well-developed, thin  HEENT: EOMI, no exophalmos  Neck: trachea midline, no thyroid enlargement  Respiratory: CTAB  Cardiovascular: S1 and S2, RRR  Gastrointestinal: BS+, soft, ntnd  Extremities: No peripheral edema  Neurological: A/O x 3, no focal deficits  Psychiatric: Normal mood, normal affect  Skin: No rashes, no acanthosis        LABS                              9.3    5.3   )-----------( 366      ( 15 May 2019 14:21 )             33.7           Hemoglobin A1C, Whole Blood: 9.1 % (15 @ 14:21)          CAPILLARY BLOOD GLUCOSE      POCT Blood Glucose.: 64 mg/dL (15 May 2019 15:06)  POCT Blood Glucose.: 31 mg/dL (15 May 2019 13:49)  POCT Blood Glucose.: 31 mg/dL (15 May 2019 13:48) I attest my time as attending is greater than 50% of the total combined time spent on qualifying patient care activities by the PA/NP and attending.

## 2024-07-12 NOTE — ED PROVIDER NOTE - PROGRESS NOTE DETAILS
Discussed with Dr. ANTONY Rob (covering [facility]) TBA  Juventino Rodríguez MD, Facep

## 2024-07-12 NOTE — ED PROVIDER NOTE - CCCP TRG CHIEF CMPLNT
Patient was in [facility] diarrhea

## 2024-07-12 NOTE — CONSULT NOTE ADULT - ASSESSMENT
73-year-old male with past medical history of multiple myeloma on chemo, anemia, BPH, hypertension, hyperlipidemia, gout presenting with diarrhea.    Overall severe sepsis, fever, tachycardia, bandemia, sepsis due to C diff colitis. AGUS.   Abnormal U/A but pt denies any symptoms, self straight caths intermittently.   Immunocompromised state.      PLAN:   started on dificid, given pt at high risk for recurrence,  follow up blood cx  stool cx in lab  GI PCR in lab.   abnormal U/A, urine cx in lab, given active c diff colitis, unless change in clinical status not planning to treat even if urine cx positive.      Plan discussed with Medicine ACP.     Mary Garcia  Please contact through MS Teams   If no response or past 5 pm/weekend call [phone].     My colleague will cover starting 7/13- 7/28.

## 2024-07-12 NOTE — ED PROVIDER NOTE - OBJECTIVE STATEMENT
73-year-old male with past medical history of multiple myeloma on chemo, anemia, BPH, hypertension, hyperlipidemia, gout presenting with diarrhea.  Patient reports that he has had intermittent episodes of diarrhea for the past 6 weeks.  Patient was admitted to [facility] a month ago for UTI and hematuria.  During that admission C. difficile was negative x 2.  Patient states over the past few days diarrhea has progressively worsened.  Patient endorsing 6 episodes of watery diarrhea since last evening.  Patient states that he has been feeling generally weak and fatigued.  A few days ago he was able to work with physical therapy and was being scheduled to have a hip replacement, but today he is unable to even get out of bed secondary to fatigue.  Patient also endorsing fevers with Tmax of 101 yesterday.  Otherwise denies chest pain, shortness of breath, abdominal pain, vomiting, but does endorse poor appetite.

## 2024-07-12 NOTE — H&P ADULT - IN ACCORDANCE WITH [place] STATE LAW, WE OFFER EVERY PATIENT A HEPATITIS C TEST. WOULD YOU LIKE TO BE TESTED TODAY?
How Severe Are Your Spot(S)?: severe Have Your Spot(S) Been Treated In The Past?: has not been treated Hpi Title: Evaluation of Skin Lesions Opt out

## 2024-07-12 NOTE — CONSULT NOTE ADULT - SUBJECTIVE AND OBJECTIVE BOX
Patient is a 73y old  Male who presents with a chief complaint of hypotention (2024 09:22)      HPI:  73-year-old male with past medical history of multiple myeloma on chemo, anemia, BPH, hypertension, hyperlipidemia, gout presenting with diarrhea.  Patient reports that he has had intermittent episodes of "diarrhea" (described as loose, but not watery) for the past 6 weeks.  Patient was admitted to [facility] a month ago for UTI and hematuria.  During that admission C. difficile was negative x 2.  Patient states over the past few days diarrhea has progressively worsened.  Patient endorsing 6 episodes of watery diarrhea since last evening.  Patient states that he has been feeling generally weak and fatigued.  A few days ago he was able to work with physical therapy and was being scheduled to have a hip replacement, but today he is unable to even get out of bed secondary to fatigue.  Patient also endorsing fevers with Tmax of 101 at home yesterday - again febrile to T101F in ED this morning.  Otherwise denies chest pain, shortness of breath, abdominal pain, vomiting, but does endorse poor appetite.    Over past week pt w/ poos appetite but no nausea or vomiting - tolerating mostly liquids (also took soups, noodles and Ensure 2/day)  Yesterday reported to have profuse watery dairrhea with mucus; no bleeding or melena ~6-8 episodes in 12 hours along with associated fever for which pt presented to ED  no abdominal pain   +fever (Tm 101F)  followed by Hem-Onc, on chemo for MM  scheduled for elective THR Monday  NO outpt Abx since last discharge    GI consulted for diarrhea w/ fever and now noted to have CT finding of pancolitis    no CP or SOB  no dizziness or syncope  no focal weakness  no current hematuria    s/p dose of IV CTX and IV Tylenol in ED this AM  no BM since arrival to ED - stool studies pending collection    PAST MEDICAL & SURGICAL HISTORY:  Hypertension  Hyperlipidemia  Multiple myeloma  Calculus of ureter  BPH (benign prostatic hyperplasia)  Lumbar stenosis  History of osteoporosis  Arthritis  Anemia  CAD, multiple vessel  History of colonoscopy  AOCD        Allergies  No Known Drug Allergies  walnut (Faint; Urticaria; Anaphylaxis (Severe); Short breath; Hives; Headache; Rash)    MEDICATIONS  (STANDING):  allopurinol 100 milliGRAM(s) Oral daily  aspirin enteric coated 81 milliGRAM(s) Oral daily  calcium carbonate   1250 mG (OsCal) 1 Tablet(s) Oral daily  finasteride 5 milliGRAM(s) Oral daily  gabapentin Solution 100 milliGRAM(s) Oral three times a day  metoprolol succinate ER 50 milliGRAM(s) Oral daily  tamsulosin 0.4 milliGRAM(s) Oral at bedtime    MEDICATIONS  (PRN):      Social History:    lives with spouse  no toxic habits reported       Family History   IBD (  ) Yes   (X  ) No  GI Malignancy (  )  Yes    ( X ) No  Family history of ovarian cancer (Mother)  Family history of myocardial infarction (Father)      Health Management  Last Colonoscopy - ~20 years ago reported negative per pt and spouse      Advanced Directives: ( X    ) None    (      ) DNR    (     ) DNI    (     ) Health Care Proxy:     Review of Systems:  see HPI- remainder 10 point ROS negative      Vital Signs Last 24 Hrs  T(C): 38.3 (2024 08:35), Max: 38.3 (2024 08:35)  T(F): 101 (2024 08:35), Max: 101 (2024 08:35)  HR: 102 (2024 08:11) (92 - 102)  BP: 112/67 (2024 08:11) (93/59 - 112/67)  BP(mean): 79 (2024 08:11) (79 - 79)  RR: 17 (2024 08:11) (17 - 18)  SpO2: 100% (2024 08:11) (96% - 100%)    Parameters below as of 2024 08:11  Patient On (Oxygen Delivery Method): room air        PHYSICAL EXAM:    Constitutional: NAD, chronically ill appearing elderly WM  non toxic appearing; alert and responsive. Spouse at bedside  Neck: No LAD, supple no JVD  Respiratory: grossly clear bl  no inc WOB  Cardiovascular: S1 and S2, tachy  Gastrointestinal: BS+, soft ND,  NT no rebound guarding or rigidity  Extremities: No peripheral edema, neg clubbing, cyanosis  Vascular: 2+ peripheral pulses  Neurological: A/O x 3, no focal asymmetry  Psychiatric: Normal mood, normal affect  Skin: No rashes, anicteric      LABS:                        10.8   3.83  )-----------( 254      ( 2024 07:52 )             34.1   Band Neutrophils %: 7.9 % (24 @ 07:52)         132<L>  |  98  |  19  ----------------------------<  108<H>  3.9   |  19<L>  |  1.33<H>    Ca    8.0<L>      2024 07:52  Phos  1.8       Mg     1.7         TPro  6.0  /  Alb  3.3  /  TBili  0.4  /  DBili  x   /  AST  8<L>  /  ALT  8<L>  /  AlkPhos  54      Lipase: 14 U/L (24 @ 07:52)     Blood Gas Venous - Lactate: 1.4 mmol/L (24 @ 07:40)       Urinalysis Basic - ( 2024 08:16 )    Color: Yellow / Appearance: Cloudy / S.021 / pH: x  Gluc: x / Ketone: Trace mg/dL  / Bili: Negative / Urobili: 0.2 mg/dL   Blood: x / Protein: 100 mg/dL / Nitrite: Negative   Leuk Esterase: Moderate / RBC: 1 /HPF /  /HPF   Sq Epi: x / Non Sq Epi: 3 /HPF / Bacteria: Many /HPF        RADIOLOGY & ADDITIONAL TESTS:  ACC: [id number] EXAM:  CT ABDOMEN AND PELVIS IC   ORDERED BY: KEO MARCOS   PROCEDURE DATE:  2024        INTERPRETATION:  CLINICAL INFORMATION: Abdominal pain, diarrhea.    COMPARISON: CT 2024    CONTRAST/COMPLICATIONS:  IV Contrast: Omnipaque 350  90 cc administered   10 cc discarded  Oral Contrast: NONE  Complications: None reported at time of study completion    PROCEDURE:  CT of the Abdomen and Pelvis was performed.  Sagittal and coronal reformats were performed.    FINDINGS:  LOWER CHEST: Coronary artery calcification.    LIVER: A 1.4 cm indeterminate lesion in the left hepatic lobe unchanged   from prior imaging dating to at least 2024.  BILE DUCTS:  Linear atelectatic  GALLBLADDER: Within normal limits.  SPLEEN: Within normal limits.  PANCREAS: Within normal limits.  ADRENALS: Within normal limits.  KIDNEYS/URETERS: Bilateral renal cysts. Atrophic left kidney with mild   left hydroureteronephrosis to the level of the urinary bladder, unchanged.    BLADDER: Mildly distended with diffuse wall thickening consistent with   chronic outlet obstruction. Diverticula at the right bladder base.  REPRODUCTIVE ORGANS: Enlarged prostate.    BOWEL: Diffuse colonic wall thickening consistent with colitis. No bowel   obstruction. Appendix is normal.  PERITONEUM/RETROPERITONEUM: Within normal limits.  VESSELS: Atherosclerotic changes. Celiac axis, SMA and J LUIS are patent.  LYMPH NODES: No lymphadenopathy.  ABDOMINAL WALL: Within normal limits.  BONES: Degenerative changes. Several mild lumbar compression deformities.    IMPRESSION:  Pancolitis, infectious or inflammatory.    Enlarged prostate with evidence of chronic bladder outlet obstruction.    Mild left hydronephrosis to the level of the urinary bladder, unchanged.    A 1.4 cm indeterminate left hepatic lobe lesion stable in size from prior   imaging dating to 2024. If warranted this may be further   evaluated with contrast-enhanced abdominal MRI.      ACC: [id number] EXAM:  XR CHEST AP OR PA 1V   ORDERED BY: KEO MARCOS   PROCEDURE DATE:  2024      INTERPRETATION:  CLINICAL INDICATION:  generalized weakness    COMPARISON: Chest radiograph dated 2022    TECHNIQUE: Frontal radiograph of the chest.    FINDINGS:    Cardiac/mediastinum/hilum: Heart size is within normal limits.    Lung parenchyma/Pleura: The lungs are clear. There is no pleural   effusion. There is no pneumothorax.    Skeleton/soft tissues: No acute osseous abnormalities.    IMPRESSION:    Clear lungs.    --- End of Report ---           Patient is a 73y old  Male who presents with a chief complaint of hypotention (2024 09:22)      HPI:  73-year-old male with past medical history of multiple myeloma on chemo, anemia, BPH, hypertension, hyperlipidemia, gout presenting with diarrhea.  Patient reports that he has had intermittent episodes of "diarrhea" (described as loose, but not watery) for the past 6 weeks.  Patient was admitted to [facility] a month ago for UTI and hematuria.  During that admission C. difficile was negative x 2.  Patient states over the past few days diarrhea has progressively worsened.  Patient endorsing 6 episodes of watery diarrhea since last evening.  Patient states that he has been feeling generally weak and fatigued.  A few days ago he was able to work with physical therapy and was being scheduled to have a hip replacement, but today he is unable to even get out of bed secondary to fatigue.  Patient also endorsing fevers with Tmax of 101 at home yesterday - again febrile to T101F in ED this morning.  Otherwise denies chest pain, shortness of breath, abdominal pain, vomiting, but does endorse poor appetite.    Over past week pt w/ poor appetite but no nausea or vomiting - tolerating mostly liquids (also took soups, noodles and Ensure 2/day)  Yesterday reported to have profuse watery diarrhea with mucus; no bleeding or melena ~6-8 episodes in 12 hours; from 5pm-5am today along with associated fever for which pt presented to ED  no abdominal pain   +fever (Tm 101F)  followed by Hem-Onc, on chemo for MM  scheduled for elective THR Monday  NO outpt Abx since last discharge    GI consulted for diarrhea w/ fever and now noted to have CT finding of pancolitis    no CP or SOB  no dizziness or syncope  no focal weakness  no current hematuria    s/p dose of IV CTX and IV Tylenol in ED this AM  no BM since arrival to ED - stool studies pending collection    PAST MEDICAL & SURGICAL HISTORY:  Hypertension  Hyperlipidemia  Multiple myeloma  Calculus of ureter  BPH (benign prostatic hyperplasia)  Lumbar stenosis  History of osteoporosis  Arthritis  Anemia  CAD, multiple vessel  History of colonoscopy  AOCD        Allergies  No Known Drug Allergies  walnut (Faint; Urticaria; Anaphylaxis (Severe); Short breath; Hives; Headache; Rash)    MEDICATIONS  (STANDING):  allopurinol 100 milliGRAM(s) Oral daily  aspirin enteric coated 81 milliGRAM(s) Oral daily  calcium carbonate   1250 mG (OsCal) 1 Tablet(s) Oral daily  finasteride 5 milliGRAM(s) Oral daily  gabapentin Solution 100 milliGRAM(s) Oral three times a day  metoprolol succinate ER 50 milliGRAM(s) Oral daily  tamsulosin 0.4 milliGRAM(s) Oral at bedtime    MEDICATIONS  (PRN):      Social History:    lives with spouse  no toxic habits reported       Family History   IBD (  ) Yes   (X  ) No  GI Malignancy (  )  Yes    ( X ) No  Family history of ovarian cancer (Mother)  Family history of myocardial infarction (Father)      Health Management  Last Colonoscopy - ~20 years ago reported negative per pt and spouse      Advanced Directives: ( X    ) None    (      ) DNR    (     ) DNI    (     ) Health Care Proxy:     Review of Systems:  see HPI- remainder 10 point ROS negative      Vital Signs Last 24 Hrs  T(C): 38.3 (2024 08:35), Max: 38.3 (2024 08:35)  T(F): 101 (2024 08:35), Max: 101 (2024 08:35)  HR: 102 (2024 08:11) (92 - 102)  BP: 112/67 (2024 08:11) (93/59 - 112/67)  BP(mean): 79 (2024 08:11) (79 - 79)  RR: 17 (2024 08:11) (17 - 18)  SpO2: 100% (2024 08:11) (96% - 100%)    Parameters below as of 2024 08:11  Patient On (Oxygen Delivery Method): room air        PHYSICAL EXAM:    Constitutional: NAD, chronically ill appearing elderly WM  non toxic appearing; alert and responsive. Spouse at bedside  Neck: No LAD, supple no JVD  Respiratory: grossly clear bl  no inc WOB  Cardiovascular: S1 and S2, tachy  Gastrointestinal: BS+, soft ND,  NT no rebound guarding or rigidity  Extremities: No peripheral edema, neg clubbing, cyanosis  Vascular: 2+ peripheral pulses  Neurological: A/O x 3, no focal asymmetry  Psychiatric: Normal mood, normal affect  Skin: No rashes, anicteric      LABS:                        10.8   3.83  )-----------( 254      ( 2024 07:52 )             34.1   Band Neutrophils %: 7.9 % (07.12.24 @ 07:52)         132<L>  |  98  |  19  ----------------------------<  108<H>  3.9   |  19<L>  |  1.33<H>    Ca    8.0<L>      2024 07:52  Phos  1.8       Mg     1.7         TPro  6.0  /  Alb  3.3  /  TBili  0.4  /  DBili  x   /  AST  8<L>  /  ALT  8<L>  /  AlkPhos  54      Lipase: 14 U/L (24 @ 07:52)     Blood Gas Venous - Lactate: 1.4 mmol/L (24 @ 07:40)       Urinalysis Basic - ( 2024 08:16 )    Color: Yellow / Appearance: Cloudy / S.021 / pH: x  Gluc: x / Ketone: Trace mg/dL  / Bili: Negative / Urobili: 0.2 mg/dL   Blood: x / Protein: 100 mg/dL / Nitrite: Negative   Leuk Esterase: Moderate / RBC: 1 /HPF /  /HPF   Sq Epi: x / Non Sq Epi: 3 /HPF / Bacteria: Many /HPF        RADIOLOGY & ADDITIONAL TESTS:  ACC: [id number] EXAM:  CT ABDOMEN AND PELVIS IC   ORDERED BY: KEO MARCOS   PROCEDURE DATE:  2024        INTERPRETATION:  CLINICAL INFORMATION: Abdominal pain, diarrhea.    COMPARISON: CT 2024    CONTRAST/COMPLICATIONS:  IV Contrast: Omnipaque 350  90 cc administered   10 cc discarded  Oral Contrast: NONE  Complications: None reported at time of study completion    PROCEDURE:  CT of the Abdomen and Pelvis was performed.  Sagittal and coronal reformats were performed.    FINDINGS:  LOWER CHEST: Coronary artery calcification.    LIVER: A 1.4 cm indeterminate lesion in the left hepatic lobe unchanged   from prior imaging dating to at least 2024.  BILE DUCTS:  Linear atelectatic  GALLBLADDER: Within normal limits.  SPLEEN: Within normal limits.  PANCREAS: Within normal limits.  ADRENALS: Within normal limits.  KIDNEYS/URETERS: Bilateral renal cysts. Atrophic left kidney with mild   left hydroureteronephrosis to the level of the urinary bladder, unchanged.    BLADDER: Mildly distended with diffuse wall thickening consistent with   chronic outlet obstruction. Diverticula at the right bladder base.  REPRODUCTIVE ORGANS: Enlarged prostate.    BOWEL: Diffuse colonic wall thickening consistent with colitis. No bowel   obstruction. Appendix is normal.  PERITONEUM/RETROPERITONEUM: Within normal limits.  VESSELS: Atherosclerotic changes. Celiac axis, SMA and J LUIS are patent.  LYMPH NODES: No lymphadenopathy.  ABDOMINAL WALL: Within normal limits.  BONES: Degenerative changes. Several mild lumbar compression deformities.    IMPRESSION:  Pancolitis, infectious or inflammatory.    Enlarged prostate with evidence of chronic bladder outlet obstruction.    Mild left hydronephrosis to the level of the urinary bladder, unchanged.    A 1.4 cm indeterminate left hepatic lobe lesion stable in size from prior   imaging dating to 2024. If warranted this may be further   evaluated with contrast-enhanced abdominal MRI.      ACC: [id number] EXAM:  XR CHEST AP OR PA 1V   ORDERED BY: KEO MARCOS   PROCEDURE DATE:  2024      INTERPRETATION:  CLINICAL INDICATION:  generalized weakness    COMPARISON: Chest radiograph dated 2022    TECHNIQUE: Frontal radiograph of the chest.    FINDINGS:    Cardiac/mediastinum/hilum: Heart size is within normal limits.    Lung parenchyma/Pleura: The lungs are clear. There is no pleural   effusion. There is no pneumothorax.    Skeleton/soft tissues: No acute osseous abnormalities.    IMPRESSION:    Clear lungs.    --- End of Report ---

## 2024-07-12 NOTE — ED PROVIDER NOTE - PHYSICAL EXAMINATION
Gen: AAO x 3, lethargic,  Skin: No rashes or lesions  HEENT: NC/AT, PERRLA, EOMI, MMM  Resp: unlabored CTAB  Cardiac: rrr s1s2, no murmurs, rubs or gallops  GI: ND, +BS, Soft, NT  Ext: no pedal edema, FROM in all extremities  Neuro: no focal deficits

## 2024-07-12 NOTE — ED ADULT NURSE NOTE - NSFALLRISKINTERV_ED_ALL_ED

## 2024-07-12 NOTE — ED ADULT NURSE NOTE - OBJECTIVE STATEMENT
Patient BIBEMS from home c/o diarrhea and generalized weakness. Patient states it has been on/off for 4-5 weeks, starting up again yesterday. Patient has hx multiple myeloma, on chemo. Patient denies fever, chills, sob, chest pain, n/v. Patient A&Ox4. Patient placed on cardiac monitor. Family at bedside. Plan of care in progress. Patient BIBEMS from home c/o diarrhea and generalized weakness. Patient states it has been on/off for 4-5 weeks, starting up again yesterday. Patient has hx multiple myeloma, on chemo. Patient had fever x 2 days with temp of 101 this morning, Tylenol given approx 430, patient afebrile on arrival to ED. Patient denies sob, chest pain, n/v, abd pain. Patient was in hospital about a month ago and tested negative for cdiff twice during stay. Patient A&Ox4. Patient placed on cardiac monitor. Family at bedside. Plan of care in progress. Patient BIBEMS from home c/o diarrhea and generalized weakness. Patient states it has been on/off for 4-5 weeks, starting up again yesterday. Patient has hx multiple myeloma, on chemo. Patient had fever x 2 days with temp of 101 this morning, Tylenol given approx 430, patient afebrile on arrival to ED. Patient denies sob, chest pain, n/v. Patient was in hospital about a month ago and tested negative for cdiff twice during stay. Patient A&Ox4. Patient placed on cardiac monitor. Family at bedside. Plan of care in progress.

## 2024-07-12 NOTE — H&P ADULT - HISTORY OF PRESENT ILLNESS
Date of Service, 07-12-24 @ 09:23  CHIEF COMPLAINT:Patient is a 73y old  Male who presents with a chief complaint of     HPI:  73-year-old male with past medical history of multiple myeloma on chemo, anemia, BPH, hypertension, hyperlipidemia, gout presenting with diarrhea.  Patient reports that he has had intermittent episodes of diarrhea for the past 6 weeks.  Patient was admitted to [facility] a month ago for UTI and hematuria.  During that admission C. difficile was negative x 2.  Patient states over the past few days diarrhea has progressively worsened.  Patient endorsing 6 episodes of watery diarrhea since last evening.  Patient states that he has been feeling generally weak and fatigued.  A few days ago he was able to work with physical therapy and was being scheduled to have a hip replacement, but today he is unable to even get out of bed secondary to fatigue.  Patient also endorsing fevers with Tmax of 101 yesterday.  Otherwise denies chest pain, shortness of breath, abdominal pain, vomiting, but does endorse poor appetite.    PAST MEDICAL & SURGICAL HISTORY:  Hypertension  Hyperlipidemia  Multiple myeloma  Calculus of ureter  BPH (benign prostatic hyperplasia)  Lumbar stenosis  History of osteoporosis  Arthritis  Anemia  CAD, multiple vessel  History of colonoscopy      MEDICATIONS  (STANDING):  Drisdol 1.25 mg (50,000 intl units) oral capsule: 1 cap(s) orally once a week Please take weekly for 6 weeks  · 	metoprolol succinate 25 mg oral tablet, extended release: 1 tab(s) orally 2 times a day  · 	calcium carbonate 1250 mg (500 mg elemental calcium) oral tablet: 1 tab(s) orally 2 times a day  · 	calcitriol 0.5 mcg oral capsule: 1 cap(s) orally once a day  · 	gabapentin 250 mg/5 mL oral solution: 1 milliliter(s) orally 3 times a day  · 	tamsulosin 0.4 mg oral capsule: 1 cap(s) orally 2 times a day  · 	finasteride 5 mg oral tablet: 1 tab(s) orally once a day  · 	allopurinol 100 mg oral tablet: orally once a day  · 	aspirin 81 mg oral tablet: 1 tab(s) orally once a day    MEDICATIONS  (PRN):      FAMILY HISTORY:  Family history of ovarian cancer (Mother)    Family history of myocardial infarction (Father)        SOCIAL HISTORY:    [ x] Non-smoker  [ ] Smoker  [ ] Alcohol    Allergies    No Known Drug Allergies  walnut (Faint; Urticaria; Anaphylaxis (Severe); Short breath; Hives; Headache; Rash)    Intolerances    	    REVIEW OF SYSTEMS:  CONSTITUTIONAL: No fever, weight loss, or fatigue  EYES: No eye pain, visual disturbances, or discharge  ENT:  No difficulty hearing, tinnitus, vertigo; No sinus or throat pain  NECK: No pain or stiffness  RESPIRATORY: No cough, wheezing, chills or hemoptysis; No Shortness of Breath  CARDIOVASCULAR: No chest pain, palpitations, passing out, dizziness, or leg swelling  GASTROINTESTINAL: No abdominal or epigastric pain. No nausea, vomiting, or hematemesis; + diarrhea . No melena or hematochezia.  GENITOURINARY: No dysuria, frequency, hematuria, or incontinence  NEUROLOGICAL: No headaches, memory loss, loss of strength, numbness, or tremors  SKIN: No itching, burning, rashes, or lesions   LYMPH Nodes: No enlarged glands  ENDOCRINE: No heat or cold intolerance; No hair loss  MUSCULOSKELETAL: No joint pain or swelling; No muscle, back, or extremity pain  PSYCHIATRIC: No depression, anxiety, mood swings, or difficulty sleeping  HEME/LYMPH: No easy bruising, or bleeding gums  ALLERGY AND IMMUNOLOGIC: No hives or eczema	    [x ] All others negative	  [ ] Unable to obtain    PHYSICAL EXAM:  T(C): 38.3 (07-12-24 @ 08:35), Max: 38.3 (07-12-24 @ 08:35)  HR: 102 (07-12-24 @ 08:11) (92 - 102)  BP: 112/67 (07-12-24 @ 08:11) (93/59 - 112/67)  RR: 17 (07-12-24 @ 08:11) (17 - 18)  SpO2: 100% (07-12-24 @ 08:11) (96% - 100%)  Wt(kg): --  I&O's Summary      Appearance: Normal	  HEENT:   Normal oral mucosa, PERRL, EOMI	  Lymphatic: No lymphadenopathy  Cardiovascular: Normal S1 S2, No JVD, + murmurs, No edema  Respiratory: rhonchi  Psychiatry: A & O x 3, Mood & affect appropriate  Gastrointestinal:  Soft, Non-tender, + BS	  Skin: No rashes, No ecchymoses, No cyanosis	  Neurologic: Non-focal  Extremities: Normal range of motion, No clubbing, cyanosis or edema  Vascular: Peripheral pulses palpable 2+ bilaterally    TELEMETRY: 	    ECG:  	  RADIOLOGY:  OTHER: 	  	  LABS:	 	    CARDIAC MARKERS:                              10.8   3.83  )-----------( 254      ( 12 Jul 2024 07:52 )             34.1     07-12    132<L>  |  98  |  19  ----------------------------<  108<H>  3.9   |  19<L>  |  1.33<H>    Ca    8.0<L>      12 Jul 2024 07:52  Phos  1.8     07-12  Mg     1.7     07-12    TPro  6.0  /  Alb  3.3  /  TBili  0.4  /  DBili  x   /  AST  8<L>  /  ALT  8<L>  /  AlkPhos  54  07-12    proBNP:   Lipid Profile:   HgA1c:   TSH:       PREVIOUS DIAGNOSTIC TESTING:      < from: 12 Lead ECG (06.19.24 @ 17:07) >  Diagnosis Line SINUS RHYTHM WITH OCCASIONAL PREMATURE VENTRICULAR COMPLEXES AND PREMATURE ATRIAL COMPLEXES  POSSIBLE LEFT ATRIAL ENLARGEMENT  ANTEROSEPTAL INFARCT (CITED ON OR BEFORE 31-MAY-2022)  ABNORMAL ECG  WHEN COMPARED WITH ECG OF 19-JUN-2024 17:06, (UNCONFIRMED)  SIGNIFICANT CHANGES HAVE OCCURRED    < from: CT Abdomen and Pelvis No Cont (06.21.24 @ 10:29) >  No evidence of bleed on this noncontrast exam.    New mild left hydroureteronephrosis to the level of the bladder.   Unchanged mild right hydroureteronephrosis.

## 2024-07-12 NOTE — PHARMACOTHERAPY INTERVENTION NOTE - COMMENTS
JESSEE LERMA, 73y Male with PMH of multiple myeloma on chemotherapy, being admitted for diarrhea and fevers, imaging showing pancolitis with a high WBC and increased creatinine found to be C. difficile positive.    Recommendation(s):  Request from ID for fidaxomicin for treatment, given this patient is immunocompromised and on chemotherapy he is at high risk for recurrence so fidaxomicin would be appropriate in this setting. Before ordering I called his pharmacy to see if it would be covered as an outpatient (he fills at [facility]), they told me that fidaxomicin is covered with no copay with his insurance. They do not stock this medication however so when planning for discharge the medication should be sent to an alternative pharmacy such as [facility].    After confirming the prescription was covered, discussed with ID and started fidaxomicin 200 mg PO BID for 10 day course and D/C'd vancomycin  mg q6h.    With kind regards,  Daniel Jones, PharmD, BCIDP  Infectious Diseases Clinical Pharmacist  Available on [employer] Teams  .

## 2024-07-12 NOTE — H&P ADULT - NSHPOUTPATIENTPROVIDERS_GEN_ALL_CORE
Mortimer Osgood at [facility] called stating that patient is having knee surgery on right leg on 01/26/2021. Mortimer Osgood is wanting to know if Dr. Jerzy Leon would like to have any labs done before patients surgery.  Please call Mortimer Osgood at [phone]
DR Kulkarni

## 2024-07-12 NOTE — CONSULT NOTE ADULT - ASSESSMENT
73-year-old male with past medical history of multiple myeloma on chemo, anemia, BPH, hypertension, hyperlipidemia, gout presenting with diarrhea.  Patient reports that he has had intermittent episodes of "diarrhea" (described as loose, but not watery) for the past 6 weeks.  Patient was admitted to [facility] a month ago for UTI and hematuria.  During that admission C. difficile was negative x 2.  Patient states over the past few days diarrhea has progressively worsened.  Patient endorsing 6 episodes of watery mucoid, non bloody diarrhea over a 12 hour period yesterday with associated fever ([id number])    #MM and  hx AOCD - follows with Hem-Onc, on chemo  #Hx UTIs - recent Abx 6/2024  #Diarrhea with pancolitis  high index of suspicion for C diff    RECS:  -Rec ID input  -recommend empiric PO Vanco 125 mg QID pending C diff specimen/result  -await Stool Cx, GI PCR, C diff as ordered  -monitor counts  -monitor fever curve  -ok for PO liquids from GI standpoint  -no indication for urgent endoluminal evaluation at this time    Discussed with ED team and Dr Rob  Discussed with pt and spouse  Please call GI service over weekend prn with acute GI concerns   GI service : [phone]    [facility]  Available on TEAMS Mon-Fri 8a-4p  After hours and weekend coverage [phone]     73-year-old male with past medical history of multiple myeloma on chemo, anemia, BPH, hypertension, hyperlipidemia, gout presenting with diarrhea.  Patient reports that he has had intermittent episodes of "diarrhea" (described as loose, but not watery) for the past 6 weeks.  Patient was admitted to [facility] a month ago for UTI and hematuria.  During that admission C. difficile was negative x 2.  Patient states over the past few days diarrhea has progressively worsened.  Patient endorsing 6 episodes of watery mucoid, non bloody diarrhea over a 12 hour period yesterday with associated fever ([id number])    #MM and  hx AOCD - follows with Hem-Onc, on chemo  #Hx UTIs - recent Abx 6/2024  #Diarrhea with pancolitis  high index of suspicion for C diff    RECS:  -Rec ID input  -recommend empiric PO Vanco 125 mg QID pending C diff specimen/result and ID input  -await Stool Cx, GI PCR, C diff as ordered  -monitor counts  -monitor fever curve  -ok for PO liquids from GI standpoint  -no indication for urgent endoluminal evaluation at this time    Discussed with ED team and Dr Rob  Discussed with pt and spouse  Please call GI service over weekend prn with acute GI concerns   GI service : [phone]    Sean Solano PA-C  [facility] Non Teaching GI Service  Available on TEAMS Mon-Fri 8a-4p  After hours and weekend coverage [phone]

## 2024-07-12 NOTE — H&P ADULT - NSHPLABSRESULTS_GEN_ALL_CORE
< from: TTE W or WO Ultrasound Enhancing Agent (06.18.24 @ 11:13) >     1. Left ventricular cavity is normal in size. Left ventricular systolic function is moderately decreased with an ejection fraction of 41 % by Schroeder's method of disks. Regional wall motion abnormalities present.   2. Multiple segmental abnormalities exist. See findings.   3. Normal right ventricular cavity size and normal systolic function. Tricuspid annular plane systolic excursion (TAPSE) is 1.9 cm (normal >=1.7 cm). Tricuspid annular tissue Doppler S' is 12.0 cm/s (normal >10 cm/s).   4. No pericardial effusion seen.   5. Interpretation limited by tachycardia, frequent APCS and PVCs.   6. Compared to the transthoracic echocardiogram performed on 6/2/2022, EF is lower.   7. No left ventricular hypertrophy.   8. There is no evidence of a left ventricular thrombus.

## 2024-07-12 NOTE — PATIENT PROFILE ADULT - CENTRAL VENOUS CATHETER/PICC LINE
Arthritis    Essential hypertension    Gastroesophageal reflux disease without esophagitis    Mild intermittent asthma without complication    Neuropathy    Type 2 diabetes mellitus without complication, without long-term current use of insulin
no

## 2024-07-12 NOTE — ED PROVIDER NOTE - CLINICAL SUMMARY MEDICAL DECISION MAKING FREE TEXT BOX
Adult male multiple medical problems as above  including multiple myeloma on chemo, BPH with obstruction with recent UTI admission, presents with 6 weeks of diarrhea that have now worsened dramatically over the past 2 days with fever 101.1.  Concerns for infectious diarrhea, C. difficile, colitis.  Plan IV hydration stool studies, CT abdomen pelvis.  Probable admission  Juventino Rodríguez MD, Facep

## 2024-07-12 NOTE — CONSULT NOTE ADULT - SUBJECTIVE AND OBJECTIVE BOX
Patient is a 73y old  Male who presents with a chief complaint of hypotention (12 Jul 2024 11:20)      HPI:  73-year-old male with past medical history of multiple myeloma on chemo, anemia, BPH, hypertension, hyperlipidemia, gout presenting with diarrhea.  Patient reports that he has had intermittent episodes of diarrhea for the past 6 weeks.  Patient was admitted to [facility] a month ago for UTI and hematuria.  During that admission C. difficile was negative x 2.  Patient states over the past few days diarrhea has progressively worsened.  Patient endorsing 6 episodes of watery diarrhea since last evening.  Patient states that he has been feeling generally weak and fatigued.  A few days ago he was able to work with physical therapy and was being scheduled to have a hip replacement, but today he is unable to even get out of bed secondary to fatigue.  Patient also endorsing fevers with Tmax of 101 yesterday.  Otherwise denies chest pain, shortness of breath, abdominal pain, vomiting, but does endorse poor appetite.      PAST MEDICAL & SURGICAL HISTORY:  Hypertension  Hyperlipidemia  Multiple myeloma  Calculus of ureter  BPH (benign prostatic hyperplasia)  Lumbar stenosis  History of osteoporosis  Arthritis  Anemia  CAD, multiple vessel  History of colonoscopy        FAMILY HISTORY:  Family history of ovarian cancer (Mother)    Family history of myocardial infarction (Father)        SOCIAL HISTORY:    former smoker  lives with spouse        Allergies  No Known Drug Allergies  walnut (Faint; Urticaria; Anaphylaxis (Severe); Short breath; Hives; Headache; Rash)            REVIEW OF SYSTEMS    General: + Fevers and chills     Skin: No rash  	  Ophthalmologic: Denies any discharge, redness.  	  ENMT: No nasal congestion or throat pain.     Respiratory and Thorax: No cough, sputum. Denies shortness of breath.  	  Cardiovascular: No chest pain,     Gastrointestinal: per HPI    Genitourinary: No dysuria,     Musculoskeletal: No joint swelling     Neurological: No new extremity weakness.    Psychiatric: No hallucinations	    Extremities: No swelling     Endocrine: No  abnormal heat or cold intolerance     Allergic/Immunologic: No hives          Antimicrobials:  fidaxomicin 200 milliGRAM(s) Oral two times a day        Vital Signs Last 24 Hrs  T(C): 37.2 (12 Jul 2024 11:53), Max: 38.3 (12 Jul 2024 08:35)  T(F): 98.9 (12 Jul 2024 11:53), Max: 101 (12 Jul 2024 08:35)  HR: 77 (12 Jul 2024 11:00) (77 - 102)  BP: 114/61 (12 Jul 2024 11:00) (93/59 - 114/61)  BP(mean): 77 (12 Jul 2024 11:00) (77 - 79)  RR: 18 (12 Jul 2024 11:00) (17 - 18)  SpO2: 100% (12 Jul 2024 11:00) (96% - 100%)    Parameters below as of 12 Jul 2024 11:00  Patient On (Oxygen Delivery Method): room air        PHYSICAL EXAM: Patient in no acute distress.    Constitutional: Awake and alert    Eyes: No discharge or conjunctival injection    ENMT: No thrush. No pharyngeal erythema.    Neck: Supple,     Respiratory:  + air entry bilaterally.    Cardiovascular: S1 S2 wnl,     Gastrointestinal: Soft, no tenderness, non distended.    Genitourinary: No chapman     Extremities: No edema.    Vascular: peripheral pulses felt    Neurological: No new gross focal deficits.    Skin: No rash     Musculoskeletal: No joint swelling.    Psychiatric: Affect normal.                              10.8   3.83  )-----------( 254      ( 12 Jul 2024 07:52 )             34.1       07-12    132<L>  |  98  |  19  ----------------------------<  108<H>  3.9   |  19<L>  |  1.33<H>    Ca    8.0<L>      12 Jul 2024 07:52  Phos  1.8     07-12  Mg     1.7     07-12    TPro  6.0  /  Alb  3.3  /  TBili  0.4  /  DBili  x   /  AST  8<L>  /  ALT  8<L>  /  AlkPhos  54  07-12      Urinalysis (07.12.24 @ 08:16)   pH Urine: 5.5  Glucose Qualitative, Urine: Negative mg/dL  Blood, Urine: Trace  Color: Yellow  Urine Appearance: Cloudy  Bilirubin: Negative  Ketone - Urine: Trace mg/dL  Specific Gravity: 1.021  Protein, Urine: 100 mg/dL  Urobilinogen: 0.2 mg/dL  Nitrite: Negative  Leukocyte Esterase Concentration: Moderate  Urine Microscopic-Add On (NC) (07.12.24 @ 08:16)   Epithelial Cells: 3 /HPF  Review: Reviewed  Cast: 4 /LPF  Red Blood Cell - Urine: 1 /HPF  White Blood Cell - Urine: 360 /HPF  Bacteria: Many /HPF      Radiology: Imaging reviewed and visualized personally [ x]               Patient is a 73y old  Male who presents with a chief complaint of hypotention (12 Jul 2024 11:20)      HPI:  73-year-old male with past medical history of multiple myeloma on chemo, anemia, BPH, hypertension, hyperlipidemia, gout presenting with diarrhea.  Patient reports that he has had intermittent episodes of diarrhea for the past 6 weeks.  Patient was admitted to [facility] a month ago for UTI and hematuria.  During that admission C. difficile was negative x 2.  Patient states over the past few days diarrhea has progressively worsened.  Patient endorsing 6 episodes of watery diarrhea since last evening.  Patient states that he has been feeling generally weak and fatigued.  A few days ago he was able to work with physical therapy and was being scheduled to have a hip replacement, but today he is unable to even get out of bed secondary to fatigue.  Patient also endorsing fevers with Tmax of 101 yesterday.  Otherwise denies chest pain, shortness of breath, abdominal pain, vomiting, but does endorse poor appetite.      PAST MEDICAL & SURGICAL HISTORY:  Hypertension  Hyperlipidemia  Multiple myeloma  Calculus of ureter  BPH (benign prostatic hyperplasia)  Lumbar stenosis  History of osteoporosis  Arthritis  Anemia  CAD, multiple vessel  History of colonoscopy        FAMILY HISTORY:  Family history of ovarian cancer (Mother)    Family history of myocardial infarction (Father)        SOCIAL HISTORY:    former smoker  lives with spouse        Allergies  No Known Drug Allergies  walnut (Faint; Urticaria; Anaphylaxis (Severe); Short breath; Hives; Headache; Rash)            REVIEW OF SYSTEMS    General: + Fevers and chills     Skin: No rash  	  Ophthalmologic: Denies any discharge, redness.  	  ENMT: No nasal congestion or throat pain.     Respiratory and Thorax: No cough, sputum. Denies shortness of breath.  	  Cardiovascular: No chest pain,     Gastrointestinal: per HPI    Genitourinary: No dysuria,     Musculoskeletal: No joint swelling     Neurological: No new extremity weakness.    Psychiatric: No hallucinations	    Extremities: No swelling     Endocrine: No  abnormal heat or cold intolerance     Allergic/Immunologic: No hives          Antimicrobials:  fidaxomicin 200 milliGRAM(s) Oral two times a day        Vital Signs Last 24 Hrs  T(C): 37.2 (12 Jul 2024 11:53), Max: 38.3 (12 Jul 2024 08:35)  T(F): 98.9 (12 Jul 2024 11:53), Max: 101 (12 Jul 2024 08:35)  HR: 77 (12 Jul 2024 11:00) (77 - 102)  BP: 114/61 (12 Jul 2024 11:00) (93/59 - 114/61)  BP(mean): 77 (12 Jul 2024 11:00) (77 - 79)  RR: 18 (12 Jul 2024 11:00) (17 - 18)  SpO2: 100% (12 Jul 2024 11:00) (96% - 100%)    Parameters below as of 12 Jul 2024 11:00  Patient On (Oxygen Delivery Method): room air        PHYSICAL EXAM: Patient in no acute distress.    Constitutional: Awake and alert    Eyes: No discharge or conjunctival injection    ENMT: No thrush. No pharyngeal erythema.    Neck: Supple,     Respiratory:  + air entry bilaterally.    Cardiovascular: S1 S2 wnl,     Gastrointestinal: Soft, no tenderness, non distended.    Genitourinary: No chapman     Extremities: No edema.    Vascular: peripheral pulses felt    Neurological: No new gross focal deficits.    Skin: No rash     Musculoskeletal: No joint swelling.    Psychiatric: Affect normal.                              10.8   3.83  )-----------( 254      ( 12 Jul 2024 07:52 )             34.1       07-12    132<L>  |  98  |  19  ----------------------------<  108<H>  3.9   |  19<L>  |  1.33<H>    Ca    8.0<L>      12 Jul 2024 07:52  Phos  1.8     07-12  Mg     1.7     07-12    TPro  6.0  /  Alb  3.3  /  TBili  0.4  /  DBili  x   /  AST  8<L>  /  ALT  8<L>  /  AlkPhos  54  07-12      Urinalysis (07.12.24 @ 08:16)   pH Urine: 5.5  Glucose Qualitative, Urine: Negative mg/dL  Blood, Urine: Trace  Color: Yellow  Urine Appearance: Cloudy  Bilirubin: Negative  Ketone - Urine: Trace mg/dL  Specific Gravity: 1.021  Protein, Urine: 100 mg/dL  Urobilinogen: 0.2 mg/dL  Nitrite: Negative  Leukocyte Esterase Concentration: Moderate  Urine Microscopic-Add On (NC) (07.12.24 @ 08:16)   Epithelial Cells: 3 /HPF  Review: Reviewed  Cast: 4 /LPF  Red Blood Cell - Urine: 1 /HPF  White Blood Cell - Urine: 360 /HPF  Bacteria: Many /HPF        Radiology: Imaging reviewed and visualized personally [ x]      < from: Xray Chest 1 View AP/PA (07.12.24 @ 10:02) >  IMPRESSION:    Clear lungs.        < from: CT Abdomen and Pelvis w/ IV Cont (07.12.24 @ 09:23) >  IMPRESSION:  Pancolitis, infectious or inflammatory.    Enlarged prostate with evidence of chronic bladder outlet obstruction.    Mild left hydronephrosis to the level of the urinary bladder, unchanged.    A 1.4 cm indeterminate left hepatic lobe lesion stable in size from prior   imaging dating to February 2024. If warranted this may be further   evaluated with contrast-enhanced abdominal MRI.

## 2024-07-12 NOTE — H&P ADULT - ASSESSMENT
73-year-old male with past medical history of multiple myeloma on chemo, anemia, BPH, hypertension, hyperlipidemia, gout presenting with diarrhea.  Patient reports that he has had intermittent episodes of diarrhea for the past 6 weeks.  Patient was admitted to [facility] a month ago for UTI and hematuria.  During that admission C. difficile was negative x 2.  Patient states over the past few days diarrhea has progressively worsened.  Patient endorsing 6 episodes of watery diarrhea since last evening.  Patient states that he has been feeling generally weak and fatigued.  A few days ago he was able to work with physical therapy and was being scheduled to have a hip replacement, but today he is unable to even get out of bed secondary to fatigue.  Patient also endorsing fevers with Tmax of 101 yesterday.  Otherwise denies chest pain, shortness of breath, abdominal pain, vomiting, but does endorse poor appetite.  pt is well known to me with hx of htn, MM, cad, admitted with hematuria with c/o worsening diarrhea  r/o c diff  colitis, check stool culture  start on IVF  awaiting ct abdomen and pelvis  GI consult called  CAD/ decrease EF, pt will eventually needs cardiac cath

## 2024-07-12 NOTE — CONSULT NOTE ADULT - NS ATTEND AMEND GEN_ALL_CORE FT
Agree with above note. Briefly, Pt. is a 73-year-old male with past medical history of multiple myeloma on chemo, anemia, BPH, hypertension, hyperlipidemia, gout presenting with diarrhea.  Patient reports that he has had intermittent episodes of "diarrhea" (described as loose, but not watery) for the past 6 weeks.  Patient was admitted to [facility] a month ago for UTI and hematuria.  During that admission C. difficile was negative x 2.  Patient states over the past few days diarrhea has progressively worsened.  Patient endorsing 6 episodes of watery mucoid, non bloody diarrhea over a 12 hour period yesterday with associated fever ([id number])    Issues:   1. MM and  hx AOCD - follows with Hem-Onc, on chemo  2. Hx UTIs - recent Abx 6/2024  3. Diarrhea with pancolitis  high index of suspicion for C diff  Plan:  -Rec ID input  -recommend empiric PO Vanco 125 mg QID pending C diff specimen/result and ID input  -await Stool Cx, GI PCR, C diff testing as ordered  -monitor stool counts and daily labs  -monitor fever curve  -ok for PO liquids from GI standpoint  -no indication for urgent endoluminal evaluation at this time    Garcia Arriaga MD  [facility] GI

## 2024-07-12 NOTE — ED ADULT NURSE REASSESSMENT NOTE - NS ED NURSE REASSESS COMMENT FT1
Straight cath done for residual urine. Patient tolerated procedure well. 2 RN's present and sterile technique maintained. 150cc of clear yellow urine output.

## 2024-07-13 LAB
ANION GAP SERPL CALC-SCNC: 15 MMOL/L — SIGNIFICANT CHANGE UP (ref 5–17)
BUN SERPL-MCNC: 16 MG/DL — SIGNIFICANT CHANGE UP (ref 7–23)
CALCIUM SERPL-MCNC: 7.7 MG/DL — LOW (ref 8.4–10.5)
CHLORIDE SERPL-SCNC: 101 MMOL/L — SIGNIFICANT CHANGE UP (ref 96–108)
CO2 SERPL-SCNC: 16 MMOL/L — LOW (ref 22–31)
CREAT SERPL-MCNC: 1.17 MG/DL — SIGNIFICANT CHANGE UP (ref 0.5–1.3)
EGFR: 65 ML/MIN/1.73M2 — SIGNIFICANT CHANGE UP
GLUCOSE SERPL-MCNC: 78 MG/DL — SIGNIFICANT CHANGE UP (ref 70–99)
HCT VFR BLD CALC: 29.2 % — LOW (ref 39–50)
HGB BLD-MCNC: 9.4 G/DL — LOW (ref 13–17)
MCHC RBC-ENTMCNC: 29.4 PG — SIGNIFICANT CHANGE UP (ref 27–34)
MCHC RBC-ENTMCNC: 32.2 GM/DL — SIGNIFICANT CHANGE UP (ref 32–36)
MCV RBC AUTO: 91.3 FL — SIGNIFICANT CHANGE UP (ref 80–100)
NRBC # BLD: 0 /100 WBCS — SIGNIFICANT CHANGE UP (ref 0–0)
PLATELET # BLD AUTO: 272 K/UL — SIGNIFICANT CHANGE UP (ref 150–400)
POTASSIUM SERPL-MCNC: 4.1 MMOL/L — SIGNIFICANT CHANGE UP (ref 3.5–5.3)
POTASSIUM SERPL-SCNC: 4.1 MMOL/L — SIGNIFICANT CHANGE UP (ref 3.5–5.3)
RBC # BLD: 3.2 M/UL — LOW (ref 4.2–5.8)
RBC # FLD: 15.8 % — HIGH (ref 10.3–14.5)
SODIUM SERPL-SCNC: 132 MMOL/L — LOW (ref 135–145)
WBC # BLD: 4.44 K/UL — SIGNIFICANT CHANGE UP (ref 3.8–10.5)
WBC # FLD AUTO: 4.44 K/UL — SIGNIFICANT CHANGE UP (ref 3.8–10.5)

## 2024-07-13 RX ADMIN — Medication 60 MILLILITER(S): at 21:39

## 2024-07-13 RX ADMIN — Medication 50 MILLIGRAM(S): at 05:04

## 2024-07-13 RX ADMIN — ALLOPURINOL 100 MILLIGRAM(S): 300 TABLET ORAL at 12:48

## 2024-07-13 RX ADMIN — TAMSULOSIN HYDROCHLORIDE 0.4 MILLIGRAM(S): 0.4 CAPSULE ORAL at 21:34

## 2024-07-13 RX ADMIN — FIDAXOMICIN 200 MILLIGRAM(S): 200 GRANULE, FOR SUSPENSION ORAL at 05:04

## 2024-07-13 RX ADMIN — OXYCODONE HYDROCHLORIDE 10 MILLIGRAM(S): 100 SOLUTION ORAL at 20:00

## 2024-07-13 RX ADMIN — Medication 100 MILLIGRAM(S): at 21:33

## 2024-07-13 RX ADMIN — Medication 100 MILLIGRAM(S): at 05:04

## 2024-07-13 RX ADMIN — OXYCODONE HYDROCHLORIDE 10 MILLIGRAM(S): 100 SOLUTION ORAL at 21:00

## 2024-07-13 RX ADMIN — Medication 5 MILLIGRAM(S): at 12:49

## 2024-07-13 RX ADMIN — Medication 100 MILLIGRAM(S): at 14:12

## 2024-07-13 RX ADMIN — ASPIRIN 81 MILLIGRAM(S): 325 TABLET, FILM COATED ORAL at 12:48

## 2024-07-13 RX ADMIN — Medication 1 TABLET(S): at 12:56

## 2024-07-13 RX ADMIN — FIDAXOMICIN 200 MILLIGRAM(S): 200 GRANULE, FOR SUSPENSION ORAL at 17:37

## 2024-07-13 NOTE — PROGRESS NOTE ADULT - SUBJECTIVE AND OBJECTIVE BOX
Date of Service: 07-13-24 @ 10:24           CARDIOLOGY     PROGRESS  NOTE   ________________________________________________    CHIEF COMPLAINT:Patient is a 73y old  Male who presents with a chief complaint of hypotention (12 Jul 2024 18:21)  hip pain, no chest pain, +diiarrhea  	  REVIEW OF SYSTEMS:  CONSTITUTIONAL: No fever, weight loss, or fatigue  EYES: No eye pain, visual disturbances, or discharge  ENT:  No difficulty hearing, tinnitus, vertigo; No sinus or throat pain  NECK: No pain or stiffness  RESPIRATORY: No cough, wheezing, chills or hemoptysis; No Shortness of Breath  CARDIOVASCULAR: No chest pain, palpitations, passing out, dizziness, or leg swelling  GASTROINTESTINAL: No abdominal or epigastric pain. No nausea, vomiting, or hematemesis; No diarrhea or constipation. No melena or hematochezia.  GENITOURINARY: No dysuria, frequency, hematuria, or incontinence  NEUROLOGICAL: No headaches, memory loss, loss of strength, numbness, or tremors  SKIN: No itching, burning, rashes, or lesions   LYMPH Nodes: No enlarged glands  ENDOCRINE: No heat or cold intolerance; No hair loss  MUSCULOSKELETAL: No joint pain or swelling; No muscle, back, or extremity pain  PSYCHIATRIC: No depression, anxiety, mood swings, or difficulty sleeping  HEME/LYMPH: No easy bruising, or bleeding gums  ALLERGY AND IMMUNOLOGIC: No hives or eczema	    [x ] All others negative	  [ ] Unable to obtain    PHYSICAL EXAM:  T(C): 37 (07-13-24 @ 08:37), Max: 38.1 (07-12-24 @ 18:15)  HR: 104 (07-13-24 @ 08:37) (77 - 105)  BP: 114/73 (07-13-24 @ 08:37) (100/65 - 122/74)  RR: 18 (07-13-24 @ 08:37) (18 - 19)  SpO2: 96% (07-13-24 @ 08:37) (95% - 100%)  Wt(kg): --  I&O's Summary    12 Jul 2024 07:01  -  13 Jul 2024 07:00  --------------------------------------------------------  IN: 850 mL / OUT: 400 mL / NET: 450 mL        Appearance: Normal	  HEENT:   Normal oral mucosa, PERRL, EOMI	  Lymphatic: No lymphadenopathy  Cardiovascular: Normal S1 S2, No JVD, + murmurs, No edema  Respiratory rhonchi  Psychiatry: A & O x 3, Mood & affect appropriate  Gastrointestinal:  Soft, Non-tender, + BS	  Skin: No rashes, No ecchymoses, No cyanosis	  Neurologic: Non-focal  Extremities: Normal range of motion, No clubbing, cyanosis or edema  Vascular: Peripheral pulses palpable 2+ bilaterally    MEDICATIONS  (STANDING):  allopurinol 100 milliGRAM(s) Oral daily  aspirin enteric coated 81 milliGRAM(s) Oral daily  calcium carbonate   1250 mG (OsCal) 1 Tablet(s) Oral daily  fidaxomicin 200 milliGRAM(s) Oral two times a day  finasteride 5 milliGRAM(s) Oral daily  gabapentin Solution 100 milliGRAM(s) Oral three times a day  metoprolol succinate ER 50 milliGRAM(s) Oral daily  sodium chloride 0.9%. 1000 milliLiter(s) (60 mL/Hr) IV Continuous <Continuous>  tamsulosin 0.4 milliGRAM(s) Oral at bedtime      TELEMETRY: 	    ECG:  	  RADIOLOGY:  OTHER: 	  	  LABS:	 	    CARDIAC MARKERS:                                9.4    4.44  )-----------( 272      ( 13 Jul 2024 07:33 )             29.2     07-13    132<L>  |  101  |  16  ----------------------------<  78  4.1   |  16<L>  |  1.17    Ca    7.7<L>      13 Jul 2024 07:33  Phos  1.8     07-12  Mg     1.7     07-12    TPro  6.0  /  Alb  3.3  /  TBili  0.4  /  DBili  x   /  AST  8<L>  /  ALT  8<L>  /  AlkPhos  54  07-12    proBNP:   Lipid Profile:   HgA1c:   TSH: Thyroid Stimulating Hormone, Serum: 0.58 uIU/mL (06-20 @ 06:23)    < from: CT Abdomen and Pelvis w/ IV Cont (07.12.24 @ 09:23) >  Pancolitis, infectious or inflammatory.    Enlarged prostate with evidence of chronic bladder outlet obstruction.    Mild left hydronephrosis to the level of the urinary bladder, unchanged.    A 1.4 cm indeterminate left hepatic lobe lesion stable in size from prior   imaging dating to February 2024. If warranted this may be further   evaluated with contrast-enhanced abdominal MRI.    -Rec ID input  -recommend empiric PO Vanco 125 mg QID pending C diff specimen/result and ID input  -await Stool Cx, GI PCR, C diff as ordered  -monitor counts  -monitor fever curve  -ok for PO liquids from GI standpoint  -no indication for urgent endoluminal evaluation at this time      PLAN:   started on dificid, given pt at high risk for recurrence,  follow up blood cx  stool cx in lab  GI PCR in lab.   abnormal U/A, urine cx in lab, given active c diff colitis, unless change in clinical status not planning to treat even if urine cx positive.    Ova and Parasites (07.12.24 @ 12:32)    Culture Results:   Testing in progress    Culture - Blood (06.18.24 @ 21:30)    Specimen Source: .Blood Blood-Peripheral   Culture Results:   No growth at 5 days    C. difficile GDH &amp; toxins A/B by EIA (07.12.24 @ 13:07)    Clostridium difficile GDH Toxins A&amp;B, EIA:   Positive   Clostridium difficile GDH Interpretation: Positive for toxigenic C. Difficile.  This specimen is positive for C.  Difficile glutamate dehydrogenase (GDH) antigen and positive for C.  Difficile Toxins A & B, by EIA.  GDH is a highly sensitive marker for C.  Difficile that is produced in largeamounts by all C. Difficile strains,  both toxigenic and nontoxigenic.  This assay has not been validated as a  test of cure.  The results of this assay should always be interpreted in  conjunction with patient's clinical history.        Assessment and plan  ---------------------------  73-year-old male with past medical history of multiple myeloma on chemo, anemia, BPH, hypertension, hyperlipidemia, gout presenting with diarrhea.  Patient reports that he has had intermittent episodes of diarrhea for the past 6 weeks.  Patient was admitted to [facility] a month ago for UTI and hematuria.  During that admission C. difficile was negative x 2.  Patient states over the past few days diarrhea has progressively worsened.  Patient endorsing 6 episodes of watery diarrhea since last evening.  Patient states that he has been feeling generally weak and fatigued.  A few days ago he was able to work with physical therapy and was being scheduled to have a hip replacement, but today he is unable to even get out of bed secondary to fatigue.  Patient also endorsing fevers with Tmax of 101 yesterday.  Otherwise denies chest pain, shortness of breath, abdominal pain, vomiting, but does endorse poor appetite.  pt is well known to me with hx of htn, MM, cad, admitted with hematuria with c/o worsening diarrhea  r/o c diff  colitis, check stool culture  start on IVF  awaiting ct abdomen and pelvis  GI consult called  CAD/ decrease EF, pt will eventually needs cardiac cath  C diff +, ID gi noted appreciated  start on clear liquid diet  MM onc follow up as needed  CAD/ASHD, adjust cardiac meds, suspect sig cad, stable  continue ivf/ follow up cultures

## 2024-07-14 LAB
ANION GAP SERPL CALC-SCNC: 12 MMOL/L — SIGNIFICANT CHANGE UP (ref 5–17)
BUN SERPL-MCNC: 17 MG/DL — SIGNIFICANT CHANGE UP (ref 7–23)
CALCIUM SERPL-MCNC: 7.4 MG/DL — LOW (ref 8.4–10.5)
CHLORIDE SERPL-SCNC: 103 MMOL/L — SIGNIFICANT CHANGE UP (ref 96–108)
CO2 SERPL-SCNC: 20 MMOL/L — LOW (ref 22–31)
CREAT SERPL-MCNC: 1.02 MG/DL — SIGNIFICANT CHANGE UP (ref 0.5–1.3)
CULTURE RESULTS: SIGNIFICANT CHANGE UP
EGFR: 77 ML/MIN/1.73M2 — SIGNIFICANT CHANGE UP
GLUCOSE SERPL-MCNC: 94 MG/DL — SIGNIFICANT CHANGE UP (ref 70–99)
HCT VFR BLD CALC: 27.7 % — LOW (ref 39–50)
HGB BLD-MCNC: 8.7 G/DL — LOW (ref 13–17)
MCHC RBC-ENTMCNC: 28.7 PG — SIGNIFICANT CHANGE UP (ref 27–34)
MCHC RBC-ENTMCNC: 31.4 GM/DL — LOW (ref 32–36)
MCV RBC AUTO: 91.4 FL — SIGNIFICANT CHANGE UP (ref 80–100)
NRBC # BLD: 0 /100 WBCS — SIGNIFICANT CHANGE UP (ref 0–0)
PLATELET # BLD AUTO: 312 K/UL — SIGNIFICANT CHANGE UP (ref 150–400)
POTASSIUM SERPL-MCNC: 3.5 MMOL/L — SIGNIFICANT CHANGE UP (ref 3.5–5.3)
POTASSIUM SERPL-SCNC: 3.5 MMOL/L — SIGNIFICANT CHANGE UP (ref 3.5–5.3)
RBC # BLD: 3.03 M/UL — LOW (ref 4.2–5.8)
RBC # FLD: 15.8 % — HIGH (ref 10.3–14.5)
SODIUM SERPL-SCNC: 135 MMOL/L — SIGNIFICANT CHANGE UP (ref 135–145)
SPECIMEN SOURCE: SIGNIFICANT CHANGE UP
WBC # BLD: 4.11 K/UL — SIGNIFICANT CHANGE UP (ref 3.8–10.5)
WBC # FLD AUTO: 4.11 K/UL — SIGNIFICANT CHANGE UP (ref 3.8–10.5)

## 2024-07-14 PROCEDURE — 99232 SBSQ HOSP IP/OBS MODERATE 35: CPT

## 2024-07-14 RX ORDER — HEPARIN SODIUM 50 [USP'U]/ML
5000 INJECTION, SOLUTION INTRAVENOUS EVERY 12 HOURS
Refills: 0 | Status: DISCONTINUED | OUTPATIENT
Start: 2024-07-14 | End: 2024-07-19

## 2024-07-14 RX ORDER — POTASSIUM CHLORIDE 600 MG/1
20 TABLET, FILM COATED, EXTENDED RELEASE ORAL
Refills: 0 | Status: COMPLETED | OUTPATIENT
Start: 2024-07-14 | End: 2024-07-14

## 2024-07-14 RX ADMIN — POTASSIUM CHLORIDE 20 MILLIEQUIVALENT(S): 600 TABLET, FILM COATED, EXTENDED RELEASE ORAL at 12:07

## 2024-07-14 RX ADMIN — OXYCODONE HYDROCHLORIDE 10 MILLIGRAM(S): 100 SOLUTION ORAL at 22:06

## 2024-07-14 RX ADMIN — ASPIRIN 81 MILLIGRAM(S): 325 TABLET, FILM COATED ORAL at 12:07

## 2024-07-14 RX ADMIN — Medication 100 MILLIGRAM(S): at 13:20

## 2024-07-14 RX ADMIN — TAMSULOSIN HYDROCHLORIDE 0.4 MILLIGRAM(S): 0.4 CAPSULE ORAL at 21:28

## 2024-07-14 RX ADMIN — OXYCODONE HYDROCHLORIDE 10 MILLIGRAM(S): 100 SOLUTION ORAL at 22:30

## 2024-07-14 RX ADMIN — Medication 50 MILLIGRAM(S): at 05:19

## 2024-07-14 RX ADMIN — Medication 1 TABLET(S): at 12:07

## 2024-07-14 RX ADMIN — OXYCODONE HYDROCHLORIDE 10 MILLIGRAM(S): 100 SOLUTION ORAL at 16:40

## 2024-07-14 RX ADMIN — Medication 60 MILLILITER(S): at 15:58

## 2024-07-14 RX ADMIN — Medication 5 MILLIGRAM(S): at 12:07

## 2024-07-14 RX ADMIN — Medication 100 MILLIGRAM(S): at 05:19

## 2024-07-14 RX ADMIN — HEPARIN SODIUM 5000 UNIT(S): 50 INJECTION, SOLUTION INTRAVENOUS at 17:00

## 2024-07-14 RX ADMIN — ALLOPURINOL 100 MILLIGRAM(S): 300 TABLET ORAL at 12:07

## 2024-07-14 RX ADMIN — FIDAXOMICIN 200 MILLIGRAM(S): 200 GRANULE, FOR SUSPENSION ORAL at 05:19

## 2024-07-14 RX ADMIN — OXYCODONE HYDROCHLORIDE 10 MILLIGRAM(S): 100 SOLUTION ORAL at 15:58

## 2024-07-14 RX ADMIN — Medication 100 MILLIGRAM(S): at 21:28

## 2024-07-14 RX ADMIN — POTASSIUM CHLORIDE 20 MILLIEQUIVALENT(S): 600 TABLET, FILM COATED, EXTENDED RELEASE ORAL at 10:11

## 2024-07-14 RX ADMIN — FIDAXOMICIN 200 MILLIGRAM(S): 200 GRANULE, FOR SUSPENSION ORAL at 17:00

## 2024-07-14 NOTE — PROGRESS NOTE ADULT - SUBJECTIVE AND OBJECTIVE BOX
infectious diseases progress note:    Patient is a 73y old  Male who presents with a chief complaint of hypotention (13 Jul 2024 10:24)        Ulcerative chronic pancolitis without complications             Allergies    No Known Drug Allergies  walnut (Faint; Urticaria; Anaphylaxis (Severe); Short breath; Hives; Headache; Rash)    Intolerances        ANTIBIOTICS/RELEVANT:  antimicrobials  fidaxomicin 200 milliGRAM(s) Oral two times a day    immunologic:    OTHER:  acetaminophen     Tablet .. 650 milliGRAM(s) Oral every 6 hours PRN  allopurinol 100 milliGRAM(s) Oral daily  aspirin enteric coated 81 milliGRAM(s) Oral daily  calcium carbonate   1250 mG (OsCal) 1 Tablet(s) Oral daily  finasteride 5 milliGRAM(s) Oral daily  gabapentin Solution 100 milliGRAM(s) Oral three times a day  metoprolol succinate ER 50 milliGRAM(s) Oral daily  oxyCODONE    IR 10 milliGRAM(s) Oral every 6 hours PRN  sodium chloride 0.9%. 1000 milliLiter(s) IV Continuous <Continuous>  tamsulosin 0.4 milliGRAM(s) Oral at bedtime      Objective:  Vital Signs Last 24 Hrs  T(C): 36.9 (14 Jul 2024 04:35), Max: 37 (13 Jul 2024 08:37)  T(F): 98.4 (14 Jul 2024 04:35), Max: 98.6 (13 Jul 2024 08:37)  HR: 82 (14 Jul 2024 04:35) (82 - 104)  BP: 106/69 (14 Jul 2024 04:35) (102/56 - 115/70)  BP(mean): --  RR: 18 (14 Jul 2024 04:35) (18 - 18)  SpO2: 96% (14 Jul 2024 04:35) (96% - 98%)    Parameters below as of 14 Jul 2024 04:35  Patient On (Oxygen Delivery Method): room air           Eyes:MARIA TERESA, EOMI  Ear/Nose/Throat: no oral lesion, no sinus tenderness on percussion	  Neck:no JVD, no lymphadenopathy, supple  Respiratory: CTA ghislaine  Cardiovascular: S1S2 RRR, no murmurs  Gastrointestinal:soft, (+) BS, no HSM  Extremities:no e/e/c        LABS:                        8.7    4.11  )-----------( 312      ( 14 Jul 2024 07:07 )             27.7     07-14    135  |  103  |  17  ----------------------------<  94  3.5   |  20<L>  |  1.02    Ca    7.4<L>      14 Jul 2024 07:10        Urinalysis Basic - ( 14 Jul 2024 07:10 )    Color: x / Appearance: x / SG: x / pH: x  Gluc: 94 mg/dL / Ketone: x  / Bili: x / Urobili: x   Blood: x / Protein: x / Nitrite: x   Leuk Esterase: x / RBC: x / WBC x   Sq Epi: x / Non Sq Epi: x / Bacteria: x          MICROBIOLOGY:    RECENT CULTURES:  07-12 @ 12:32 .Stool Feces                No enteric pathogens to date: Final culture pending    07-12 @ 07:45 .Blood Blood-Venous                No growth at 24 hours    07-12 @ 07:40 .Blood Blood-Venous                No growth at 24 hours          RESPIRATORY CULTURES:      Clostridium difficile [facility] Toxins A&amp;B, EIA:   Positive (07-12 @ 13:07)          RADIOLOGY & ADDITIONAL STUDIES:        Pager [phone]  After 5 pm/weekends or if no response :[phone]

## 2024-07-14 NOTE — PROGRESS NOTE ADULT - ASSESSMENT
73-year-old male with past medical history of multiple myeloma on chemo, anemia, BPH, hypertension, hyperlipidemia, gout presenting with diarrhea.    Overall severe sepsis, fever, tachycardia, bandemia, sepsis due to C diff colitis. AGUS.   Abnormal U/A but pt denies any symptoms, self straight caths intermittently.   Immunocompromised state.      PLAN:    dificid  day 3    somewhat better  exam is soft  no sings of megacolon   BC and GI PCR negative    would advance diet

## 2024-07-14 NOTE — PROGRESS NOTE ADULT - SUBJECTIVE AND OBJECTIVE BOX
Date of Service: 07-14-24 @ 08:42           CARDIOLOGY     PROGRESS  NOTE   ________________________________________________    CHIEF COMPLAINT:Patient is a 73y old  Male who presents with a chief complaint of hypotention (14 Jul 2024 08:23)  doing better  	  REVIEW OF SYSTEMS:  CONSTITUTIONAL: No fever, weight loss, or fatigue  EYES: No eye pain, visual disturbances, or discharge  ENT:  No difficulty hearing, tinnitus, vertigo; No sinus or throat pain  NECK: No pain or stiffness  RESPIRATORY: No cough, wheezing, chills or hemoptysis; No Shortness of Breath  CARDIOVASCULAR: No chest pain, palpitations, passing out, dizziness, or leg swelling  GASTROINTESTINAL: No abdominal or epigastric pain. No nausea, vomiting, or hematemesis; less diarrhea . No melena or hematochezia.  GENITOURINARY: No dysuria, frequency, hematuria, or incontinence  NEUROLOGICAL: No headaches, memory loss, loss of strength, numbness, or tremors  SKIN: No itching, burning, rashes, or lesions   LYMPH Nodes: No enlarged glands  ENDOCRINE: No heat or cold intolerance; No hair loss  MUSCULOSKELETAL: No joint pain or swelling; No muscle, back, or extremity pain  PSYCHIATRIC: No depression, anxiety, mood swings, or difficulty sleeping  HEME/LYMPH: No easy bruising, or bleeding gums  ALLERGY AND IMMUNOLOGIC: No hives or eczema	    x[ ] All others negative	  [ ] Unable to obtain    PHYSICAL EXAM:  T(C): 36.9 (07-14-24 @ 04:35), Max: 36.9 (07-13-24 @ 20:23)  HR: 82 (07-14-24 @ 04:35) (82 - 99)  BP: 106/69 (07-14-24 @ 04:35) (102/56 - 115/70)  RR: 18 (07-14-24 @ 04:35) (18 - 18)  SpO2: 96% (07-14-24 @ 04:35) (96% - 98%)  Wt(kg): --  I&O's Summary    13 Jul 2024 07:01  -  14 Jul 2024 07:00  --------------------------------------------------------  IN: 1120 mL / OUT: 1320 mL / NET: -200 mL    14 Jul 2024 07:01  -  14 Jul 2024 08:42  --------------------------------------------------------  IN: 0 mL / OUT: 700 mL / NET: -700 mL        Appearance: Normal	  HEENT:   Normal oral mucosa, PERRL, EOMI	  Lymphatic: No lymphadenopathy  Cardiovascular: Normal S1 S2, No JVD, + murmurs, No edema  Respiratory: rhonchi  Psychiatry: A & O x 3, Mood & affect appropriate  Gastrointestinal:  Soft, Non-tender, + BS	  Skin: No rashes, No ecchymoses, No cyanosis	  Neurologic: Non-focal  Extremities: Normal range of motion, No clubbing, cyanosis or edema  Vascular: Peripheral pulses palpable 2+ bilaterally    MEDICATIONS  (STANDING):  allopurinol 100 milliGRAM(s) Oral daily  aspirin enteric coated 81 milliGRAM(s) Oral daily  calcium carbonate   1250 mG (OsCal) 1 Tablet(s) Oral daily  fidaxomicin 200 milliGRAM(s) Oral two times a day  finasteride 5 milliGRAM(s) Oral daily  gabapentin Solution 100 milliGRAM(s) Oral three times a day  metoprolol succinate ER 50 milliGRAM(s) Oral daily  sodium chloride 0.9%. 1000 milliLiter(s) (60 mL/Hr) IV Continuous <Continuous>  tamsulosin 0.4 milliGRAM(s) Oral at bedtime      TELEMETRY: 	    ECG:  	  RADIOLOGY:  OTHER: 	  	  LABS:	 	    CARDIAC MARKERS:                                8.7    4.11  )-----------( 312      ( 14 Jul 2024 07:07 )             27.7     07-14    135  |  103  |  17  ----------------------------<  94  3.5   |  20<L>  |  1.02    Ca    7.4<L>      14 Jul 2024 07:10      proBNP:   Lipid Profile:   HgA1c:   TSH: Thyroid Stimulating Hormone, Serum: 0.58 uIU/mL (06-20 @ 06:23)       dificid  day 3    somewhat better  exam is soft  no sings of megacolon   BC and GI PCR negative    would advance diet          Assessment and plan  ---------------------------  73-year-old male with past medical history of multiple myeloma on chemo, anemia, BPH, hypertension, hyperlipidemia, gout presenting with diarrhea.  Patient reports that he has had intermittent episodes of diarrhea for the past 6 weeks.  Patient was admitted to [facility] a month ago for UTI and hematuria.  During that admission C. difficile was negative x 2.  Patient states over the past few days diarrhea has progressively worsened.  Patient endorsing 6 episodes of watery diarrhea since last evening.  Patient states that he has been feeling generally weak and fatigued.  A few days ago he was able to work with physical therapy and was being scheduled to have a hip replacement, but today he is unable to even get out of bed secondary to fatigue.  Patient also endorsing fevers with Tmax of 101 yesterday.  Otherwise denies chest pain, shortness of breath, abdominal pain, vomiting, but does endorse poor appetite.  pt is well known to me with hx of htn, MM, cad, admitted with hematuria with c/o worsening diarrhea  r/o c diff  colitis, check stool culture  start on IVF  awaiting ct abdomen and pelvis  GI consult called  CAD/ decrease EF, pt will eventually needs cardiac cath  C diff +, ID gi noted appreciated  start on clear liquid diet  MM onc follow up as needed  CAD/ASHD, adjust cardiac meds, suspect sig cad, stable  continue ivf/ follow up cultures  ID appreciated  dvt prophylaxis  replete K

## 2024-07-15 LAB
ANION GAP SERPL CALC-SCNC: 10 MMOL/L — SIGNIFICANT CHANGE UP (ref 5–17)
BUN SERPL-MCNC: 11 MG/DL — SIGNIFICANT CHANGE UP (ref 7–23)
CALCIUM SERPL-MCNC: 7.9 MG/DL — LOW (ref 8.4–10.5)
CHLORIDE SERPL-SCNC: 109 MMOL/L — HIGH (ref 96–108)
CO2 SERPL-SCNC: 19 MMOL/L — LOW (ref 22–31)
CREAT SERPL-MCNC: 0.98 MG/DL — SIGNIFICANT CHANGE UP (ref 0.5–1.3)
EGFR: 80 ML/MIN/1.73M2 — SIGNIFICANT CHANGE UP
GLUCOSE SERPL-MCNC: 95 MG/DL — SIGNIFICANT CHANGE UP (ref 70–99)
HCT VFR BLD CALC: 28.6 % — LOW (ref 39–50)
HGB BLD-MCNC: 9.1 G/DL — LOW (ref 13–17)
MAGNESIUM SERPL-MCNC: 1.7 MG/DL — SIGNIFICANT CHANGE UP (ref 1.6–2.6)
MCHC RBC-ENTMCNC: 29.2 PG — SIGNIFICANT CHANGE UP (ref 27–34)
MCHC RBC-ENTMCNC: 31.8 GM/DL — LOW (ref 32–36)
MCV RBC AUTO: 91.7 FL — SIGNIFICANT CHANGE UP (ref 80–100)
NRBC # BLD: 0 /100 WBCS — SIGNIFICANT CHANGE UP (ref 0–0)
PLATELET # BLD AUTO: 366 K/UL — SIGNIFICANT CHANGE UP (ref 150–400)
POTASSIUM SERPL-MCNC: 3.9 MMOL/L — SIGNIFICANT CHANGE UP (ref 3.5–5.3)
POTASSIUM SERPL-SCNC: 3.9 MMOL/L — SIGNIFICANT CHANGE UP (ref 3.5–5.3)
RBC # BLD: 3.12 M/UL — LOW (ref 4.2–5.8)
RBC # FLD: 16 % — HIGH (ref 10.3–14.5)
SODIUM SERPL-SCNC: 138 MMOL/L — SIGNIFICANT CHANGE UP (ref 135–145)
WBC # BLD: 3.77 K/UL — LOW (ref 3.8–10.5)
WBC # FLD AUTO: 3.77 K/UL — LOW (ref 3.8–10.5)

## 2024-07-15 RX ORDER — FIDAXOMICIN 200 MG/5ML
1 GRANULE, FOR SUSPENSION ORAL
Qty: 14 | Refills: 0
Start: 2024-07-15 | End: 2024-07-21

## 2024-07-15 RX ADMIN — Medication 5 MILLIGRAM(S): at 12:02

## 2024-07-15 RX ADMIN — FIDAXOMICIN 200 MILLIGRAM(S): 200 GRANULE, FOR SUSPENSION ORAL at 06:37

## 2024-07-15 RX ADMIN — OXYCODONE HYDROCHLORIDE 10 MILLIGRAM(S): 100 SOLUTION ORAL at 20:51

## 2024-07-15 RX ADMIN — TAMSULOSIN HYDROCHLORIDE 0.4 MILLIGRAM(S): 0.4 CAPSULE ORAL at 20:51

## 2024-07-15 RX ADMIN — HEPARIN SODIUM 5000 UNIT(S): 50 INJECTION, SOLUTION INTRAVENOUS at 17:02

## 2024-07-15 RX ADMIN — Medication 50 MILLIGRAM(S): at 06:37

## 2024-07-15 RX ADMIN — HEPARIN SODIUM 5000 UNIT(S): 50 INJECTION, SOLUTION INTRAVENOUS at 06:37

## 2024-07-15 RX ADMIN — ASPIRIN 81 MILLIGRAM(S): 325 TABLET, FILM COATED ORAL at 12:02

## 2024-07-15 RX ADMIN — Medication 100 MILLIGRAM(S): at 20:51

## 2024-07-15 RX ADMIN — ALLOPURINOL 100 MILLIGRAM(S): 300 TABLET ORAL at 12:02

## 2024-07-15 RX ADMIN — OXYCODONE HYDROCHLORIDE 10 MILLIGRAM(S): 100 SOLUTION ORAL at 16:17

## 2024-07-15 RX ADMIN — OXYCODONE HYDROCHLORIDE 10 MILLIGRAM(S): 100 SOLUTION ORAL at 15:32

## 2024-07-15 RX ADMIN — Medication 100 MILLIGRAM(S): at 06:37

## 2024-07-15 RX ADMIN — Medication 100 MILLIGRAM(S): at 13:05

## 2024-07-15 RX ADMIN — FIDAXOMICIN 200 MILLIGRAM(S): 200 GRANULE, FOR SUSPENSION ORAL at 17:02

## 2024-07-15 RX ADMIN — Medication 1 TABLET(S): at 12:02

## 2024-07-15 NOTE — CONSULT NOTE ADULT - SUBJECTIVE AND OBJECTIVE BOX
HPI:  Date of Service, 07-15/24.   12/04  pm     HPI:  73-year-old male        with past medical history of multiple myeloma on chemo, anemia, BPH, hypertension, hyperlipidemia, gout       presenting with diarrhea. /   has had intermittent episodes of diarrhea for the past 6 weeks.         was admitted to   [facility],   a month ago for UTI and hematuria.  During that admission C. difficile was negative x 2.       Patient states over the past few days diarrhea has progressively worsened.  Patient endorsing 6 episodes of watery diarrhea /   was being scheduled   for  hip replacement,       also   had   fevers with Tmax of 101 yesterday.    denies chest pain, shortness of breath, abdominal pain, vomiting, but does endorse poor appetite.    PAST MEDICAL & SURGICAL HISTORY:  Hypertension  Hyperlipidemia  Multiple myeloma  Calculus of ureter  BPH (benign prostatic hyperplasia)  Lumbar stenosis  History of osteoporosis  Arthritis  Anemia  CAD, multiple vessel  History of colonoscopy      MEDICATIONS  (STANDING):  Drisdol 1.25 mg (50,000 intl units) oral capsule: 1 cap(s) orally once a week Please take weekly for 6 weeks  · 	metoprolol succinate 25 mg oral tablet, extended release: 1 tab(s) orally 2 times a day  · 	calcium carbonate 1250 mg (500 mg elemental calcium) oral tablet: 1 tab(s) orally 2 times a day  · 	calcitriol 0.5 mcg oral capsule: 1 cap(s) orally once a day  · 	gabapentin 250 mg/5 mL oral solution: 1 milliliter(s) orally 3 times a day  · 	tamsulosin 0.4 mg oral capsule: 1 cap(s) orally 2 times a day  · 	finasteride 5 mg oral tablet: 1 tab(s) orally once a day  · 	allopurinol 100 mg oral tablet: orally once a day  · 	aspirin 81 mg oral tablet: 1 tab(s) orally once a day    MEDICATIONS  (PRN):      FAMILY HISTORY:  Family history of ovarian cancer (Mother)    Family history of myocardial infarction (Father)        SOCIAL HISTORY:    [ x] Non-smoker  [ ] Smoker  [ ] Alcohol    Allergies    No Known Drug Allergies  walnut (Faint; Urticaria; Anaphylaxis (Severe); Short breath; Hives; Headache; Rash)    Intolerances    	    REVIEW OF SYSTEMS:  CONSTITUTIONAL: No fever, weight loss, or fatigue  EYES: No eye pain, visual disturbances, or discharge  ENT:  No difficulty hearing, tinnitus, vertigo; No sinus or throat pain  NECK: No pain or stiffness  RESPIRATORY: No cough, wheezing, chills or hemoptysis; No Shortness of Breath  CARDIOVASCULAR: No chest pain, palpitations, passing out, dizziness, or leg swelling  GASTROINTESTINAL: No abdominal or epigastric pain. No nausea, vomiting, or hematemesis; + diarrhea . No melena or hematochezia.  GENITOURINARY: No dysuria, frequency, hematuria, or incontinence  NEUROLOGICAL: No headaches, memory loss, loss of strength, numbness, or tremors  SKIN: No itching, burning, rashes, or lesions   LYMPH Nodes: No enlarged glands  ENDOCRINE: No heat or cold intolerance; No hair loss  MUSCULOSKELETAL: No joint pain or swelling; No muscle, back, or extremity pain  PSYCHIATRIC: No depression, anxiety, mood swings, or difficulty sleeping  HEME/LYMPH: No easy bruising, or bleeding gums  ALLERGY AND IMMUNOLOGIC: No hives or eczema	    [x ] All others negative	  [ ] Unable to obtain    PHYSICAL EXAM:  T(C): 38.3 (07-12-24 @ 08:35), Max: 38.3 (07-12-24 @ 08:35)  HR: 102 (07-12-24 @ 08:11) (92 - 102)  BP: 112/67 (07-12-24 @ 08:11) (93/59 - 112/67)  RR: 17 (07-12-24 @ 08:11) (17 - 18)  SpO2: 100% (07-12-24 @ 08:11) (96% - 100%)  Wt(kg): --  I&O's Summary      Appearance: Normal	  HEENT:   Normal oral mucosa, PERRL, EOMI	  Lymphatic: No lymphadenopathy  Cardiovascular: Normal S1 S2, No JVD, + murmurs, No edema  Respiratory: rhonchi  Psychiatry: A & O x 3, Mood & affect appropriate  Gastrointestinal:  Soft, Non-tender, + BS	  Skin: No rashes, No ecchymoses, No cyanosis	  Neurologic: Non-focal  Extremities: Normal range of motion, No clubbing, cyanosis or edema  Vascular: Peripheral pulses palpable 2+ bilaterally    TELEMETRY: 	    ECG:  	  RADIOLOGY:  OTHER: 	  	  LABS:	 	    CARDIAC MARKERS:                              10.8   3.83  )-----------( 254      ( 12 Jul 2024 07:52 )             34.1     07-12    132<L>  |  98  |  19  ----------------------------<  108<H>  3.9   |  19<L>  |  1.33<H>    Ca    8.0<L>      12 Jul 2024 07:52  Phos  1.8     07-12  Mg     1.7     07-12    TPro  6.0  /  Alb  3.3  /  TBili  0.4  /  DBili  x   /  AST  8<L>  /  ALT  8<L>  /  AlkPhos  54  07-12    proBNP:   Lipid Profile:   HgA1c:   TSH:       PREVIOUS DIAGNOSTIC TESTING:      < from: 12 Lead ECG (06.19.24 @ 17:07) >  Diagnosis Line SINUS RHYTHM WITH OCCASIONAL PREMATURE VENTRICULAR COMPLEXES AND PREMATURE ATRIAL COMPLEXES  POSSIBLE LEFT ATRIAL ENLARGEMENT  ANTEROSEPTAL INFARCT (CITED ON OR BEFORE 31-MAY-2022)  ABNORMAL ECG  WHEN COMPARED WITH ECG OF 19-JUN-2024 17:06, (UNCONFIRMED)  SIGNIFICANT CHANGES HAVE OCCURRED    < from: CT Abdomen and Pelvis No Cont (06.21.24 @ 10:29) >  No evidence of bleed on this noncontrast exam.    New mild left hydroureteronephrosis to the level of the bladder.   Unchanged mild right hydroureteronephrosis.         (12 Jul 2024 09:22)    Date of service: 07-15-24 @ 12:04    REVIEW OF SYSTEMS:  CONSTITUTIONAL: No fever,  no  weight loss  ENT:  No  tinnitus,   no   vertigo  NECK: No pain or stiffness  RESPIRATORY: No cough, wheezing, chills or hemoptysis;    No Shortness of Breath  CARDIOVASCULAR: No chest pain, palpitations, dizziness  GASTROINTESTINAL: No abdominal or epigastric pain. No nausea, vomiting, or hematemesis; No diarrhea  No melena or hematochezia.  GENITOURINARY: No dysuria, frequency, hematuria, or incontinence  NEUROLOGICAL: No headaches  SKIN: No itching,  no   rash  LYMPH Nodes: No enlarged glands  ENDOCRINE: No heat or cold intolerance  MUSCULOSKELETAL: No joint pain or swelling  PSYCHIATRIC: No depression, anxiety  HEME/LYMPH: No easy bruising, or bleeding gums  ALLERGY AND IMMUNOLOGIC: No hives or eczema	    Allergies    No Known Drug Allergies  walnut (Faint; Urticaria; Anaphylaxis (Severe); Short breath; Hives; Headache; Rash)    Intolerances        CAPILLARY BLOOD GLUCOSE        I&O's Summary    14 Jul 2024 07:01  -  15 Jul 2024 07:00  --------------------------------------------------------  IN: 1950 mL / OUT: 900 mL / NET: 1050 mL    15 Jul 2024 07:01  -  15 Jul 2024 12:04  --------------------------------------------------------  IN: 240 mL / OUT: 0 mL / NET: 240 mL        Vital Signs Last 24 Hrs  T(C): 36.9 (15 Jul 2024 11:27), Max: 37.1 (14 Jul 2024 20:43)  T(F): 98.5 (15 Jul 2024 11:27), Max: 98.7 (14 Jul 2024 20:43)  HR: 65 (15 Jul 2024 11:27) (58 - 87)  BP: 110/69 (15 Jul 2024 11:27) (110/69 - 122/69)  BP(mean): --  RR: 18 (15 Jul 2024 11:27) (18 - 18)  SpO2: 96% (15 Jul 2024 11:27) (96% - 97%)    Parameters below as of 15 Jul 2024 11:27  Patient On (Oxygen Delivery Method): room air        PHYSICAL EXAM:  Appearance: Normal	  HEENT:   Normal oral mucosa, PERRL, EOMI	  Lymphatic: No lymphadenopathy  Cardiovascular: Normal S1 S2, No JVD  Respiratory: Lungs clear to auscultation	  Psychiatry:    Mood & affect appropriate  Gastrointestinal:  Soft, Non-tender, + BS	  Skin: No rash, No ecchymoses	  Extremities: Normal range of motion  Vascular: Peripheral pulses palpable bilaterally    MEDICATIONS  (STANDING):  allopurinol 100 milliGRAM(s) Oral daily  aspirin enteric coated 81 milliGRAM(s) Oral daily  calcium carbonate   1250 mG (OsCal) 1 Tablet(s) Oral daily  fidaxomicin 200 milliGRAM(s) Oral two times a day  finasteride 5 milliGRAM(s) Oral daily  gabapentin Solution 100 milliGRAM(s) Oral three times a day  heparin   Injectable 5000 Unit(s) SubCutaneous every 12 hours  metoprolol succinate ER 50 milliGRAM(s) Oral daily  tamsulosin 0.4 milliGRAM(s) Oral at bedtime    MEDICATIONS  (PRN):  acetaminophen     Tablet .. 650 milliGRAM(s) Oral every 6 hours PRN Moderate Pain (4 - 6)  oxyCODONE    IR 10 milliGRAM(s) Oral every 6 hours PRN Severe Pain (7 - 10)    LABS:                        9.1    3.77  )-----------( 366      ( 15 Jul 2024 07:47 )             28.6     07-15    138  |  109<H>  |  11  ----------------------------<  95  3.9   |  19<L>  |  0.98    Ca    7.9<L>      15 Jul 2024 07:47  Mg     1.7     07-15            Urinalysis Basic - ( 15 Jul 2024 07:47 )    Color: x / Appearance: x / SG: x / pH: x  Gluc: 95 mg/dL / Ketone: x  / Bili: x / Urobili: x   Blood: x / Protein: x / Nitrite: x   Leuk Esterase: x / RBC: x / WBC x   Sq Epi: x / Non Sq Epi: x / Bacteria: x              Thyroid Stimulating Hormone, Serum: 0.58 uIU/mL (06-20 @ 06:23)          Consultant(s) Notes Reviewed:      Care Discussed with Consultants/Other Providers:  Plan:

## 2024-07-15 NOTE — PROGRESS NOTE ADULT - ASSESSMENT
73-year-old male with past medical history of multiple myeloma on chemo, anemia, BPH, hypertension, hyperlipidemia, gout presenting with diarrhea.  Patient reports that he has had intermittent episodes of "diarrhea" (described as loose, but not watery) for the past 6 weeks.  Patient was admitted to [facility] a month ago for UTI and hematuria.  During that admission C. difficile was negative x 2.  Patient states over the past few days diarrhea has progressively worsened.  Patient endorsing 6 episodes of watery mucoid, non bloody diarrhea over a 12 hour period with associated fever ([id number])    #MM and  hx AOCD - follows with Hem-Onc, on chemo  #Hx UTIs - recent Abx 6/2024  #Diarrhea with pancolitis 2/2 C diff-clinically improving  C. difficile + 7/12/24    RECS:  -Advance to LRD  -d/c IVF as pt tolerating PO well; monitor lytes and Cr  -Abx per ID recs  -monitor counts  -no indication for endoluminal evaluation at this time    Discussed with Medicine ACP and nursing  Discussed with pt and spouse; all questions answered    Sean Solano PA-C  [facility]  Available on TEAMS Mon-Fri 8a-4p  After hours and weekend coverage [phone]

## 2024-07-15 NOTE — CONSULT NOTE ADULT - ASSESSMENT
73-year-old male            h/o  M myeloma,   last  Revlimid,   2 weeks ago/  c/c  back  pain,  mlple  c/c   compression  fx's. .             BPH.  c/c  bladder outlet obstruction,.  c/c  anemia           kidney stone, urinary tract infection, BP.  on  starright  cath,   on baby aspirin/  s/p    hematuria   in   june    chapman  was  placed  by  er/  Ct  a/p,  bph,   mild  R  hydro,   no  stone     ##  Antibody screen  +  with   anti  CD  38, c/w  administration  of Daratumumab ,  requiring  full  cross  match./        admitted with  profuse   diarrhea     CT  ,  proctitis,  live r lesion     C diff  +>  seen by  ID          on  dificid    h/o  M Myeloma,             on revlimid/  on hold   for  now,   onc  [facility]/   oncology . dr flower sanchez s  low  wbc/  anemia     OA  ,  hips    HTN/  HLD         torpol,    on hold     Gout,  on allopurinol     BPH.  on flomax/  proscar           h/o  self cath   for past year ,  has  chapman.  now,   c/c  bladder  outlet  obstruction     c/o   c/c   pain left hip. .  h/o  OA  ,  b/l  hips.  L > R,    seen by  neuro  and  N/S,  no  intervention   no  clonus       xray,,  b/l OA/  CT   pelvis,  OA  b/; .   hips,  severe  OA  left  hip,  collapse  of  left  femoral  head.  c/c  fx'z lumbar  spine/  awiating  surg         on oxy  at  home/  severe  OA  hip     Echo  2022.  ef  46.  diastolic dysfunction/   WMA.            and   f/p  echo,   ef   41. with  WMA        persistent  hypocalcemia.,   known  to  endo    dr calero       pt  si  full  code       rad< from: CT Pelvis Bony Only No Cont (06.17.24 @ 11:44) >  Impression:  Severe left hip osteoarthrosis with significant flattening and focal   chronic collapse at the left femoral head.  Mild right hip arthrosis with chondrocalcinosis and a small right hip   joint effusion.  Chronic fracture deformities from L3 to L5 again noted.  --- End of Report ---       73-year-old male            h/o  M myeloma,   last  Revlimid,   2 weeks ago/  c/c  back  pain,  mlple  c/c   compression  fx's. .             BPH.  c/c  bladder outlet obstruction,.  c/c  anemia           kidney stone, urinary tract infection, BP.  on  starright  cath,   on baby aspirin/  s/p    hematuria   in   june    chapman  was  placed  by  er/  Ct  a/p,  bph,   mild  R  hydro,   no  stone     ##  Antibody screen  +  with   anti  CD  38, c/w  administration  of Daratumumab ,  requiring  full  cross  match./        admitted with  profuse   diarrhea/ sepsis  on arrival  from  [facility]     CT  ,  proctitis,  live r lesion     C diff  +>  seen by  ID          on  dificid    h/o  M Myeloma,             on revlimid/  on hold   for  now,   onc  [facility]/   oncology . dr flower sanchez s  low  wbc/  anemia     OA  ,  hips    HTN/  HLD         torpol,    on hold     Gout,  on allopurinol     BPH.  on flomax/  proscar           h/o  self cath   for past year ,  has  chapman.  now,   c/c  bladder  outlet  obstruction     c/o   c/c   pain left hip. .  h/o  OA  ,  b/l  hips.  L > R,    seen by  neuro  and  N/S,  no  intervention   no  clonus       xray,,  b/l OA/  CT   pelvis,  OA  b/; .   hips,  severe  OA  left  hip,  collapse  of  left  femoral  head.  c/c  fx'z lumbar  spine/  awiating  surg         on oxy  at  home/  severe  OA  hip     Echo  2022.  ef  46.  diastolic dysfunction/   WMA.            and   f/p  echo,   ef   41. with  WMA        persistent  hypocalcemia.,   known  to  endo    dr calero       pt  si  full  code       rad< from: CT Pelvis Bony Only No Cont (06.17.24 @ 11:44) >  Impression:  Severe left hip osteoarthrosis with significant flattening and focal   chronic collapse at the left femoral head.  Mild right hip arthrosis with chondrocalcinosis and a small right hip   joint effusion.  Chronic fracture deformities from L3 to L5 again noted.  --- End of Report ---

## 2024-07-15 NOTE — PROGRESS NOTE ADULT - SUBJECTIVE AND OBJECTIVE BOX
Date of Service: 07-15-24 @ 20:28           CARDIOLOGY     PROGRESS  NOTE   ________________________________________________    CHIEF COMPLAINT:Patient is a 73y old  Male who presents with a chief complaint of hypotension (15 Jul 2024 12:03)  no complain, doing better, decrease diarrhea  	  REVIEW OF SYSTEMS:  CONSTITUTIONAL: No fever, weight loss, or fatigue  EYES: No eye pain, visual disturbances, or discharge  ENT:  No difficulty hearing, tinnitus, vertigo; No sinus or throat pain  NECK: No pain or stiffness  RESPIRATORY: No cough, wheezing, chills or hemoptysis; No Shortness of Breath  CARDIOVASCULAR: No chest pain, palpitations, passing out, dizziness, or leg swelling  GASTROINTESTINAL: No abdominal or epigastric pain. No nausea, vomiting, or hematemesis; No diarrhea or constipation. No melena or hematochezia.  GENITOURINARY: No dysuria, frequency, hematuria, or incontinence  NEUROLOGICAL: No headaches, memory loss, loss of strength, numbness, or tremors  SKIN: No itching, burning, rashes, or lesions   LYMPH Nodes: No enlarged glands  ENDOCRINE: No heat or cold intolerance; No hair loss  MUSCULOSKELETAL: No joint pain or swelling; No muscle, back, or extremity pain  PSYCHIATRIC: No depression, anxiety, mood swings, or difficulty sleeping  HEME/LYMPH: No easy bruising, or bleeding gums  ALLERGY AND IMMUNOLOGIC: No hives or eczema	    [ x] All others negative	  [ ] Unable to obtain    PHYSICAL EXAM:  T(C): 36.7 (07-15-24 @ 20:07), Max: 37.1 (07-14-24 @ 20:43)  HR: 67 (07-15-24 @ 20:07) (58 - 87)  BP: 123/73 (07-15-24 @ 20:07) (110/69 - 123/73)  RR: 18 (07-15-24 @ 20:07) (18 - 18)  SpO2: 98% (07-15-24 @ 20:07) (96% - 98%)  Wt(kg): --  I&O's Summary    14 Jul 2024 07:01  -  15 Jul 2024 07:00  --------------------------------------------------------  IN: 1950 mL / OUT: 900 mL / NET: 1050 mL    15 Jul 2024 07:01  -  15 Jul 2024 20:28  --------------------------------------------------------  IN: 480 mL / OUT: 0 mL / NET: 480 mL        Appearance: Normal	  HEENT:   Normal oral mucosa, PERRL, EOMI	  Lymphatic: No lymphadenopathy  Cardiovascular: Normal S1 S2, No JVD, + murmurs, No edema  Respiratory: rhonchi  Psychiatry: A & O x 3, Mood & affect appropriate  Gastrointestinal:  Soft, Non-tender, + BS	  Skin: No rashes, No ecchymoses, No cyanosis	  Neurologic: Non-focal  Extremities: Normal range of motion, No clubbing, cyanosis or edema  Vascular: Peripheral pulses palpable 2+ bilaterally    MEDICATIONS  (STANDING):  allopurinol 100 milliGRAM(s) Oral daily  aspirin enteric coated 81 milliGRAM(s) Oral daily  calcium carbonate   1250 mG (OsCal) 1 Tablet(s) Oral daily  fidaxomicin 200 milliGRAM(s) Oral two times a day  finasteride 5 milliGRAM(s) Oral daily  gabapentin Solution 100 milliGRAM(s) Oral three times a day  heparin   Injectable 5000 Unit(s) SubCutaneous every 12 hours  metoprolol succinate ER 50 milliGRAM(s) Oral daily  tamsulosin 0.4 milliGRAM(s) Oral at bedtime      TELEMETRY: 	    ECG:  	  RADIOLOGY:  OTHER: 	  	  LABS:	 	    CARDIAC MARKERS:                                9.1    3.77  )-----------( 366      ( 15 Jul 2024 07:47 )             28.6     07-15    138  |  109<H>  |  11  ----------------------------<  95  3.9   |  19<L>  |  0.98    Ca    7.9<L>      15 Jul 2024 07:47  Mg     1.7     07-15      proBNP:   Lipid Profile:   HgA1c:   TSH: Thyroid Stimulating Hormone, Serum: 0.58 uIU/mL (06-20 @ 06:23)     dificid  day 3    somewhat better  exam is soft  no sings of megacolon   BC and GI PCR negative    would advance diet          Assessment and plan  ---------------------------  73-year-old male with past medical history of multiple myeloma on chemo, anemia, BPH, hypertension, hyperlipidemia, gout presenting with diarrhea.  Patient reports that he has had intermittent episodes of diarrhea for the past 6 weeks.  Patient was admitted to [facility] a month ago for UTI and hematuria.  During that admission C. difficile was negative x 2.  Patient states over the past few days diarrhea has progressively worsened.  Patient endorsing 6 episodes of watery diarrhea since last evening.  Patient states that he has been feeling generally weak and fatigued.  A few days ago he was able to work with physical therapy and was being scheduled to have a hip replacement, but today he is unable to even get out of bed secondary to fatigue.  Patient also endorsing fevers with Tmax of 101 yesterday.  Otherwise denies chest pain, shortness of breath, abdominal pain, vomiting, but does endorse poor appetite.  pt is well known to me with hx of htn, MM, cad, admitted with hematuria with c/o worsening diarrhea  r/o c diff  colitis, check stool culture  start on IVF  awaiting ct abdomen and pelvis  GI consult called  CAD/ decrease EF, pt will eventually needs cardiac cath  C diff +, ID gi noted appreciated  start on clear liquid diet  MM onc follow up as needed  CAD/ASHD, adjust cardiac meds, suspect sig cad, stable  continue ivf/ follow up cultures  ID appreciated continue meds, no abdominal pain  dvt prophylaxis  replete K, continue ivf  oob to chair

## 2024-07-16 LAB
-  AMPICILLIN: SIGNIFICANT CHANGE UP
-  CIPROFLOXACIN: SIGNIFICANT CHANGE UP
-  LEVOFLOXACIN: SIGNIFICANT CHANGE UP
-  NITROFURANTOIN: SIGNIFICANT CHANGE UP
-  TETRACYCLINE: SIGNIFICANT CHANGE UP
-  VANCOMYCIN: SIGNIFICANT CHANGE UP
ANION GAP SERPL CALC-SCNC: 11 MMOL/L — SIGNIFICANT CHANGE UP (ref 5–17)
BUN SERPL-MCNC: 11 MG/DL — SIGNIFICANT CHANGE UP (ref 7–23)
CALCIUM SERPL-MCNC: 8.6 MG/DL — SIGNIFICANT CHANGE UP (ref 8.4–10.5)
CHLORIDE SERPL-SCNC: 107 MMOL/L — SIGNIFICANT CHANGE UP (ref 96–108)
CO2 SERPL-SCNC: 19 MMOL/L — LOW (ref 22–31)
CREAT SERPL-MCNC: 1.07 MG/DL — SIGNIFICANT CHANGE UP (ref 0.5–1.3)
CULTURE RESULTS: ABNORMAL
EGFR: 72 ML/MIN/1.73M2 — SIGNIFICANT CHANGE UP
GLUCOSE SERPL-MCNC: 91 MG/DL — SIGNIFICANT CHANGE UP (ref 70–99)
HCT VFR BLD CALC: 29.2 % — LOW (ref 39–50)
HGB BLD-MCNC: 9.3 G/DL — LOW (ref 13–17)
MCHC RBC-ENTMCNC: 29.2 PG — SIGNIFICANT CHANGE UP (ref 27–34)
MCHC RBC-ENTMCNC: 31.8 GM/DL — LOW (ref 32–36)
MCV RBC AUTO: 91.8 FL — SIGNIFICANT CHANGE UP (ref 80–100)
METHOD TYPE: SIGNIFICANT CHANGE UP
NRBC # BLD: 0 /100 WBCS — SIGNIFICANT CHANGE UP (ref 0–0)
ORGANISM # SPEC MICROSCOPIC CNT: ABNORMAL
ORGANISM # SPEC MICROSCOPIC CNT: ABNORMAL
PLATELET # BLD AUTO: 426 K/UL — HIGH (ref 150–400)
POTASSIUM SERPL-MCNC: 4 MMOL/L — SIGNIFICANT CHANGE UP (ref 3.5–5.3)
POTASSIUM SERPL-SCNC: 4 MMOL/L — SIGNIFICANT CHANGE UP (ref 3.5–5.3)
RBC # BLD: 3.18 M/UL — LOW (ref 4.2–5.8)
RBC # FLD: 15.9 % — HIGH (ref 10.3–14.5)
SODIUM SERPL-SCNC: 137 MMOL/L — SIGNIFICANT CHANGE UP (ref 135–145)
SPECIMEN SOURCE: SIGNIFICANT CHANGE UP
WBC # BLD: 4.85 K/UL — SIGNIFICANT CHANGE UP (ref 3.8–10.5)
WBC # FLD AUTO: 4.85 K/UL — SIGNIFICANT CHANGE UP (ref 3.8–10.5)

## 2024-07-16 RX ORDER — ZINC OXIDE 20 %
1 OINTMENT (GRAM) TOPICAL
Refills: 0 | Status: DISCONTINUED | OUTPATIENT
Start: 2024-07-16 | End: 2024-07-19

## 2024-07-16 RX ADMIN — Medication 5 MILLIGRAM(S): at 11:52

## 2024-07-16 RX ADMIN — OXYCODONE HYDROCHLORIDE 10 MILLIGRAM(S): 100 SOLUTION ORAL at 20:40

## 2024-07-16 RX ADMIN — ASPIRIN 81 MILLIGRAM(S): 325 TABLET, FILM COATED ORAL at 11:52

## 2024-07-16 RX ADMIN — Medication 1 APPLICATION(S): at 17:02

## 2024-07-16 RX ADMIN — HEPARIN SODIUM 5000 UNIT(S): 50 INJECTION, SOLUTION INTRAVENOUS at 17:02

## 2024-07-16 RX ADMIN — Medication 100 MILLIGRAM(S): at 21:50

## 2024-07-16 RX ADMIN — Medication 1 TABLET(S): at 11:52

## 2024-07-16 RX ADMIN — OXYCODONE HYDROCHLORIDE 10 MILLIGRAM(S): 100 SOLUTION ORAL at 12:46

## 2024-07-16 RX ADMIN — HEPARIN SODIUM 5000 UNIT(S): 50 INJECTION, SOLUTION INTRAVENOUS at 05:21

## 2024-07-16 RX ADMIN — Medication 50 MILLIGRAM(S): at 05:21

## 2024-07-16 RX ADMIN — OXYCODONE HYDROCHLORIDE 10 MILLIGRAM(S): 100 SOLUTION ORAL at 11:52

## 2024-07-16 RX ADMIN — ALLOPURINOL 100 MILLIGRAM(S): 300 TABLET ORAL at 11:52

## 2024-07-16 RX ADMIN — Medication 100 MILLIGRAM(S): at 13:34

## 2024-07-16 RX ADMIN — TAMSULOSIN HYDROCHLORIDE 0.4 MILLIGRAM(S): 0.4 CAPSULE ORAL at 21:50

## 2024-07-16 RX ADMIN — OXYCODONE HYDROCHLORIDE 10 MILLIGRAM(S): 100 SOLUTION ORAL at 21:40

## 2024-07-16 RX ADMIN — Medication 1 APPLICATION(S): at 23:14

## 2024-07-16 RX ADMIN — Medication 100 MILLIGRAM(S): at 05:21

## 2024-07-16 RX ADMIN — FIDAXOMICIN 200 MILLIGRAM(S): 200 GRANULE, FOR SUSPENSION ORAL at 17:02

## 2024-07-16 RX ADMIN — FIDAXOMICIN 200 MILLIGRAM(S): 200 GRANULE, FOR SUSPENSION ORAL at 05:21

## 2024-07-16 NOTE — PROGRESS NOTE ADULT - SUBJECTIVE AND OBJECTIVE BOX
Date of Service: 07-16-24 @ 07:10           CARDIOLOGY     PROGRESS  NOTE   ________________________________________________    CHIEF COMPLAINT:Patient is a 73y old  Male who presents with a chief complaint of hypotension/  colitis (16 Jul 2024 07:01)  no complain, doing better  	  REVIEW OF SYSTEMS:  CONSTITUTIONAL: No fever, weight loss, or fatigue  EYES: No eye pain, visual disturbances, or discharge  ENT:  No difficulty hearing, tinnitus, vertigo; No sinus or throat pain  NECK: No pain or stiffness  RESPIRATORY: No cough, wheezing, chills or hemoptysis; No Shortness of Breath  CARDIOVASCULAR: No chest pain, palpitations, passing out, dizziness, or leg swelling  GASTROINTESTINAL: No abdominal or epigastric pain. No nausea, vomiting, or hematemesis; No diarrhea or constipation. No melena or hematochezia.  GENITOURINARY: No dysuria, frequency, hematuria, or incontinence  NEUROLOGICAL: No headaches, memory loss, loss of strength, numbness, or tremors  SKIN: No itching, burning, rashes, or lesions   LYMPH Nodes: No enlarged glands  ENDOCRINE: No heat or cold intolerance; No hair loss  MUSCULOSKELETAL: No joint pain or swelling; No muscle, back, or extremity pain  PSYCHIATRIC: No depression, anxiety, mood swings, or difficulty sleeping  HEME/LYMPH: No easy bruising, or bleeding gums  ALLERGY AND IMMUNOLOGIC: No hives or eczema	    [ x] All others negative	  [ ] Unable to obtain    PHYSICAL EXAM:  T(C): 37 (07-16-24 @ 04:10), Max: 37.2 (07-16-24 @ 01:13)  HR: 86 (07-16-24 @ 04:10) (65 - 86)  BP: 116/76 (07-16-24 @ 04:10) (110/69 - 133/71)  RR: 19 (07-16-24 @ 04:10) (18 - 19)  SpO2: 96% (07-16-24 @ 04:10) (96% - 98%)  Wt(kg): --  I&O's Summary    15 Jul 2024 07:01  -  16 Jul 2024 07:00  --------------------------------------------------------  IN: 1080 mL / OUT: 900 mL / NET: 180 mL        Appearance: Normal	  HEENT:   Normal oral mucosa, PERRL, EOMI	  Lymphatic: No lymphadenopathy  Cardiovascular: Normal S1 S2, No JVD, + murmurs, No edema  Respiratory:rhonchi  Psychiatry: A & O x 3, Mood & affect appropriate  Gastrointestinal:  Soft, Non-tender, + BS	  Skin: No rashes, No ecchymoses, No cyanosis	  Neurologic: Non-focal  Extremities: Normal range of motion, No clubbing, cyanosis or edema  Vascular: Peripheral pulses palpable 2+ bilaterally    MEDICATIONS  (STANDING):  allopurinol 100 milliGRAM(s) Oral daily  aspirin enteric coated 81 milliGRAM(s) Oral daily  calcium carbonate   1250 mG (OsCal) 1 Tablet(s) Oral daily  fidaxomicin 200 milliGRAM(s) Oral two times a day  finasteride 5 milliGRAM(s) Oral daily  gabapentin Solution 100 milliGRAM(s) Oral three times a day  heparin   Injectable 5000 Unit(s) SubCutaneous every 12 hours  metoprolol succinate ER 50 milliGRAM(s) Oral daily  tamsulosin 0.4 milliGRAM(s) Oral at bedtime      TELEMETRY: 	    ECG:  	  RADIOLOGY:  OTHER: 	  	  LABS:	 	    CARDIAC MARKERS:                                9.1    3.77  )-----------( 366      ( 15 Jul 2024 07:47 )             28.6     07-15    138  |  109<H>  |  11  ----------------------------<  95  3.9   |  19<L>  |  0.98    Ca    7.9<L>      15 Jul 2024 07:47  Mg     1.7     07-15      proBNP:   Lipid Profile:   HgA1c:   TSH: Thyroid Stimulating Hormone, Serum: 0.58 uIU/mL (06-20 @ 06:23)     rad< from: CT Pelvis Bony Only No Cont (06.17.24 @ 11:44) >  Impression:  Severe left hip osteoarthrosis with significant flattening and focal   chronic collapse at the left femoral head.  Mild right hip arthrosis with chondrocalcinosis and a small right hip   joint effusion.  Chronic fracture deformities from L3 to L5 again noted.    Assessment and plan  ---------------------------  73-year-old male with past medical history of multiple myeloma on chemo, anemia, BPH, hypertension, hyperlipidemia, gout presenting with diarrhea.  Patient reports that he has had intermittent episodes of diarrhea for the past 6 weeks.  Patient was admitted to [facility] a month ago for UTI and hematuria.  During that admission C. difficile was negative x 2.  Patient states over the past few days diarrhea has progressively worsened.  Patient endorsing 6 episodes of watery diarrhea since last evening.  Patient states that he has been feeling generally weak and fatigued.  A few days ago he was able to work with physical therapy and was being scheduled to have a hip replacement, but today he is unable to even get out of bed secondary to fatigue.  Patient also endorsing fevers with Tmax of 101 yesterday.  Otherwise denies chest pain, shortness of breath, abdominal pain, vomiting, but does endorse poor appetite.  pt is well known to me with hx of htn, MM, cad, admitted with hematuria with c/o worsening diarrhea  r/o c diff  colitis, check stool culture  start on IVF  awaiting ct abdomen and pelvis  GI consult called  CAD/ decrease EF, pt will eventually needs cardiac cath  C diff + ID gi noted appreciated  start on clear liquid diet  MM onc follow up as needed  CAD/ASHD, adjust cardiac meds, suspect sig cad, stable  continue ivf/ follow up cultures  ID appreciated continue meds, no abdominal pain  dvt prophylaxis  replete K, continue ivf  oob to chair, pain meds  ID follow up course of c diff meds  nutrition eval, severe protein deficiency

## 2024-07-16 NOTE — PROGRESS NOTE ADULT - SUBJECTIVE AND OBJECTIVE BOX
date of service: 07-16-24 @ 07:02  afbeirle  REVIEW OF SYSTEMS:  CONSTITUTIONAL: No fever,  no  weight loss  ENT:  No  tinnitus,   no   vertigo  NECK: No pain or stiffness  RESPIRATORY: No cough, wheezing, chills or hemoptysis;    No Shortness of Breath  CARDIOVASCULAR: No chest pain, palpitations, dizziness  GASTROINTESTINAL: No abdominal or epigastric pain. No nausea, vomiting, or hematemesis; No diarrhea  No melena or hematochezia.  GENITOURINARY: No dysuria, frequency, hematuria, or incontinence  NEUROLOGICAL: No headaches  SKIN: No itching,  no   rash  LYMPH Nodes: No enlarged glands  ENDOCRINE: No heat or cold intolerance  MUSCULOSKELETAL: No joint pain or swelling  PSYCHIATRIC: No depression, anxiety  HEME/LYMPH: No easy bruising, or bleeding gums  ALLERGY AND IMMUNOLOGIC: No hives or eczema	    MEDICATIONS  (STANDING):  allopurinol 100 milliGRAM(s) Oral daily  aspirin enteric coated 81 milliGRAM(s) Oral daily  calcium carbonate   1250 mG (OsCal) 1 Tablet(s) Oral daily  fidaxomicin 200 milliGRAM(s) Oral two times a day  finasteride 5 milliGRAM(s) Oral daily  gabapentin Solution 100 milliGRAM(s) Oral three times a day  heparin   Injectable 5000 Unit(s) SubCutaneous every 12 hours  metoprolol succinate ER 50 milliGRAM(s) Oral daily  tamsulosin 0.4 milliGRAM(s) Oral at bedtime    MEDICATIONS  (PRN):  acetaminophen     Tablet .. 650 milliGRAM(s) Oral every 6 hours PRN Moderate Pain (4 - 6)  oxyCODONE    IR 10 milliGRAM(s) Oral every 6 hours PRN Severe Pain (7 - 10)      Vital Signs Last 24 Hrs  T(C): 37 (16 Jul 2024 04:10), Max: 37.2 (16 Jul 2024 01:13)  T(F): 98.6 (16 Jul 2024 04:10), Max: 98.9 (16 Jul 2024 01:13)  HR: 86 (16 Jul 2024 04:10) (65 - 86)  BP: 116/76 (16 Jul 2024 04:10) (110/69 - 133/71)  BP(mean): --  RR: 19 (16 Jul 2024 04:10) (18 - 19)  SpO2: 96% (16 Jul 2024 04:10) (96% - 98%)    Parameters below as of 16 Jul 2024 04:10  Patient On (Oxygen Delivery Method): room air      CAPILLARY BLOOD GLUCOSE        I&O's Summary    15 Jul 2024 07:01  -  16 Jul 2024 07:00  --------------------------------------------------------  IN: 1080 mL / OUT: 900 mL / NET: 180 mL          Appearance: Normal	  HEENT:   Normal oral mucosa, PERRL, EOMI	  Lymphatic: No lymphadenopathy  Cardiovascular: Normal S1 S2, No JVD  Respiratory: Lungs clear to auscultation	  Gastrointestinal:  Soft, Non-tender, + BS	  Skin: No rash, No ecchymoses	  Extremities:     LABS:                        9.1    3.77  )-----------( 366      ( 15 Jul 2024 07:47 )             28.6     07-15    138  |  109<H>  |  11  ----------------------------<  95  3.9   |  19<L>  |  0.98    Ca    7.9<L>      15 Jul 2024 07:47  Mg     1.7     07-15            Urinalysis Basic - ( 15 Jul 2024 07:47 )    Color: x / Appearance: x / SG: x / pH: x  Gluc: 95 mg/dL / Ketone: x  / Bili: x / Urobili: x   Blood: x / Protein: x / Nitrite: x   Leuk Esterase: x / RBC: x / WBC x   Sq Epi: x / Non Sq Epi: x / Bacteria: x              Thyroid Stimulating Hormone, Serum: 0.58 uIU/mL (06-20 @ 06:23)          Consultant(s) Notes Reviewed:      Care Discussed with Consultants/Other Providers:

## 2024-07-16 NOTE — PROGRESS NOTE ADULT - ASSESSMENT
73-year-old male            h/o  M myeloma,   last  Revlimid,   2 weeks ago/  c/c  back  pain,  mlple  c/c   compression  fx's. .             BPH.  c/c  bladder outlet obstruction,.  c/c  anemia           kidney stone, urinary tract infection, BP.  on  starright  cath,   on baby aspirin/  s/p    hematuria   in   june    chapman  was  placed  by  er/  Ct  a/p,  bph,   mild  R  hydro,   no  stone     ##  Antibody screen  +  with   anti  CD  38, c/w  administration  of Daratumumab ,  requiring  full  cross  match./        admitted with  profuse   diarrhea/ sepsis  on arrival  from  [facility]     CT  ,  proctitis,  live r lesion      C diff  +>  seen by  ID          on  dificid    h/o  M Myeloma,             on revlimid/  on hold   for  now,   onc  [facility]/   oncology . dr abraham            has  low  wbc/  anemia     OA  ,  hips    HTN/  HLD         torpol,    on hold     Gout,  on allopurinol     BPH.  on flomax/  proscar           h/o  self cath   for past year ,   c/c  bladder  outlet  obstruction     c/o   c/c   pain left hip. .  h/o  OA  ,  b/l  hips.  L > R,    seen by  neuro  and  N/S,  no  intervention   no  clonus       xray,,  b/l OA/  CT   pelvis,  OA  b/; .   hips,  severe  OA  left  hip,  collapse  of  left  femoral  head.  c/c  fx'z lumbar  spine/  awiating  surg         on oxy  at  home/  severe  OA  hip     Echo  2022.  ef  46.  diastolic dysfunction/   WMA.            and   f/p  echo,   ef   41. with  WMA        persistent  hypocalcemia.,   known  to  endo    dr calero    diarrhea lessening.  afebrile,  ha s improved       pt  si  full  code       rad< from: CT Pelvis Bony Only No Cont (06.17.24 @ 11:44) >  Impression:  Severe left hip osteoarthrosis with significant flattening and focal   chronic collapse at the left femoral head.  Mild right hip arthrosis with chondrocalcinosis and a small right hip   joint effusion.  Chronic fracture deformities from L3 to L5 again noted.  --- End of Report ---       73-year-old male            h/o  M myeloma,   last  Revlimid,   2 weeks ago/  c/c  back  pain,  mlple  c/c   compression  fx's. .             BPH.  c/c  bladder outlet obstruction,.  c/c  anemia           kidney stone, urinary tract infection, BP.  on  starright  cath,   on baby aspirin/  s/p    hematuria   in   june    chapman  was  placed  by  er/  Ct  a/p,  bph,   mild  R  hydro,   no  stone     ##  Antibody screen  +  with   anti  CD  38, c/w  administration  of Daratumumab ,  requiring  full  cross  match./        admitted with  profuse   diarrhea/ sepsis  on arrival  from  [facility]     CT  ,  proctitis,  live r lesion      C diff  +>  seen by  ID          on  dificid    h/o  M Myeloma,             on revlimid/  on hold   for  now,   onc  [facility]/   oncology . dr abraham            has  low  wbc/  anemia     OA  ,  hips    HTN/  HLD         torpol,    on hold     Gout,  on allopurinol     BPH.  on flomax/  proscar           h/o  self cath   for past year ,   c/c  bladder  outlet  obstruction     c/o   c/c   pain left hip. .  h/o  OA  ,  b/l  hips.  L > R,    seen by  neuro  and  N/S,  no  intervention   no  clonus       xray,,  b/l OA/  CT   pelvis,  OA  b/; .   hips,  severe  OA  left  hip,  collapse  of  left  femoral  head.  c/c  fx'z lumbar  spine/  awiating  surg         on oxy  at  home/  severe  OA  hip     Echo  2022.  ef  46.  diastolic dysfunction/   WMA.            and   f/p  echo,   ef   41. with  WMA        persistent  hypocalcemia.,   known  to  endo    dr calero    diarrhea at night,  per pt  . had  about 10  episodes/  wife  at bedside       pt  si  full  code       rad< from: CT Pelvis Bony Only No Cont (06.17.24 @ 11:44) >  Impression:  Severe left hip osteoarthrosis with significant flattening and focal   chronic collapse at the left femoral head.  Mild right hip arthrosis with chondrocalcinosis and a small right hip   joint effusion.  Chronic fracture deformities from L3 to L5 again noted.  --- End of Report ---

## 2024-07-17 DIAGNOSIS — A04.72 ENTEROCOLITIS DUE TO CLOSTRIDIUM DIFFICILE, NOT SPECIFIED AS RECURRENT: ICD-10-CM

## 2024-07-17 LAB
CULTURE RESULTS: SIGNIFICANT CHANGE UP
SPECIMEN SOURCE: SIGNIFICANT CHANGE UP

## 2024-07-17 PROCEDURE — 99233 SBSQ HOSP IP/OBS HIGH 50: CPT

## 2024-07-17 PROCEDURE — 99232 SBSQ HOSP IP/OBS MODERATE 35: CPT

## 2024-07-17 RX ADMIN — Medication 50 MILLIGRAM(S): at 05:08

## 2024-07-17 RX ADMIN — HEPARIN SODIUM 5000 UNIT(S): 50 INJECTION, SOLUTION INTRAVENOUS at 17:03

## 2024-07-17 RX ADMIN — HEPARIN SODIUM 5000 UNIT(S): 50 INJECTION, SOLUTION INTRAVENOUS at 05:08

## 2024-07-17 RX ADMIN — Medication 5 MILLIGRAM(S): at 12:00

## 2024-07-17 RX ADMIN — Medication 100 MILLIGRAM(S): at 05:08

## 2024-07-17 RX ADMIN — Medication 1 APPLICATION(S): at 23:19

## 2024-07-17 RX ADMIN — Medication 100 MILLIGRAM(S): at 13:27

## 2024-07-17 RX ADMIN — Medication 100 MILLIGRAM(S): at 22:12

## 2024-07-17 RX ADMIN — Medication 1 APPLICATION(S): at 05:08

## 2024-07-17 RX ADMIN — FIDAXOMICIN 200 MILLIGRAM(S): 200 GRANULE, FOR SUSPENSION ORAL at 17:03

## 2024-07-17 RX ADMIN — Medication 1 APPLICATION(S): at 12:01

## 2024-07-17 RX ADMIN — FIDAXOMICIN 200 MILLIGRAM(S): 200 GRANULE, FOR SUSPENSION ORAL at 05:08

## 2024-07-17 RX ADMIN — TAMSULOSIN HYDROCHLORIDE 0.4 MILLIGRAM(S): 0.4 CAPSULE ORAL at 22:12

## 2024-07-17 RX ADMIN — Medication 1 TABLET(S): at 12:00

## 2024-07-17 RX ADMIN — OXYCODONE HYDROCHLORIDE 10 MILLIGRAM(S): 100 SOLUTION ORAL at 20:52

## 2024-07-17 RX ADMIN — Medication 1 APPLICATION(S): at 17:06

## 2024-07-17 RX ADMIN — ASPIRIN 81 MILLIGRAM(S): 325 TABLET, FILM COATED ORAL at 12:01

## 2024-07-17 RX ADMIN — ALLOPURINOL 100 MILLIGRAM(S): 300 TABLET ORAL at 12:00

## 2024-07-17 RX ADMIN — OXYCODONE HYDROCHLORIDE 10 MILLIGRAM(S): 100 SOLUTION ORAL at 21:52

## 2024-07-17 NOTE — PROGRESS NOTE ADULT - ASSESSMENT
73-year-old male with past medical history of multiple myeloma on chemo, anemia, BPH, hypertension, hyperlipidemia, gout presenting with diarrhea dur to C. difficile.   Overall appears to be improving on fidaxomicin  Benign abdomen  Blood cx NTD  Straight caths but no urinary symptoms per se- would not direct treatment vs. urine.   Mild AGUS, improved  Myeloma on Rx    Patient's wife with multiple questions, which I answered to the best of my ability  Strict contact precautions. As the spores of C. difficile are widely disseminated (e.g. bed rails, drapes, beside table, TV remote) it is necessary to  gown and glove for all contact with the patient or the room environment. Precautions may be discontinued when the patient has clinically improved and the patient has had no diarrhea for 48 hours.  Upon exiting the room hygeine with soap and water is required rather than alcohol gel.  Once the patient has vacated the room, it must be terminally cleaned per infection control protocol.  "Test of cure" with PCR or other methodology during or after a course of treatment is not indicated. In the absence of active symptoms, a positive assay does not mean the patient has active disease, nor does it predict recurrence. Conversely, a negative assay does not preclude potential for recurrence.   Overall the risk of recurrence is around 20%. This typically occurs within 10 days of stopping antibiotics, but in the absence of superimposed precipitating factors recurrence present up to 10 weeks after the initial episode. Persons who have a single recurrence have up to a 35% chance of having another one. Perosns with >=3 recurrence have a 65% chance of additional recurrences. Risk factors for recurrence include age >65,  =1 prior episode of C. difficile, immunosuppression, severe disease, NAP1/B1/027 strain, and renal impariemt.  The benefit of priobiotics in active C. difficile is uncertain. The data is limited, with few studies sufficiently powered to determine any effect.  There may be some benefit in nonsevere, recurrent disease; but elsewhere the data is lacking (e.g. severe disease typically excluded from trials). In general probiotics are safe, though there are reports of bloodstream infection associated with the use of Lactobacillus or Sacchromyces boulardii. PO lactobacillius commonly used, I recommend Greek yogurt or Kefir.   With respect to household transmission this was looked at by Mir et al in J. Infect in April 2012. In ~1500 household contacts, 8 secondary cases of C. difficile were identified. This number dropped to zero 3 months after the index case was diagnosed.    Continue Dificid  F/U stool output and consistency  Would not treat bacteriuria    Rory Berg MD  Attending Physician  [facility]  Division of Infectious Diseases  [phone]    > 55 min total time spent

## 2024-07-17 NOTE — CONSULT NOTE ADULT - ASSESSMENT
73-year-old male with multiple myeloma. Has had a VGPR to treatment with DRD. On hold here in setting of infection. He is scheduled to het Faspro in a couple of weeks in the office. Will tentatively keep that scheduled and see how he recovers from C diff infection. Hold Revlimid here.    Chronic anemia due to MM and CKD and now with infection. Hgb adequate and no need for a transfusion at this time.    Thrombocytosis is mild and is reactive.

## 2024-07-17 NOTE — PROGRESS NOTE ADULT - ASSESSMENT
73-year-old male with past medical history of multiple myeloma on chemo, anemia, BPH, hypertension, hyperlipidemia, gout presenting with diarrhea.  Patient reports that he has had intermittent episodes of "diarrhea" (described as loose, but not watery) for the past 6 weeks.  Patient was admitted to [facility] a month ago for UTI and hematuria.  During that admission C. difficile was negative x 2.  Patient states over the past few days diarrhea has progressively worsened.  Patient endorsing 6 episodes of watery mucoid, non bloody diarrhea over a 12 hour period with associated fever ([id number])    #MM and  hx AOCD - follows with Hem-Onc, on chemo  #Hx UTIs - recent Abx 6/2024  #Diarrhea with pancolitis 2/2 C diff-clinically improving  C. difficile + 7/12/24    RECS:  -continue PO LRD  -Abx per ID recs  -monitor counts. Per spouse outpt chemo on hold (appt for this week cancelled). Continue to hold chemo until fully recovered from  C diff infection.    Discussed with Medicine ACP and nursing  No GI objection to DC planning  Reviewed hand hygiene with pt and spouse - wash with soap and water. Wash all clothes/bedding in hot water setting (use bleach if possible) and clean bathroom with bleach based cleaning agent  Discussed with pt and spouse; all questions answered    Sean Solano PA-C  [facility] Teaching GI Service  Available on TEAMS Mon-Fri 8a-4p  After hours and weekend coverage [phone]

## 2024-07-17 NOTE — PROGRESS NOTE ADULT - SUBJECTIVE AND OBJECTIVE BOX
INTERVAL HPI/OVERNIGHT EVENTS:  BM x 3 yesterday  no BM overnight; 1 BM earlier this AM - loose, but not watery    no abdominal pain, nausea or vomiting  no fever or chills    wife brought in catheter supplies from home - more comfortable for pt  pt and spouse eager to go home  appetite good/improving    MEDICATIONS  (STANDING):  allopurinol 100 milliGRAM(s) Oral daily  aspirin enteric coated 81 milliGRAM(s) Oral daily  calcium carbonate   1250 mG (OsCal) 1 Tablet(s) Oral daily  fidaxomicin 200 milliGRAM(s) Oral two times a day  finasteride 5 milliGRAM(s) Oral daily  gabapentin Solution 100 milliGRAM(s) Oral three times a day  heparin   Injectable 5000 Unit(s) SubCutaneous every 12 hours  metoprolol succinate ER 50 milliGRAM(s) Oral daily  tamsulosin 0.4 milliGRAM(s) Oral at bedtime  zinc oxide 40% Paste 1 Application(s) Topical four times a day    MEDICATIONS  (PRN):  acetaminophen     Tablet .. 650 milliGRAM(s) Oral every 6 hours PRN Moderate Pain (4 - 6)  oxyCODONE    IR 10 milliGRAM(s) Oral every 6 hours PRN Severe Pain (7 - 10)      Allergies  No Known Drug Allergies  walnut (Faint; Urticaria; Anaphylaxis (Severe); Short breath; Hives; Headache; Rash)    Review of Systems:  see HPI- remainder 10 point ROS negative    Vital Signs Last 24 Hrs  T(C): 37 (17 Jul 2024 04:35), Max: 37.3 (16 Jul 2024 19:42)  T(F): 98.6 (17 Jul 2024 04:35), Max: 99.2 (16 Jul 2024 19:42)  HR: 68 (17 Jul 2024 04:35) (61 - 88)  BP: 124/74 (17 Jul 2024 04:35) (120/75 - 125/72)  BP(mean): --  RR: 18 (17 Jul 2024 04:35) (18 - 18)  SpO2: 97% (17 Jul 2024 04:35) (97% - 97%)    Parameters below as of 17 Jul 2024 04:35  Patient On (Oxygen Delivery Method): room air    PHYSICAL EXAM:  Constitutional: NAD, elderly WM  non toxic appearing; alert and responsive. Spouse at bedside.  Neck: No LAD, supple no JVD  Respiratory: grossly clear bl  no inc WOB  Cardiovascular: S1 and S2, regular  Gastrointestinal: BS+, soft ND,  NT no rebound guarding or rigidity  Extremities: No peripheral edema, neg clubbing, cyanosis  Vascular: 2+ peripheral pulses  Neurological: A/O x 3, no focal asymmetry  Psychiatric: Normal mood, normal affect  Skin: No rashes, anicteric good turgor    LABS:                        9.3    4.85  )-----------( 426      ( 16 Jul 2024 07:27 )             29.2     07-16    137  |  107  |  11  ----------------------------<  91  4.0   |  19<L>  |  1.07    Ca    8.6      16 Jul 2024 07:24          RADIOLOGY & ADDITIONAL TESTS:

## 2024-07-17 NOTE — PROGRESS NOTE ADULT - SUBJECTIVE AND OBJECTIVE BOX
date of service: 07-17-24 @ 06:46  [facility]  REVIEW OF SYSTEMS:  CONSTITUTIONAL: No fever,  no  weight loss  ENT:  No  tinnitus,   no   vertigo  NECK: No pain or stiffness  RESPIRATORY: No cough, wheezing, chills or hemoptysis;    No Shortness of Breath  CARDIOVASCULAR: No chest pain, palpitations, dizziness  GASTROINTESTINAL: No abdominal or epigastric pain. No nausea, vomiting, or hematemesis; No diarrhea  No melena or hematochezia.  GENITOURINARY: No dysuria, frequency, hematuria, or incontinence  NEUROLOGICAL: No headaches  SKIN: No itching,  no   rash  LYMPH Nodes: No enlarged glands  ENDOCRINE: No heat or cold intolerance  MUSCULOSKELETAL: No joint pain or swelling  PSYCHIATRIC: No depression, anxiety  HEME/LYMPH: No easy bruising, or bleeding gums  ALLERGY AND IMMUNOLOGIC: No hives or eczema	    MEDICATIONS  (STANDING):  allopurinol 100 milliGRAM(s) Oral daily  aspirin enteric coated 81 milliGRAM(s) Oral daily  calcium carbonate   1250 mG (OsCal) 1 Tablet(s) Oral daily  fidaxomicin 200 milliGRAM(s) Oral two times a day  finasteride 5 milliGRAM(s) Oral daily  gabapentin Solution 100 milliGRAM(s) Oral three times a day  heparin   Injectable 5000 Unit(s) SubCutaneous every 12 hours  metoprolol succinate ER 50 milliGRAM(s) Oral daily  tamsulosin 0.4 milliGRAM(s) Oral at bedtime  zinc oxide 40% Paste 1 Application(s) Topical four times a day    MEDICATIONS  (PRN):  acetaminophen     Tablet .. 650 milliGRAM(s) Oral every 6 hours PRN Moderate Pain (4 - 6)  oxyCODONE    IR 10 milliGRAM(s) Oral every 6 hours PRN Severe Pain (7 - 10)      Vital Signs Last 24 Hrs  T(C): 37 (17 Jul 2024 04:35), Max: 37.3 (16 Jul 2024 19:42)  T(F): 98.6 (17 Jul 2024 04:35), Max: 99.2 (16 Jul 2024 19:42)  HR: 68 (17 Jul 2024 04:35) (61 - 88)  BP: 124/74 (17 Jul 2024 04:35) (120/75 - 125/72)  BP(mean): --  RR: 18 (17 Jul 2024 04:35) (18 - 18)  SpO2: 97% (17 Jul 2024 04:35) (97% - 97%)    Parameters below as of 17 Jul 2024 04:35  Patient On (Oxygen Delivery Method): room air      CAPILLARY BLOOD GLUCOSE        I&O's Summary    15 Jul 2024 07:01  -  16 Jul 2024 07:00  --------------------------------------------------------  IN: 1080 mL / OUT: 900 mL / NET: 180 mL    16 Jul 2024 07:01  -  17 Jul 2024 06:46  --------------------------------------------------------  IN: 480 mL / OUT: 850 mL / NET: -370 mL          Appearance: Normal	  HEENT:   Normal oral mucosa, PERRL, EOMI	  Lymphatic: No lymphadenopathy  Cardiovascular: Normal S1 S2, No JVD  Respiratory: Lungs clear to auscultation	  Gastrointestinal:  Soft, Non-tender, + BS	  Skin: No rash, No ecchymoses	  Extremities:     LABS:                        9.3    4.85  )-----------( 426      ( 16 Jul 2024 07:27 )             29.2     07-16    137  |  107  |  11  ----------------------------<  91  4.0   |  19<L>  |  1.07    Ca    8.6      16 Jul 2024 07:24  Mg     1.7     07-15            Urinalysis Basic - ( 16 Jul 2024 07:24 )    Color: x / Appearance: x / SG: x / pH: x  Gluc: 91 mg/dL / Ketone: x  / Bili: x / Urobili: x   Blood: x / Protein: x / Nitrite: x   Leuk Esterase: x / RBC: x / WBC x   Sq Epi: x / Non Sq Epi: x / Bacteria: x              Thyroid Stimulating Hormone, Serum: 0.58 uIU/mL (06-20 @ 06:23)          Consultant(s) Notes Reviewed:      Care Discussed with Consultants/Other Providers:

## 2024-07-17 NOTE — PROGRESS NOTE ADULT - ASSESSMENT
73-year-old male            h/o  M myeloma,   last  Revlimid,   2 weeks ago/  c/c  back  pain,  mlple  c/c   compression  fx's. .             BPH.  c/c  bladder outlet obstruction,.  c/c  anemia           kidney stone, urinary tract infection, BP.  on  starright  cath,   on baby aspirin/  s/p    hematuria   in   june    chapman  was  placed  by  er/  Ct  a/p,  bph,   mild  R  hydro,   no  stone     ##  Antibody screen  +  with   anti  CD  38, c/w  administration  of Daratumumab ,  requiring  full  cross  match./        admitted with  profuse   diarrhea/ sepsis  on arrival  from  [facility]     CT  ,  proctitis,  live r lesion      C diff  +>  seen by  ID          on  dificid    h/o  M Myeloma,             on revlimid/  on hold   for  now,   onc  [facility]/   oncology . dr abraham            has  low  wbc/  anemia     OA  ,  hips    HTN/  HLD         torpol,    on hold     Gout,  on allopurinol     BPH.  on flomax/  proscar           h/o  self cath   for past year ,   c/c  bladder  outlet  obstruction     c/o   c/c   pain left hip. .  h/o  OA  ,  b/l  hips.  L > R,    seen by  neuro  and  N/S,  no  intervention   no  clonus       xray,,  b/l OA/  CT   pelvis,  OA  b/; .   hips,  severe  OA  left  hip,  collapse  of  left  femoral  head.  c/c  fx'z lumbar  spine/  awiating  surg         on oxy  at  home/  severe  OA  hip     Echo  2022.  ef  46.  diastolic dysfunction/   WMA.            and   f/p  echo,   ef   41. with  WMA        persistent  hypocalcemia.,   known  to  [facility]    dr calero    diarrhea at night, /  remains  afebrile, on  rx  fidaxomicin       pt  si  full  code       rad< from: CT Pelvis Bony Only No Cont (06.17.24 @ 11:44) >  Impression:  Severe left hip osteoarthrosis with significant flattening and focal   chronic collapse at the left femoral head.  Mild right hip arthrosis with chondrocalcinosis and a small right hip   joint effusion.  Chronic fracture deformities from L3 to L5 again noted.  --- End of Report ---

## 2024-07-17 NOTE — CONSULT NOTE ADULT - SUBJECTIVE AND OBJECTIVE BOX
Chief Complaint:  Patient is a 73y old  Male who presents with a chief complaint of hypotention (17 Jul 2024 13:14)      HPI: Patient known. He has multiple myeloma. He is being treated with Indy and Revlimid and Decadron (DRD) and he has had a good response. He was in the hospital last month with a UTI. IT was treated with antibiotics. He was seen in the office 1-2 weeks ago. He was having diarrhea and had told him to check for C diff but he did not. He was anemic and that has been chronic and due to the MM and CKD. The HGb was rising. He was scheduled to have hip surgery but he is admitted here with C diff and it was postponed. He is being treated with Fidaxomicin. He says that the diarrhea is improving,     Medications:  acetaminophen     Tablet .. 650 milliGRAM(s) Oral every 6 hours PRN  allopurinol 100 milliGRAM(s) Oral daily  aspirin enteric coated 81 milliGRAM(s) Oral daily  calcium carbonate   1250 mG (OsCal) 1 Tablet(s) Oral daily  fidaxomicin 200 milliGRAM(s) Oral two times a day  finasteride 5 milliGRAM(s) Oral daily  gabapentin Solution 100 milliGRAM(s) Oral three times a day  heparin   Injectable 5000 Unit(s) SubCutaneous every 12 hours  metoprolol succinate ER 50 milliGRAM(s) Oral daily  oxyCODONE    IR 10 milliGRAM(s) Oral every 6 hours PRN  tamsulosin 0.4 milliGRAM(s) Oral at bedtime  zinc oxide 40% Paste 1 Application(s) Topical four times a day        Allergies:  No Known Drug Allergies  walnut (Faint; Urticaria; Anaphylaxis (Severe); Short breath; Hives; Headache; Rash)    FAMILY HISTORY:  Family history of ovarian cancer (Mother)    Family history of myocardial infarction (Father)        Social history: . No tobacco, ETOH or IVDA    PAST MEDICAL & SURGICAL HISTORY:  Hypertension      Hyperlipidemia      Multiple myeloma      Calculus of ureter      BPH (benign prostatic hyperplasia)      Lumbar stenosis      History of osteoporosis      Arthritis      Anemia      CAD, multiple vessel      History of colonoscopy    REVIEW OF SYSTEMS      General: Fatigue. Decreased appetite. NO fever or sweats    Skin/Breast: No rash, itch  	  ENMT:	No hearing loss, nosebleeds, sore throat    Respiratory and Thorax: No cough, SOB, wheezing, hemoptysis   	  Cardiovascular:	NO CP    Gastrointestinal:	No abd pain, N/V, BRBPR    Genitourinary:	NO hematuria    Musculoskeletal:	 Chronic back and hip pain    Neurological:	No HA or dizziness    Vitals:  Vital Signs Last 24 Hrs  T(C): 36.8 (17 Jul 2024 11:34), Max: 37.3 (16 Jul 2024 19:42)  T(F): 98.2 (17 Jul 2024 11:34), Max: 99.2 (16 Jul 2024 19:42)  HR: 71 (17 Jul 2024 11:34) (61 - 71)  BP: 114/66 (17 Jul 2024 11:34) (114/66 - 125/72)  BP(mean): --  RR: 18 (17 Jul 2024 11:34) (18 - 18)  SpO2: 99% (17 Jul 2024 11:34) (97% - 99%)    Parameters below as of 17 Jul 2024 11:34  Patient On (Oxygen Delivery Method): room air        Pex:  alert NAD  EOMI anicteric sclera  Neck No LNA  Cv s1 S2 RRR  Lungs clear B/L  abd soft NT ND +BS  No LE edema or tenderness    Labs:                        9.3    4.85  )-----------( 426      ( 16 Jul 2024 07:27 )             29.2     CBC Full  -  ( 16 Jul 2024 07:27 )  WBC Count : 4.85 K/uL  RBC Count : 3.18 M/uL  Hemoglobin : 9.3 g/dL  Hematocrit : 29.2 %  Platelet Count - Automated : 426 K/uL  Mean Cell Volume : 91.8 fl  Mean Cell Hemoglobin : 29.2 pg  Mean Cell Hemoglobin Concentration : 31.8 gm/dL  Auto Neutrophil # : x  Auto Lymphocyte # : x  Auto Monocyte # : x  Auto Eosinophil # : x  Auto Basophil # : x  Auto Neutrophil % : x  Auto Lymphocyte % : x  Auto Monocyte % : x  Auto Eosinophil % : x  Auto Basophil % : x    07-16    137  |  107  |  11  ----------------------------<  91  4.0   |  19<L>  |  1.07    Ca    8.6      16 Jul 2024 07:24        [id number]

## 2024-07-17 NOTE — PROGRESS NOTE ADULT - SUBJECTIVE AND OBJECTIVE BOX
[facility]  Division of Infectious Diseases  [phone]    Name: JESSEE LERMA  Age: 73y  Gender: Male  MRN: [id number]    Interval History--  Notes reviewed.     Past Medical History--  Hypertension    Hyperlipidemia    Multiple myeloma    Calculus of ureter    BPH (benign prostatic hyperplasia)    Lumbar stenosis    History of osteoporosis    Arthritis    Anemia    CAD, multiple vessel    History of colonoscopy        For details regarding the patient's social history, family history, and other miscellaneous elements, please refer the initial infectious diseases consultation and/or the admitting history and physical examination for this admission.    Allergies    No Known Drug Allergies  walnut (Faint; Urticaria; Anaphylaxis (Severe); Short breath; Hives; Headache; Rash)    Intolerances        Medications--  Antibiotics:  fidaxomicin 200 milliGRAM(s) Oral two times a day    Immunologic:    Other:  acetaminophen     Tablet .. PRN  allopurinol  aspirin enteric coated  calcium carbonate   1250 mG (OsCal)  finasteride  gabapentin Solution  heparin   Injectable  metoprolol succinate ER  oxyCODONE    IR PRN  tamsulosin  zinc oxide 40% Paste      Review of Systems--  A 10-point review of systems was obtained.     Pertinent positives and negatives--  Constitutional: No fevers. No Chills. No Rigors.   Cardiovascular: No chest pain. No palpitations.  Respiratory: No shortness of breath. No cough.  Gastrointestinal: No nausea or vomiting. No diarrhea or constipation.   Psychiatric: Pleasant. Appropriate affect.    Review of systems otherwise negative except as previously noted.    Physical Examination--  Vital Signs: T(F): 98.2 (07-17-24 @ 11:34), Max: 99.2 (07-16-24 @ 19:42)  HR: 71 (07-17-24 @ 11:34)  BP: 114/66 (07-17-24 @ 11:34)  RR: 18 (07-17-24 @ 11:34)  SpO2: 99% (07-17-24 @ 11:34)  Wt(kg): --  General: Nontoxic-appearing Male in no acute distress.  HEENT: AT/NC. PERRL. EOMI. Anicteric. Conjunctiva pink and moist. Oropharynx clear. Dentition fair.  Neck: Not rigid. No sense of mass.  Nodes: None palpable.  Lungs: Clear bilaterally without rales, wheezing or rhonchi  Heart: Regular rate and rhythm. No Murmur. No rub. No gallop. No palpable thrill.  Abdomen: Bowel sounds present and normoactive. Soft. Nondistended. Nontender. No sense of mass. No organomegaly.  Back: No spinal tenderness. No costovertebral angle tenderness.   Extremities: No cyanosis or clubbing. No edema.   Skin: Warm. Dry. Good turgor. No rash. No vasculitic stigmata.  Psychiatric: Appropriate affect and mood for situation.         Laboratory Studies--  CBC                        9.3    4.85  )-----------( 426      ( 16 Jul 2024 07:27 )             29.2       Chemistries  07-16    137  |  107  |  11  ----------------------------<  91  4.0   |  19<L>  |  1.07    Ca    8.6      16 Jul 2024 07:24        Culture Data    Culture - Stool (collected 12 Jul 2024 12:32)  Source: .Stool Feces  Final Report (14 Jul 2024 18:30):    No enteric pathogens isolated.    (Stool culture examined for Salmonella,    Shigella, Campylobacter, Aeromonas, Plesiomonas,    Vibrio, E.coli O157 and Yersinia)    Culture - Urine (collected 12 Jul 2024 08:16)  Source: Catheterized Catheterized  Final Report (16 Jul 2024 14:01):    >100,000 CFU/ml Enterococcus faecalis  Organism: Enterococcus faecalis (16 Jul 2024 14:01)  Organism: Enterococcus faecalis (16 Jul 2024 14:01)    Culture - Blood (collected 12 Jul 2024 07:45)  Source: .Blood Blood-Venous  Preliminary Report (16 Jul 2024 15:01):    No growth at 4 days    Culture - Blood (collected 12 Jul 2024 07:40)  Source: .Blood Blood-Venous  Preliminary Report (16 Jul 2024 15:01):    No growth at 4 days             [facility]  Division of Infectious Diseases  [phone]    Name: JESSEE LERMA  Age: 73y  Gender: Male  MRN: [id number]    Covering Dr. Garcia    Interval History--  Notes reviewed. Feeling better. Still frequent stools, but less so. And sounds as if the stool is a mostly Ripley-5 type. Denies pain. Worried about straight cathing, would prefer to urinate spontaneously as much as possible. Tolerating PO. No fevers, chills, or rigors.     Past Medical History--  Hypertension    Hyperlipidemia    Multiple myeloma    Calculus of ureter    BPH (benign prostatic hyperplasia)    Lumbar stenosis    History of osteoporosis    Arthritis    Anemia    CAD, multiple vessel    History of colonoscopy        For details regarding the patient's social history, family history, and other miscellaneous elements, please refer the initial infectious diseases consultation and/or the admitting history and physical examination for this admission.    Allergies    No Known Drug Allergies  walnut (Faint; Urticaria; Anaphylaxis (Severe); Short breath; Hives; Headache; Rash)    Intolerances        Medications--  Antibiotics:  fidaxomicin 200 milliGRAM(s) Oral two times a day    Immunologic:    Other:  acetaminophen     Tablet .. PRN  allopurinol  aspirin enteric coated  calcium carbonate   1250 mG (OsCal)  finasteride  gabapentin Solution  heparin   Injectable  metoprolol succinate ER  oxyCODONE    IR PRN  tamsulosin  zinc oxide 40% Paste      Review of Systems--  A 10-point review of systems was obtained.   Review of systems otherwise negative except as previously noted.    Physical Examination--  Vital Signs: T(F): 98.2 (07-17-24 @ 11:34), Max: 99.2 (07-16-24 @ 19:42)  HR: 71 (07-17-24 @ 11:34)  BP: 114/66 (07-17-24 @ 11:34)  RR: 18 (07-17-24 @ 11:34)  SpO2: 99% (07-17-24 @ 11:34)  Wt(kg): --  General: Nontoxic-appearing Male in no acute distress  HEENT: Sometimes mask-like facies, at other times facial expression is better. Anicteric. Conjunctiva pink and moist.   Neck: Not rigid. No sense of mass.  Nodes: None palpable.  Lungs: Diminished BS B no RWR  Heart: Regular rate and rhythm.  Abdomen: Bowel sounds present and normoactive. Soft. Mildly distended. Nontender.  Extremities: No cyanosis or clubbing. No edema.   Skin: Warm. Dry. Good turgor.  Psychiatric: Grossly appropriate affect and mood for situation.         Laboratory Studies--  CBC                        9.3    4.85  )-----------( 426      ( 16 Jul 2024 07:27 )             29.2       Chemistries  07-16    137  |  107  |  11  ----------------------------<  91  4.0   |  19<L>  |  1.07    1.07 mg/dL (07-16-24 @ 07:24)  0.98 mg/dL (07-15-24 @ 07:47)  1.02 mg/dL (07-14-24 @ 07:10)  1.17 mg/dL (07-13-24 @ 07:33)  1.33 mg/dL (07-12-24 @ 07:52)      Ca    8.6      16 Jul 2024 07:24        Culture Data    Culture - Stool (collected 12 Jul 2024 12:32)  Source: .Stool Feces  Final Report (14 Jul 2024 18:30):    No enteric pathogens isolated.    (Stool culture examined for Salmonella,    Shigella, Campylobacter, Aeromonas, Plesiomonas,    Vibrio, E.coli O157 and Yersinia)    Culture - Urine (collected 12 Jul 2024 08:16)  Source: Catheterized Catheterized  Final Report (16 Jul 2024 14:01):    >100,000 CFU/ml Enterococcus faecalis  Organism: Enterococcus faecalis (16 Jul 2024 14:01)  Organism: Enterococcus faecalis (16 Jul 2024 14:01)    Culture - Blood (collected 12 Jul 2024 07:45)  Source: .Blood Blood-Venous  Preliminary Report (16 Jul 2024 15:01):    No growth at 4 days    Culture - Blood (collected 12 Jul 2024 07:40)  Source: .Blood Blood-Venous  Preliminary Report (16 Jul 2024 15:01):    No growth at 4 days

## 2024-07-17 NOTE — PROGRESS NOTE ADULT - SUBJECTIVE AND OBJECTIVE BOX
Date of Service: 07-17-24 @ 07:54           CARDIOLOGY     PROGRESS  NOTE   ________________________________________________    CHIEF COMPLAINT:Patient is a 73y old  Male who presents with a chief complaint of hypotention (17 Jul 2024 06:46)  +diarrhea  	  REVIEW OF SYSTEMS:  CONSTITUTIONAL: No fever, weight loss, or fatigue  EYES: No eye pain, visual disturbances, or discharge  ENT:  No difficulty hearing, tinnitus, vertigo; No sinus or throat pain  NECK: No pain or stiffness  RESPIRATORY: No cough, wheezing, chills or hemoptysis; No Shortness of Breath  CARDIOVASCULAR: No chest pain, palpitations, passing out, dizziness, or leg swelling  GASTROINTESTINAL: No abdominal or epigastric pain. No nausea, vomiting, or hematemesis; No diarrhea or constipation. No melena or hematochezia.  GENITOURINARY: No dysuria, frequency, hematuria, or incontinence  NEUROLOGICAL: No headaches, memory loss, loss of strength, numbness, or tremors  SKIN: No itching, burning, rashes, or lesions   LYMPH Nodes: No enlarged glands  ENDOCRINE: No heat or cold intolerance; No hair loss  MUSCULOSKELETAL: No joint pain or swelling; No muscle, back, or extremity pain  PSYCHIATRIC: No depression, anxiety, mood swings, or difficulty sleeping  HEME/LYMPH: No easy bruising, or bleeding gums  ALLERGY AND IMMUNOLOGIC: No hives or eczema	    [x ] All others negative	  [ ] Unable to obtain    PHYSICAL EXAM:  T(C): 37 (07-17-24 @ 04:35), Max: 37.3 (07-16-24 @ 19:42)  HR: 68 (07-17-24 @ 04:35) (61 - 88)  BP: 124/74 (07-17-24 @ 04:35) (120/75 - 125/72)  RR: 18 (07-17-24 @ 04:35) (18 - 18)  SpO2: 97% (07-17-24 @ 04:35) (97% - 97%)  Wt(kg): --  I&O's Summary    16 Jul 2024 07:01  -  17 Jul 2024 07:00  --------------------------------------------------------  IN: 480 mL / OUT: 850 mL / NET: -370 mL        Appearance: Normal	  HEENT:   Normal oral mucosa, PERRL, EOMI	  Lymphatic: No lymphadenopathy  Cardiovascular: Normal S1 S2, No JVD, + murmurs, No edema  Respiratory: rhonchi  Psychiatry: A & O x 3, Mood & affect appropriate  Gastrointestinal:  Soft, Non-tender, + BS	  Skin: No rashes, No ecchymoses, No cyanosis	  Neurologic: Non-focal  Extremities: Normal range of motion, No clubbing, cyanosis or edema  Vascular: Peripheral pulses palpable 2+ bilaterally    MEDICATIONS  (STANDING):  allopurinol 100 milliGRAM(s) Oral daily  aspirin enteric coated 81 milliGRAM(s) Oral daily  calcium carbonate   1250 mG (OsCal) 1 Tablet(s) Oral daily  fidaxomicin 200 milliGRAM(s) Oral two times a day  finasteride 5 milliGRAM(s) Oral daily  gabapentin Solution 100 milliGRAM(s) Oral three times a day  heparin   Injectable 5000 Unit(s) SubCutaneous every 12 hours  metoprolol succinate ER 50 milliGRAM(s) Oral daily  tamsulosin 0.4 milliGRAM(s) Oral at bedtime  zinc oxide 40% Paste 1 Application(s) Topical four times a day      TELEMETRY: 	    ECG:  	  RADIOLOGY:  OTHER: 	  	  LABS:	 	    CARDIAC MARKERS:                          9.3    4.85  )-----------( 426      ( 16 Jul 2024 07:27 )             29.2     07-16    137  |  107  |  11  ----------------------------<  91  4.0   |  19<L>  |  1.07    Ca    8.6      16 Jul 2024 07:24      proBNP:   Lipid Profile:   HgA1c:   TSH: Thyroid Stimulating Hormone, Serum: 0.58 uIU/mL (06-20 @ 06:23)      Assessment and plan  ---------------------------  73-year-old male with past medical history of multiple myeloma on chemo, anemia, BPH, hypertension, hyperlipidemia, gout presenting with diarrhea.  Patient reports that he has had intermittent episodes of diarrhea for the past 6 weeks.  Patient was admitted to [facility] a month ago for UTI and hematuria.  During that admission C. difficile was negative x 2.  Patient states over the past few days diarrhea has progressively worsened.  Patient endorsing 6 episodes of watery diarrhea since last evening.  Patient states that he has been feeling generally weak and fatigued.  A few days ago he was able to work with physical therapy and was being scheduled to have a hip replacement, but today he is unable to even get out of bed secondary to fatigue.  Patient also endorsing fevers with Tmax of 101 yesterday.  Otherwise denies chest pain, shortness of breath, abdominal pain, vomiting, but does endorse poor appetite.  pt is well known to me with hx of htn, MM, cad, admitted with hematuria with c/o worsening diarrhea  r/o c diff  colitis, check stool culture  start on IVF  awaiting ct abdomen and pelvis  GI consult called  CAD/ decrease EF, pt will eventually needs cardiac cath  C diff + ID gi noted appreciated  start on clear liquid diet  MM onc follow up as needed  CAD/ASHD, adjust cardiac meds, suspect sig cad, stable  continue ivf/ follow up cultures  ID appreciated continue meds, no abdominal pain  dvt prophylaxis  replete K, continue ivf  oob to chair, pain meds  ID follow up course of c diff meds  nutrition eval, severe protein deficiency  still with diarrhea, will discuss with ID  oob to the chair

## 2024-07-18 ENCOUNTER — APPOINTMENT (OUTPATIENT)
Dept: UROLOGY | Facility: CLINIC | Age: 74
End: 2024-07-18

## 2024-07-18 LAB
ANION GAP SERPL CALC-SCNC: 13 MMOL/L — SIGNIFICANT CHANGE UP (ref 5–17)
BUN SERPL-MCNC: 10 MG/DL — SIGNIFICANT CHANGE UP (ref 7–23)
CALCIUM SERPL-MCNC: 8.3 MG/DL — LOW (ref 8.4–10.5)
CHLORIDE SERPL-SCNC: 106 MMOL/L — SIGNIFICANT CHANGE UP (ref 96–108)
CO2 SERPL-SCNC: 22 MMOL/L — SIGNIFICANT CHANGE UP (ref 22–31)
CREAT SERPL-MCNC: 1.1 MG/DL — SIGNIFICANT CHANGE UP (ref 0.5–1.3)
EGFR: 70 ML/MIN/1.73M2 — SIGNIFICANT CHANGE UP
GLUCOSE SERPL-MCNC: 81 MG/DL — SIGNIFICANT CHANGE UP (ref 70–99)
HCT VFR BLD CALC: 30.4 % — LOW (ref 39–50)
HGB BLD-MCNC: 9.6 G/DL — LOW (ref 13–17)
MCHC RBC-ENTMCNC: 29 PG — SIGNIFICANT CHANGE UP (ref 27–34)
MCHC RBC-ENTMCNC: 31.6 GM/DL — LOW (ref 32–36)
MCV RBC AUTO: 91.8 FL — SIGNIFICANT CHANGE UP (ref 80–100)
NRBC # BLD: 0 /100 WBCS — SIGNIFICANT CHANGE UP (ref 0–0)
PLATELET # BLD AUTO: 460 K/UL — HIGH (ref 150–400)
POTASSIUM SERPL-MCNC: 3.4 MMOL/L — LOW (ref 3.5–5.3)
POTASSIUM SERPL-SCNC: 3.4 MMOL/L — LOW (ref 3.5–5.3)
RBC # BLD: 3.31 M/UL — LOW (ref 4.2–5.8)
RBC # FLD: 15.8 % — HIGH (ref 10.3–14.5)
SODIUM SERPL-SCNC: 141 MMOL/L — SIGNIFICANT CHANGE UP (ref 135–145)
WBC # BLD: 5.06 K/UL — SIGNIFICANT CHANGE UP (ref 3.8–10.5)
WBC # FLD AUTO: 5.06 K/UL — SIGNIFICANT CHANGE UP (ref 3.8–10.5)

## 2024-07-18 PROCEDURE — 99232 SBSQ HOSP IP/OBS MODERATE 35: CPT

## 2024-07-18 RX ORDER — POTASSIUM CHLORIDE 600 MG/1
40 TABLET, FILM COATED, EXTENDED RELEASE ORAL ONCE
Refills: 0 | Status: COMPLETED | OUTPATIENT
Start: 2024-07-18 | End: 2024-07-18

## 2024-07-18 RX ADMIN — Medication 1 TABLET(S): at 11:47

## 2024-07-18 RX ADMIN — HEPARIN SODIUM 5000 UNIT(S): 50 INJECTION, SOLUTION INTRAVENOUS at 05:04

## 2024-07-18 RX ADMIN — Medication 1 APPLICATION(S): at 05:04

## 2024-07-18 RX ADMIN — POTASSIUM CHLORIDE 40 MILLIEQUIVALENT(S): 600 TABLET, FILM COATED, EXTENDED RELEASE ORAL at 17:00

## 2024-07-18 RX ADMIN — TAMSULOSIN HYDROCHLORIDE 0.4 MILLIGRAM(S): 0.4 CAPSULE ORAL at 21:15

## 2024-07-18 RX ADMIN — HEPARIN SODIUM 5000 UNIT(S): 50 INJECTION, SOLUTION INTRAVENOUS at 17:01

## 2024-07-18 RX ADMIN — Medication 100 MILLIGRAM(S): at 15:47

## 2024-07-18 RX ADMIN — OXYCODONE HYDROCHLORIDE 10 MILLIGRAM(S): 100 SOLUTION ORAL at 18:00

## 2024-07-18 RX ADMIN — Medication 100 MILLIGRAM(S): at 05:05

## 2024-07-18 RX ADMIN — FIDAXOMICIN 200 MILLIGRAM(S): 200 GRANULE, FOR SUSPENSION ORAL at 17:01

## 2024-07-18 RX ADMIN — Medication 1 APPLICATION(S): at 11:47

## 2024-07-18 RX ADMIN — Medication 50 MILLIGRAM(S): at 05:05

## 2024-07-18 RX ADMIN — OXYCODONE HYDROCHLORIDE 10 MILLIGRAM(S): 100 SOLUTION ORAL at 23:18

## 2024-07-18 RX ADMIN — Medication 1 APPLICATION(S): at 17:01

## 2024-07-18 RX ADMIN — FIDAXOMICIN 200 MILLIGRAM(S): 200 GRANULE, FOR SUSPENSION ORAL at 05:04

## 2024-07-18 RX ADMIN — Medication 100 MILLIGRAM(S): at 21:15

## 2024-07-18 RX ADMIN — Medication 1 APPLICATION(S): at 23:19

## 2024-07-18 RX ADMIN — ALLOPURINOL 100 MILLIGRAM(S): 300 TABLET ORAL at 11:47

## 2024-07-18 RX ADMIN — ASPIRIN 81 MILLIGRAM(S): 325 TABLET, FILM COATED ORAL at 11:46

## 2024-07-18 RX ADMIN — OXYCODONE HYDROCHLORIDE 10 MILLIGRAM(S): 100 SOLUTION ORAL at 16:57

## 2024-07-18 RX ADMIN — Medication 5 MILLIGRAM(S): at 11:46

## 2024-07-18 NOTE — PROGRESS NOTE ADULT - ASSESSMENT
73-year-old male with multiple myeloma. Has had a VGPR to treatment with DRD. On hold here in setting of infection. He is scheduled to het Faspro in a couple of weeks in the office. Will tentatively keep that scheduled and see how he recovers from C diff infection. Hold Revlimid here.    Chronic anemia due to MM and CKD and now with infection. Hgb adequate and no need for a transfusion at this time.    Thrombocytosis is mild and is reactive.     inpt mgmt as per med

## 2024-07-18 NOTE — PROGRESS NOTE ADULT - ASSESSMENT
73-year-old male            h/o  M myeloma,   last  Revlimid,   2 weeks ago/  c/c  back  pain,  mlple  c/c   compression  fx's. .             BPH.  c/c  bladder outlet obstruction,.  c/c  anemia           kidney stone, urinary tract infection, BP.  on  starright  cath,   on baby aspirin/  s/p    hematuria   in   june    chapman  was  placed  by  er/  Ct  a/p,  bph,   mild  R  hydro,   no  stone     ##  Antibody screen  +  with   anti  CD  38, c/w  administration  of Daratumumab ,  requiring  full  cross  match./        admitted with  profuse   diarrhea/ sepsis  on arrival  from  c  diff      CT  , proctitis,  liver lesion   h/o  M Myeloma,             on revlimid/  on hold   for  now,   onc  [facility]/   oncology . dr abraham            has  low  wbc/  anemia     OA  ,  hips    HTN/  HLD         torpol,    on hold     Gout,  on allopurinol     BPH.  on flomax/  proscar           h/o  self cath   for past year ,   c/c  bladder  outlet  obstruction     c/o   c/c   pain left hip. .  h/o  OA  ,  b/l  hips.  L > R,    seen by  neuro  and  N/S,  no  intervention   no  clonus       xray,,  b/l OA/  CT   pelvis,  OA  b/; .   hips,  severe  OA  left  hip,  collapse  of  left  femoral  head.  c/c  fx'z lumbar  spine/  awiating  surg         on oxy  at  home/  severe  OA  hip     Echo  2022.  ef  46.  diastolic dysfunction/   WMA.            and   f/p  echo,   ef   41. with  WMA        persistent  hypocalcemia.,   known  to  [facility]    dr galilea nagel  stool  out  put,   afebrile, on  rx  fidaxomicin.  per  ID        pt  si  full  code       rad< from: CT Pelvis Bony Only No Cont (06.17.24 @ 11:44) >  Impression:  Severe left hip osteoarthrosis with significant flattening and focal   chronic collapse at the left femoral head.  Mild right hip arthrosis with chondrocalcinosis and a small right hip   joint effusion.  Chronic fracture deformities from L3 to L5 again noted.  --- End of Report ---

## 2024-07-18 NOTE — DIETITIAN NUTRITION RISK NOTIFICATION - TREATMENT: THE FOLLOWING DIET HAS BEEN RECOMMENDED
Diet, Low Fiber:   Supplement Feeding Modality:  Oral  Ensure Plus High Protein Cans or Servings Per Day:  2       Frequency:  Daily (07-18-24 @ 17:45) [Pending Verification By Attending]  Diet, Low Fiber (07-15-24 @ 10:19) [Active]

## 2024-07-18 NOTE — DIETITIAN INITIAL EVALUATION ADULT - REASON FOR ADMISSION
Per chart, patient is a 72 y/o male with PMH including MM (on chemotherapy), anemia, BPH, HTN, HLD, gout. Patient presents to [facility] w/ intermittent episodes of diarrhea x6 weeks PTA. GI consulted; identified w/ pancolitis 2/2 CDiff per GI, started on antibiotic regimen, ID following for management.

## 2024-07-18 NOTE — DIETITIAN INITIAL EVALUATION ADULT - ADD RECOMMEND
1. continue current diet as tolerated of: low fiber diet  2. encourage PO intake small/frequent portions, protein source with each meal, adequate fluid intake, supplement intake  3. recommend addition of Ensure Plus HP BID for extra caloric/protein support  4. request updated phosphorus level with next labs and replete as appropriate  5. monitor PO intake, weight trend, electrolytes, blood glucose levels, labs, BMs

## 2024-07-18 NOTE — DIETITIAN INITIAL EVALUATION ADULT - PERTINENT LABORATORY DATA
07-18    141  |  106  |  10  ----------------------------<  81  3.4<L>   |  22  |  1.10    Ca    8.3<L>      18 Jul 2024 13:03

## 2024-07-18 NOTE — PROGRESS NOTE ADULT - SUBJECTIVE AND OBJECTIVE BOX
Date of Service: 07-18-24 @ 07:55           CARDIOLOGY     PROGRESS  NOTE   ________________________________________________    CHIEF COMPLAINT:Patient is a 73y old  Male who presents with a chief complaint of hypotention (18 Jul 2024 07:27)  doing better  	  REVIEW OF SYSTEMS:  CONSTITUTIONAL: No fever, weight loss, or fatigue  EYES: No eye pain, visual disturbances, or discharge  ENT:  No difficulty hearing, tinnitus, vertigo; No sinus or throat pain  NECK: No pain or stiffness  RESPIRATORY: No cough, wheezing, chills or hemoptysis; No Shortness of Breath  CARDIOVASCULAR: No chest pain, palpitations, passing out, dizziness, or leg swelling  GASTROINTESTINAL: No abdominal or epigastric pain. No nausea, vomiting, or hematemesis; + diarrhea . No melena or hematochezia.  GENITOURINARY: No dysuria, frequency, hematuria, or incontinence  NEUROLOGICAL: No headaches, memory loss, loss of strength, numbness, or tremors  SKIN: No itching, burning, rashes, or lesions   LYMPH Nodes: No enlarged glands  ENDOCRINE: No heat or cold intolerance; No hair loss  MUSCULOSKELETAL: No joint pain or swelling; No muscle, back, or extremity pain  PSYCHIATRIC: No depression, anxiety, mood swings, or difficulty sleeping  HEME/LYMPH: No easy bruising, or bleeding gums  ALLERGY AND IMMUNOLOGIC: No hives or eczema	    [ x] All others negative	  [ ] Unable to obtain    PHYSICAL EXAM:  T(C): 37.1 (07-18-24 @ 04:10), Max: 37.1 (07-18-24 @ 04:10)  HR: 87 (07-18-24 @ 04:10) (71 - 87)  BP: 126/64 (07-18-24 @ 04:10) (114/66 - 126/64)  RR: 18 (07-18-24 @ 04:10) (18 - 18)  SpO2: 94% (07-18-24 @ 04:10) (94% - 99%)  Wt(kg): --  I&O's Summary    17 Jul 2024 07:01  -  18 Jul 2024 07:00  --------------------------------------------------------  IN: 600 mL / OUT: 900 mL / NET: -300 mL        Appearance: Normal	  HEENT:   Normal oral mucosa, PERRL, EOMI	  Lymphatic: No lymphadenopathy  Cardiovascular: Normal S1 S2, No JVD, + murmurs, No edema  Respiratory: Lungs clear to auscultation	  Psychiatry: A & O x 3, Mood & affect appropriate  Gastrointestinal:  Soft, Non-tender, + BS	  Skin: No rashes, No ecchymoses, No cyanosis	  Neurologic: Non-focal  Extremities: Normal range of motion, No clubbing, cyanosis or edema  Vascular: Peripheral pulses palpable 2+ bilaterally    MEDICATIONS  (STANDING):  allopurinol 100 milliGRAM(s) Oral daily  aspirin enteric coated 81 milliGRAM(s) Oral daily  calcium carbonate   1250 mG (OsCal) 1 Tablet(s) Oral daily  fidaxomicin 200 milliGRAM(s) Oral two times a day  finasteride 5 milliGRAM(s) Oral daily  gabapentin Solution 100 milliGRAM(s) Oral three times a day  heparin   Injectable 5000 Unit(s) SubCutaneous every 12 hours  metoprolol succinate ER 50 milliGRAM(s) Oral daily  tamsulosin 0.4 milliGRAM(s) Oral at bedtime  zinc oxide 40% Paste 1 Application(s) Topical four times a day      TELEMETRY: 	    ECG:  	  RADIOLOGY:  OTHER: 	  	  LABS:	 	    CARDIAC MARKERS:        proBNP:   Lipid Profile:   HgA1c:   TSH: Thyroid Stimulating Hormone, Serum: 0.58 uIU/mL (06-20 @ 06:23)    Continue Dificid  F/U stool output and consistency  Would not treat bacteriuria        Discussed with Medicine ACP and nursing  No GI objection to DC planning  Reviewed hand hygiene with pt and spouse - wash with soap and water. Wash all clothes/bedding in hot water setting (use bleach if possible) and clean bathroom with bleach based cleaning agent  Discussed with pt and spouse; all questions answered      Assessment and plan  ---------------------------  73-year-old male with past medical history of multiple myeloma on chemo, anemia, BPH, hypertension, hyperlipidemia, gout presenting with diarrhea.  Patient reports that he has had intermittent episodes of diarrhea for the past 6 weeks.  Patient was admitted to [facility] a month ago for UTI and hematuria.  During that admission C. difficile was negative x 2.  Patient states over the past few days diarrhea has progressively worsened.  Patient endorsing 6 episodes of watery diarrhea since last evening.  Patient states that he has been feeling generally weak and fatigued.  A few days ago he was able to work with physical therapy and was being scheduled to have a hip replacement, but today he is unable to even get out of bed secondary to fatigue.  Patient also endorsing fevers with Tmax of 101 yesterday.  Otherwise denies chest pain, shortness of breath, abdominal pain, vomiting, but does endorse poor appetite.  pt is well known to me with hx of htn, MM, cad, admitted with hematuria with c/o worsening diarrhea  r/o c diff  colitis, check stool culture  start on IVF  awaiting ct abdomen and pelvis  GI consult called  CAD/ decrease EF, pt will eventually needs cardiac cath  C diff + ID gi noted appreciated  start on clear liquid diet  MM onc follow up as needed  CAD/ASHD, adjust cardiac meds, suspect sig cad, stable  continue ivf/ follow up cultures  ID appreciated continue meds, no abdominal pain  dvt prophylaxis  replete K, continue ivf  oob to chair, pain meds  ID follow up course of c diff meds  nutrition eval, severe protein deficiency  still with diarrhea, will discuss with ID  oob to the chair  ID and GI appreciated, dc planning if possible

## 2024-07-18 NOTE — PROGRESS NOTE ADULT - SUBJECTIVE AND OBJECTIVE BOX
JESSEE LERMA  MRN-[id number]    Patient is a 73y old  Male who presents with a chief complaint of hypotention (17 Jul 2024 18:51)      Review of System  REVIEW OF SYSTEMS      General:	Denies fatigue, fevers, chills, sweats, decreased appetite.    Skin/Breast: denies pruritis, rash  	  Ophthalmologic: no change in vision or blurring  	  HEENT	Denies dry mouth, oral sores, dysphagia,  change in hearing.    Respiratory and Thorax:  cough, sob, wheeze, hemoptysis  	  Cardiovascular:	no cp , palp, orthopnea    Gastrointestinal:	no n/v/d constipation    Genitourinary:	no dysuria of frequency, no hematuria, no flank pain    Musculoskeletal:	no bone or joint pain. no muscle aches.     Neurological:	no change in sensory or motor function. no headache. no weakness.     Psychiatric:	no depression, no anxiety, insomnia.     Hematology/Lymphatics:	no bleeding or bruising        Current Meds  MEDICATIONS  (STANDING):  allopurinol 100 milliGRAM(s) Oral daily  aspirin enteric coated 81 milliGRAM(s) Oral daily  calcium carbonate   1250 mG (OsCal) 1 Tablet(s) Oral daily  fidaxomicin 200 milliGRAM(s) Oral two times a day  finasteride 5 milliGRAM(s) Oral daily  gabapentin Solution 100 milliGRAM(s) Oral three times a day  heparin   Injectable 5000 Unit(s) SubCutaneous every 12 hours  metoprolol succinate ER 50 milliGRAM(s) Oral daily  tamsulosin 0.4 milliGRAM(s) Oral at bedtime  zinc oxide 40% Paste 1 Application(s) Topical four times a day    MEDICATIONS  (PRN):  acetaminophen     Tablet .. 650 milliGRAM(s) Oral every 6 hours PRN Moderate Pain (4 - 6)  oxyCODONE    IR 10 milliGRAM(s) Oral every 6 hours PRN Severe Pain (7 - 10)      Vitals  Vital Signs Last 24 Hrs  T(C): 37.1 (18 Jul 2024 04:10), Max: 37.1 (18 Jul 2024 04:10)  T(F): 98.7 (18 Jul 2024 04:10), Max: 98.7 (18 Jul 2024 04:10)  HR: 87 (18 Jul 2024 04:10) (71 - 87)  BP: 126/64 (18 Jul 2024 04:10) (114/66 - 126/64)  BP(mean): --  RR: 18 (18 Jul 2024 04:10) (18 - 18)  SpO2: 94% (18 Jul 2024 04:10) (94% - 99%)    Parameters below as of 18 Jul 2024 04:10  Patient On (Oxygen Delivery Method): room air        Physical Exam  PHYSICAL EXAM:      Constitutional: NAD    Eyes: PERRLA EOMI, anicteric sclera    Heent :No oral sores, no pharyngeal injection. moist mucosa.    Neck: supple, no jvd, no LAD    Respiratory: CTA b/l     Cardiovascular: s1s2, no m/g/r    Gastrointestinal: soft, nt, nd, + BS    Extremities: no c/c/e    Neurological:A&O x 3 moves all ext.    Skin: no rash on exposed skin    Lymph Nodes: no lymphadenopathy.              Lab              Rad:    Assessment/Plan   JESSEE LERMA  [id number]    Patient is a 73y old  Male who presents with a chief complaint of hypotention (17 Jul 2024 18:51)      Review of System    Pt reports that he is feeling better.  He is anxious to go home.     General:	Denies  fevers, chills, sweats, decreased appetite.    Skin/Breast: denies pruritis, rash  	  Ophthalmologic: no change in vision or blurring  	  HEENT	Denies dry mouth, oral sores, dysphagia,  change in hearing.    Respiratory and Thorax:  no cough, sob, wheeze, hemoptysis  	  Cardiovascular:	no cp , palp, orthopnea    Gastrointestinal:	no n/v or constipation. bowel movements improved    Genitourinary:	no dysuria of frequency, no hematuria, no flank pain    Musculoskeletal:	no bone or joint pain. no muscle aches.     Neurological:	no change in sensory or motor function. no headache. no weakness.     Psychiatric:	no depression, no anxiety, insomnia.     Hematology/Lymphatics:	no bleeding or bruising      MEDICATIONS  (STANDING):  allopurinol 100 milliGRAM(s) Oral daily  aspirin enteric coated 81 milliGRAM(s) Oral daily  calcium carbonate   1250 mG (OsCal) 1 Tablet(s) Oral daily  fidaxomicin 200 milliGRAM(s) Oral two times a day  finasteride 5 milliGRAM(s) Oral daily  gabapentin Solution 100 milliGRAM(s) Oral three times a day  heparin   Injectable 5000 Unit(s) SubCutaneous every 12 hours  metoprolol succinate ER 50 milliGRAM(s) Oral daily  tamsulosin 0.4 milliGRAM(s) Oral at bedtime  zinc oxide 40% Paste 1 Application(s) Topical four times a day    MEDICATIONS  (PRN):  acetaminophen     Tablet .. 650 milliGRAM(s) Oral every 6 hours PRN Moderate Pain (4 - 6)  oxyCODONE    IR 10 milliGRAM(s) Oral every 6 hours PRN Severe Pain (7 - 10)      Vital Signs Last 24 Hrs  T(C): 37.1 (18 Jul 2024 04:10), Max: 37.1 (18 Jul 2024 04:10)  T(F): 98.7 (18 Jul 2024 04:10), Max: 98.7 (18 Jul 2024 04:10)  HR: 87 (18 Jul 2024 04:10) (71 - 87)  BP: 126/64 (18 Jul 2024 04:10) (114/66 - 126/64)  BP(mean): --  RR: 18 (18 Jul 2024 04:10) (18 - 18)  SpO2: 94% (18 Jul 2024 04:10) (94% - 99%)    Parameters below as of 18 Jul 2024 04:10  Patient On (Oxygen Delivery Method): room air      PHYSICAL EXAM:      Constitutional: NAD    Eyes: PERRLA EOMI, anicteric sclera    Heent :No oral sores, no pharyngeal injection. moist mucosa.    Neck: supple, no jvd, no LAD    Respiratory: CTA b/l     Cardiovascular: s1s2, no m/g/r    Gastrointestinal: soft, nt, nd, + BS    Extremities: no c/c/e    Neurological:A&O x 3 moves all ext.    Skin: no rash on exposed skin    Lymph Nodes: no lymphadenopathy.            Lab              Rad:    Assessment/Plan

## 2024-07-18 NOTE — DIETITIAN INITIAL EVALUATION ADULT - PERTINENT MEDS FT
MEDICATIONS  (STANDING):  allopurinol 100 milliGRAM(s) Oral daily  aspirin enteric coated 81 milliGRAM(s) Oral daily  calcium carbonate   1250 mG (OsCal) 1 Tablet(s) Oral daily  fidaxomicin 200 milliGRAM(s) Oral two times a day  finasteride 5 milliGRAM(s) Oral daily  gabapentin Solution 100 milliGRAM(s) Oral three times a day  heparin   Injectable 5000 Unit(s) SubCutaneous every 12 hours  metoprolol succinate ER 50 milliGRAM(s) Oral daily  tamsulosin 0.4 milliGRAM(s) Oral at bedtime  zinc oxide 40% Paste 1 Application(s) Topical four times a day    MEDICATIONS  (PRN):  acetaminophen     Tablet .. 650 milliGRAM(s) Oral every 6 hours PRN Moderate Pain (4 - 6)  oxyCODONE    IR 10 milliGRAM(s) Oral every 6 hours PRN Severe Pain (7 - 10)

## 2024-07-18 NOTE — PROGRESS NOTE ADULT - SUBJECTIVE AND OBJECTIVE BOX
date of service: 07-18-24 @ 09:00  [facility]  REVIEW OF SYSTEMS:  CONSTITUTIONAL: No fever,  no  weight loss  ENT:  No  tinnitus,   no   vertigo  NECK: No pain or stiffness  RESPIRATORY: No cough, wheezing, chills or hemoptysis;    No Shortness of Breath  CARDIOVASCULAR: No chest pain, palpitations, dizziness  GASTROINTESTINAL: No abdominal or epigastric pain. No nausea, vomiting, or hematemesis; No diarrhea  No melena or hematochezia.  GENITOURINARY: No dysuria, frequency, hematuria, or incontinence  NEUROLOGICAL: No headaches  SKIN: No itching,  no   rash  LYMPH Nodes: No enlarged glands  ENDOCRINE: No heat or cold intolerance  MUSCULOSKELETAL: No joint pain or swelling  PSYCHIATRIC: No depression, anxiety  HEME/LYMPH: No easy bruising, or bleeding gums  ALLERGY AND IMMUNOLOGIC: No hives or eczema	    MEDICATIONS  (STANDING):  allopurinol 100 milliGRAM(s) Oral daily  aspirin enteric coated 81 milliGRAM(s) Oral daily  calcium carbonate   1250 mG (OsCal) 1 Tablet(s) Oral daily  fidaxomicin 200 milliGRAM(s) Oral two times a day  finasteride 5 milliGRAM(s) Oral daily  gabapentin Solution 100 milliGRAM(s) Oral three times a day  heparin   Injectable 5000 Unit(s) SubCutaneous every 12 hours  metoprolol succinate ER 50 milliGRAM(s) Oral daily  tamsulosin 0.4 milliGRAM(s) Oral at bedtime  zinc oxide 40% Paste 1 Application(s) Topical four times a day    MEDICATIONS  (PRN):  acetaminophen     Tablet .. 650 milliGRAM(s) Oral every 6 hours PRN Moderate Pain (4 - 6)  oxyCODONE    IR 10 milliGRAM(s) Oral every 6 hours PRN Severe Pain (7 - 10)      Vital Signs Last 24 Hrs  T(C): 37.1 (18 Jul 2024 04:10), Max: 37.1 (18 Jul 2024 04:10)  T(F): 98.7 (18 Jul 2024 04:10), Max: 98.7 (18 Jul 2024 04:10)  HR: 87 (18 Jul 2024 04:10) (71 - 87)  BP: 126/64 (18 Jul 2024 04:10) (114/66 - 126/64)  BP(mean): --  RR: 18 (18 Jul 2024 04:10) (18 - 18)  SpO2: 94% (18 Jul 2024 04:10) (94% - 99%)    Parameters below as of 18 Jul 2024 04:10  Patient On (Oxygen Delivery Method): room air      CAPILLARY BLOOD GLUCOSE        I&O's Summary    17 Jul 2024 07:01  -  18 Jul 2024 07:00  --------------------------------------------------------  IN: 600 mL / OUT: 900 mL / NET: -300 mL    18 Jul 2024 07:01  -  18 Jul 2024 09:00  --------------------------------------------------------  IN: 240 mL / OUT: 0 mL / NET: 240 mL          Appearance: Normal	  HEENT:   Normal oral mucosa, PERRL, EOMI	  Lymphatic: No lymphadenopathy  Cardiovascular: Normal S1 S2, No JVD  Respiratory: Lungs clear to auscultation	  Gastrointestinal:  Soft, Non-tender, + BS	  Skin: No rash, No ecchymoses	  Extremities:     LABS:                          Thyroid Stimulating Hormone, Serum: 0.58 uIU/mL (06-20 @ 06:23)          Consultant(s) Notes Reviewed:      Care Discussed with Consultants/Other Providers:

## 2024-07-18 NOTE — PHYSICAL THERAPY INITIAL EVALUATION ADULT - ADDITIONAL COMMENTS
PTA pt was independent with functional mobility and ADLs using a SC. Pt lives in a  with a few steps to enter 1st floor setup inside.

## 2024-07-18 NOTE — DIETITIAN INITIAL EVALUATION ADULT - OTHER INFO
Patient confirms food allergy to walnuts. No other food allergies reported. Patient reports he weighed around 160lbs roughly 6 months before having at least 30lbs of unintentional weight loss since then. Current BW of 128.3lbs noted. [facility] growth chart unable to load when attempting to review long term anthropometric data. Will monitor weight trend.    - Mild hypokalemia noted today. Supplementation noted.  - Hypophosphatemia noted previously during admission, request updated level with next labs, replete as appropriate.  - Remains on antibiotic regimen for CDiff noted.

## 2024-07-18 NOTE — DIETITIAN INITIAL EVALUATION ADULT - ETIOLOGY
chronic illness (MM on chemotherapy), reported <PO/jose w/ unintentional wt loss PTA hypermetabolic demand for nutrients 2/2 chronic illness

## 2024-07-18 NOTE — DIETITIAN INITIAL EVALUATION ADULT - ENERGY INTAKE
Patient currently reporting mostly fair-good PO intake/appetite, improving as ongoing treatment for CDiff has been given. Nutrition education provided. Patient amenable for oral nutrition supplementation for extra caloric/protein intake. Fair (50-75%)

## 2024-07-18 NOTE — PHYSICAL THERAPY INITIAL EVALUATION ADULT - PERTINENT HX OF CURRENT PROBLEM, REHAB EVAL
73-year-old male with past medical history of multiple myeloma on chemo, anemia, BPH, hypertension, hyperlipidemia, gout presenting with diarrhea.  Patient reports that he has had intermittent episodes of "diarrhea" (described as loose, but not watery) for the past 6 weeks.  Patient was admitted to [facility] a month ago for UTI and hematuria.  During that admission C. difficile was negative x 2.  Patient states over the past few days diarrhea has progressively worsened.  Patient endorsing 6 episodes of watery mucoid, non bloody diarrhea over a 12 hour period with associated fever ([id number]

## 2024-07-18 NOTE — DIETITIAN INITIAL EVALUATION ADULT - BODY MASS INDEX
Progress Note  The patient has been accepted at [facility] on Sept. 1. This information was shared with the patient. The writer also told the patient that his antibiotic plan is scheduled to continue until Oct. 6. After 21 days at [facility], he will be incur a co-pay for care beyond 21 days.     Writer called the patient's sister, Zuleyma Nix, to discuss the discharge plan. Zuleyma Nix will pick the patient up at 12:00 and transport him to [facility]. The patient is aware of the plan to transport to [facility] as well.     22

## 2024-07-18 NOTE — PROGRESS NOTE ADULT - ASSESSMENT
73-year-old male with past medical history of multiple myeloma on chemo, anemia, BPH, hypertension, hyperlipidemia, gout presenting with diarrhea.  Patient reports that he has had intermittent episodes of "diarrhea" (described as loose, but not watery) for the past 6 weeks.  Patient was admitted to [facility] a month ago for UTI and hematuria.  During that admission C. difficile was negative x 2.  Patient states over the past few days diarrhea has progressively worsened.  Patient endorsing 6 episodes of watery mucoid, non bloody diarrhea over a 12 hour period with associated fever ([id number])    #MM and  hx AOCD - follows with Hem-Onc, on chemo  #Hx UTIs - recent Abx 6/2024  #Diarrhea with pancolitis 2/2 C diff-clinically improving; now with small volume "oatmeal consistency"stools  C. difficile + 7/12/24    RECS:  -continue PO LRD  -Abx course/duration per ID recs  -monitor counts. Per spouse outpt chemo on hold (appt for this week cancelled). Continue to hold chemo until fully recovered from  C diff infection.    Discussed with Medicine ACP, Cardiology attending  No GI objection to DC planning  Reviewed hand hygiene with pt and spouse - wash with soap and water. Wash all clothes/bedding in hot water setting (use bleach if possible) and clean bathroom with bleach based cleaning agent  Discussed with pt and spouse; all questions answered    Sean Solano PA-C  [facility] Teaching GI Service  Available on TEAMS Mon-Fri 8a-4p  After hours and weekend coverage [phone]

## 2024-07-18 NOTE — PROGRESS NOTE ADULT - SUBJECTIVE AND OBJECTIVE BOX
INTERVAL HPI/OVERNIGHT EVENTS:  small volume stool - wife and pt reporting "oatmeal consistency"  multiple stools reported (8 in 24 hours) but very small volume per pt and family report  pt reports sense of rectal urgency when bladder full - prior to catheterization. Otherwise no urgency    no abdominal pain, nausea or vomiting  no fever or chills  no rectal bleeding  appetite good - eating and snacking during the day    very eager to go home    MEDICATIONS  (STANDING):  allopurinol 100 milliGRAM(s) Oral daily  aspirin enteric coated 81 milliGRAM(s) Oral daily  calcium carbonate   1250 mG (OsCal) 1 Tablet(s) Oral daily  fidaxomicin 200 milliGRAM(s) Oral two times a day  finasteride 5 milliGRAM(s) Oral daily  gabapentin Solution 100 milliGRAM(s) Oral three times a day  heparin   Injectable 5000 Unit(s) SubCutaneous every 12 hours  metoprolol succinate ER 50 milliGRAM(s) Oral daily  tamsulosin 0.4 milliGRAM(s) Oral at bedtime  zinc oxide 40% Paste 1 Application(s) Topical four times a day    MEDICATIONS  (PRN):  acetaminophen     Tablet .. 650 milliGRAM(s) Oral every 6 hours PRN Moderate Pain (4 - 6)  oxyCODONE    IR 10 milliGRAM(s) Oral every 6 hours PRN Severe Pain (7 - 10)      Allergies  No Known Drug Allergies  walnut (Faint; Urticaria; Anaphylaxis (Severe); Short breath; Hives; Headache; Rash)      Review of Systems:  see HPI- remainder 10 point ROS negative    Vital Signs Last 24 Hrs  T(C): 37.1 (18 Jul 2024 04:10), Max: 37.1 (18 Jul 2024 04:10)  T(F): 98.7 (18 Jul 2024 04:10), Max: 98.7 (18 Jul 2024 04:10)  HR: 87 (18 Jul 2024 04:10) (71 - 87)  BP: 126/64 (18 Jul 2024 04:10) (114/66 - 126/64)  BP(mean): --  RR: 18 (18 Jul 2024 04:10) (18 - 18)  SpO2: 94% (18 Jul 2024 04:10) (94% - 99%)    Parameters below as of 18 Jul 2024 04:10  Patient On (Oxygen Delivery Method): room air    PHYSICAL EXAM:  Constitutional: NAD, elderly WM  non toxic appearing; alert and responsive. Spouse at bedside.  Neck: No LAD, supple no JVD  Respiratory: grossly clear bl  no inc WOB  Cardiovascular: S1 and S2, regular  Gastrointestinal: BS+, soft ND,  NT no rebound guarding or rigidity  Extremities: No peripheral edema, neg clubbing, cyanosis  Vascular: 2+ peripheral pulses  Neurological: A/O x 3, no focal asymmetry  Psychiatric: Normal mood, normal affect  Skin: No rashes, anicteric good turgor    LABS:                  RADIOLOGY & ADDITIONAL TESTS:

## 2024-07-18 NOTE — DIETITIAN INITIAL EVALUATION ADULT - ORAL INTAKE PTA/DIET HISTORY
Patient reports he has had decreased PO intake/appetite for several months PTA during receiving chemotherapy treatment for MM. Denied chewing/swallowing impairment or nausea/vomiting. Patient reports he will have smaller amounts of food at home, will drink Ensure shakes between 1-2x per day primarily. Patient reports he did have a period of time where food items tasted differently and smell of foods were altered further decreasing his appetite.

## 2024-07-18 NOTE — DIETITIAN INITIAL EVALUATION ADULT - PERSON TAUGHT/METHOD
adequate caloric/protein/fluid intake, current diet order, low fiber diet education, role of oral nutrition supplements, weight management/weight gain, all questions were answered/verbal instruction/teach back - (Patient repeats in own words)/patient instructed/spouse instructed

## 2024-07-19 ENCOUNTER — TRANSCRIPTION ENCOUNTER (OUTPATIENT)
Age: 74
End: 2024-07-19

## 2024-07-19 VITALS
HEART RATE: 74 BPM | OXYGEN SATURATION: 94 % | SYSTOLIC BLOOD PRESSURE: 98 MMHG | DIASTOLIC BLOOD PRESSURE: 57 MMHG | TEMPERATURE: 98 F | RESPIRATION RATE: 18 BRPM

## 2024-07-19 LAB
ANION GAP SERPL CALC-SCNC: 12 MMOL/L — SIGNIFICANT CHANGE UP (ref 5–17)
BUN SERPL-MCNC: 14 MG/DL — SIGNIFICANT CHANGE UP (ref 7–23)
CALCIUM SERPL-MCNC: 8.5 MG/DL — SIGNIFICANT CHANGE UP (ref 8.4–10.5)
CHLORIDE SERPL-SCNC: 106 MMOL/L — SIGNIFICANT CHANGE UP (ref 96–108)
CO2 SERPL-SCNC: 24 MMOL/L — SIGNIFICANT CHANGE UP (ref 22–31)
CREAT SERPL-MCNC: 1.15 MG/DL — SIGNIFICANT CHANGE UP (ref 0.5–1.3)
EGFR: 66 ML/MIN/1.73M2 — SIGNIFICANT CHANGE UP
GLUCOSE SERPL-MCNC: 116 MG/DL — HIGH (ref 70–99)
POTASSIUM SERPL-MCNC: 3.8 MMOL/L — SIGNIFICANT CHANGE UP (ref 3.5–5.3)
POTASSIUM SERPL-SCNC: 3.8 MMOL/L — SIGNIFICANT CHANGE UP (ref 3.5–5.3)
SODIUM SERPL-SCNC: 142 MMOL/L — SIGNIFICANT CHANGE UP (ref 135–145)

## 2024-07-19 PROCEDURE — 82330 ASSAY OF CALCIUM: CPT

## 2024-07-19 PROCEDURE — 96375 TX/PRO/DX INJ NEW DRUG ADDON: CPT

## 2024-07-19 PROCEDURE — 74177 CT ABD & PELVIS W/CONTRAST: CPT | Mod: MC

## 2024-07-19 PROCEDURE — 80053 COMPREHEN METABOLIC PANEL: CPT

## 2024-07-19 PROCEDURE — 85018 HEMOGLOBIN: CPT

## 2024-07-19 PROCEDURE — 87046 STOOL CULTR AEROBIC BACT EA: CPT

## 2024-07-19 PROCEDURE — 87507 IADNA-DNA/RNA PROBE TQ 12-25: CPT

## 2024-07-19 PROCEDURE — 87045 FECES CULTURE AEROBIC BACT: CPT

## 2024-07-19 PROCEDURE — 83735 ASSAY OF MAGNESIUM: CPT

## 2024-07-19 PROCEDURE — 99232 SBSQ HOSP IP/OBS MODERATE 35: CPT

## 2024-07-19 PROCEDURE — 87040 BLOOD CULTURE FOR BACTERIA: CPT

## 2024-07-19 PROCEDURE — 85027 COMPLETE CBC AUTOMATED: CPT

## 2024-07-19 PROCEDURE — 36415 COLL VENOUS BLD VENIPUNCTURE: CPT

## 2024-07-19 PROCEDURE — 84295 ASSAY OF SERUM SODIUM: CPT

## 2024-07-19 PROCEDURE — 83690 ASSAY OF LIPASE: CPT

## 2024-07-19 PROCEDURE — 87086 URINE CULTURE/COLONY COUNT: CPT

## 2024-07-19 PROCEDURE — 84132 ASSAY OF SERUM POTASSIUM: CPT

## 2024-07-19 PROCEDURE — 85014 HEMATOCRIT: CPT

## 2024-07-19 PROCEDURE — 83605 ASSAY OF LACTIC ACID: CPT

## 2024-07-19 PROCEDURE — 87186 SC STD MICRODIL/AGAR DIL: CPT

## 2024-07-19 PROCEDURE — 80048 BASIC METABOLIC PNL TOTAL CA: CPT

## 2024-07-19 PROCEDURE — 81001 URINALYSIS AUTO W/SCOPE: CPT

## 2024-07-19 PROCEDURE — 97161 PT EVAL LOW COMPLEX 20 MIN: CPT

## 2024-07-19 PROCEDURE — 82947 ASSAY GLUCOSE BLOOD QUANT: CPT

## 2024-07-19 PROCEDURE — 82435 ASSAY OF BLOOD CHLORIDE: CPT

## 2024-07-19 PROCEDURE — 71045 X-RAY EXAM CHEST 1 VIEW: CPT

## 2024-07-19 PROCEDURE — 96374 THER/PROPH/DIAG INJ IV PUSH: CPT

## 2024-07-19 PROCEDURE — 87324 CLOSTRIDIUM AG IA: CPT

## 2024-07-19 PROCEDURE — 87077 CULTURE AEROBIC IDENTIFY: CPT

## 2024-07-19 PROCEDURE — 99285 EMERGENCY DEPT VISIT HI MDM: CPT | Mod: 25

## 2024-07-19 PROCEDURE — 87449 NOS EACH ORGANISM AG IA: CPT

## 2024-07-19 PROCEDURE — 87177 OVA AND PARASITES SMEARS: CPT

## 2024-07-19 PROCEDURE — 85025 COMPLETE CBC W/AUTO DIFF WBC: CPT

## 2024-07-19 PROCEDURE — 84100 ASSAY OF PHOSPHORUS: CPT

## 2024-07-19 PROCEDURE — 82803 BLOOD GASES ANY COMBINATION: CPT

## 2024-07-19 RX ORDER — FIDAXOMICIN 200 MG/5ML
1 GRANULE, FOR SUSPENSION ORAL
Qty: 7 | Refills: 0
Start: 2024-07-19 | End: 2024-07-21

## 2024-07-19 RX ORDER — METOPROLOL TARTRATE 50 MG
1 TABLET ORAL
Qty: 30 | Refills: 0
Start: 2024-07-19 | End: 2024-08-17

## 2024-07-19 RX ORDER — OXYCODONE HYDROCHLORIDE 100 MG/5ML
1 SOLUTION ORAL
Qty: 20 | Refills: 0
Start: 2024-07-19 | End: 2024-07-23

## 2024-07-19 RX ADMIN — OXYCODONE HYDROCHLORIDE 10 MILLIGRAM(S): 100 SOLUTION ORAL at 11:05

## 2024-07-19 RX ADMIN — Medication 50 MILLIGRAM(S): at 05:47

## 2024-07-19 RX ADMIN — FIDAXOMICIN 200 MILLIGRAM(S): 200 GRANULE, FOR SUSPENSION ORAL at 05:47

## 2024-07-19 RX ADMIN — ASPIRIN 81 MILLIGRAM(S): 325 TABLET, FILM COATED ORAL at 11:22

## 2024-07-19 RX ADMIN — Medication 1 APPLICATION(S): at 11:23

## 2024-07-19 RX ADMIN — Medication 1 TABLET(S): at 11:22

## 2024-07-19 RX ADMIN — OXYCODONE HYDROCHLORIDE 10 MILLIGRAM(S): 100 SOLUTION ORAL at 10:04

## 2024-07-19 RX ADMIN — OXYCODONE HYDROCHLORIDE 10 MILLIGRAM(S): 100 SOLUTION ORAL at 00:18

## 2024-07-19 RX ADMIN — ALLOPURINOL 100 MILLIGRAM(S): 300 TABLET ORAL at 11:23

## 2024-07-19 RX ADMIN — HEPARIN SODIUM 5000 UNIT(S): 50 INJECTION, SOLUTION INTRAVENOUS at 05:47

## 2024-07-19 RX ADMIN — Medication 5 MILLIGRAM(S): at 11:23

## 2024-07-19 RX ADMIN — Medication 100 MILLIGRAM(S): at 15:06

## 2024-07-19 RX ADMIN — Medication 1 APPLICATION(S): at 05:47

## 2024-07-19 RX ADMIN — Medication 100 MILLIGRAM(S): at 05:47

## 2024-07-19 NOTE — DISCHARGE NOTE PROVIDER - DETAILS OF MALNUTRITION DIAGNOSIS/DIAGNOSES
This patient has been assessed with a concern for Malnutrition and was treated during this hospitalization for the following Nutrition diagnosis/diagnoses:     -  07/18/2024: Severe protein-calorie malnutrition

## 2024-07-19 NOTE — PROGRESS NOTE ADULT - SUBJECTIVE AND OBJECTIVE BOX
INTERVAL HPI/OVERNIGHT EVENTS:  seen in AM rounds  eager to go home  stool consistency continues to improve  small volume stooling  no abdominal pain, nausea or vomiting  worked with PT yesterday    MEDICATIONS  (STANDING):  allopurinol 100 milliGRAM(s) Oral daily  aspirin enteric coated 81 milliGRAM(s) Oral daily  calcium carbonate   1250 mG (OsCal) 1 Tablet(s) Oral daily  fidaxomicin 200 milliGRAM(s) Oral two times a day  finasteride 5 milliGRAM(s) Oral daily  gabapentin Solution 100 milliGRAM(s) Oral three times a day  heparin   Injectable 5000 Unit(s) SubCutaneous every 12 hours  metoprolol succinate ER 50 milliGRAM(s) Oral daily  tamsulosin 0.4 milliGRAM(s) Oral at bedtime  zinc oxide 40% Paste 1 Application(s) Topical four times a day    MEDICATIONS  (PRN):  acetaminophen     Tablet .. 650 milliGRAM(s) Oral every 6 hours PRN Moderate Pain (4 - 6)  oxyCODONE    IR 10 milliGRAM(s) Oral every 6 hours PRN Severe Pain (7 - 10)      Allergies    No Known Drug Allergies  walnut (Faint; Urticaria; Anaphylaxis (Severe); Short breath; Hives; Headache; Rash)      Review of Systems:  see HPI- remainder 10 point ROS negative    Vital Signs Last 24 Hrs  T(C): 36.9 (19 Jul 2024 11:10), Max: 37.5 (19 Jul 2024 05:04)  T(F): 98.4 (19 Jul 2024 11:10), Max: 99.5 (19 Jul 2024 05:04)  HR: 74 (19 Jul 2024 11:10) (74 - 88)  BP: 98/57 (19 Jul 2024 11:10) (98/57 - 135/68)  BP(mean): --  RR: 18 (19 Jul 2024 11:10) (18 - 18)  SpO2: 94% (19 Jul 2024 11:10) (94% - 97%)    Parameters below as of 19 Jul 2024 11:10  Patient On (Oxygen Delivery Method): room air      PHYSICAL EXAM:  Constitutional: NAD, elderly WM  non toxic appearing; alert and responsive. Spouse at bedside.  Neck: No LAD, supple no JVD  Respiratory: grossly clear bl  no inc WOB  Cardiovascular: S1 and S2, regular  Gastrointestinal: BS+, soft ND,  NT no rebound guarding or rigidity  Extremities: No peripheral edema, neg clubbing, cyanosis  Vascular: 2+ peripheral pulses  Neurological: A/O x 3, no focal asymmetry  Psychiatric: Normal mood, normal affect  Skin: No rashes, anicteric good turgor    LABS:                        9.6    5.06  )-----------( 460      ( 18 Jul 2024 13:03 )             30.4     07-18    141  |  106  |  10  ----------------------------<  81  3.4<L>   |  22  |  1.10    Ca    8.3<L>      18 Jul 2024 13:03            RADIOLOGY & ADDITIONAL TESTS:

## 2024-07-19 NOTE — PROGRESS NOTE ADULT - ASSESSMENT
73-year-old male with past medical history of multiple myeloma on chemo, anemia, BPH, hypertension, hyperlipidemia, gout presenting with diarrhea dur to C. difficile.   Overall appears to be improving on fidaxomicin  Benign abdomen  Blood cx NTD  Straight caths but no urinary symptoms per se- would not direct treatment vs. urine.   Mild AGUS, improved  Myeloma on Rx    Doing well. No concern for uncontrolled infection on exam.     Continue Dificid 10-14 day course  Would not treat bacteriuria  No objection to outpatient management  Can follow up with Dr. Hartley as outpatient    I'll sign off at this time.   Thank you for the courtesy of this referral.    Rory Berg MD  Attending Physician  [facility]  Division of Infectous Diseases  [phone]

## 2024-07-19 NOTE — DISCHARGE NOTE PROVIDER - NSDCCPCAREPLAN_GEN_ALL_CORE_FT
PRINCIPAL DISCHARGE DIAGNOSIS  Diagnosis: Clostridium difficile colitis  Assessment and Plan of Treatment: If you no longer have diarrhea you do not have to do anything special.  Good hand washing is always important.  If you still have diarrhea, you and your family members should follow these guidelines.  Wash your hands before you eat and prepare food.  Wash your hands well after you use toilet.  Wash your hands well before and after you care for a person with C. Difficile.  Wear disposable gloves if you must handle stool.  Clean the bathroom daily with disinfectant  (diluted bleach).  Put disposable waste, like diapers or other such items, into plastic bags.  Tie the bags securely, and throw them out with the regular trash.  If clothes are heavly soiled with stool, wash them separately in detergent and bleach.  Clothes not soiled with stool can be washed with other clothing.  Please continue your medication as prescribed, unless otherwise instructed to by a provider or physician.      SECONDARY DISCHARGE DIAGNOSES  Diagnosis: Multiple myeloma  Assessment and Plan of Treatment: Please follow up with your oncologist at [facility], Dr. Cochran

## 2024-07-19 NOTE — PROGRESS NOTE ADULT - NUTRITIONAL ASSESSMENT
This patient has been assessed with a concern for Malnutrition and has been determined to have a diagnosis/diagnoses of Severe protein-calorie malnutrition.    This patient is being managed with:   Diet Low Fiber-  Supplement Feeding Modality:  Oral  Ensure Plus High Protein Cans or Servings Per Day:  2       Frequency:  Daily  Entered: Jul 18 2024  5:45PM

## 2024-07-19 NOTE — DISCHARGE NOTE PROVIDER - NSDCFUSCHEDAPPT_GEN_ALL_CORE_FT
[facility]  UROLOGY [address] [facility]  Scheduled Appointment: 08/12/2024    Sidney Waldrop  [facility]  UROLOGY [address] [facility]  Scheduled Appointment: 08/12/2024

## 2024-07-19 NOTE — PROGRESS NOTE ADULT - ASSESSMENT
73-year-old male with past medical history of multiple myeloma on chemo, anemia, BPH, hypertension, hyperlipidemia, gout presenting with diarrhea.  Patient reports that he has had intermittent episodes of "diarrhea" (described as loose, but not watery) for the past 6 weeks.  Patient was admitted to [facility] a month ago for UTI and hematuria.  During that admission C. difficile was negative x 2.  Patient states over the past few days diarrhea has progressively worsened.  Patient endorsing 6 episodes of watery mucoid, non bloody diarrhea over a 12 hour period with associated fever ([id number])    #MM and  hx AOCD - follows with Hem-Onc, on chemo  #Hx UTIs - recent Abx 6/2024  #Diarrhea with pancolitis 2/2 C diff-clinically improving; now with small volume "oatmeal consistency"stools  C. difficile + 7/12/24    RECS:  -continue PO LRD  -Abx course/duration per ID recs  -monitor counts. Per spouse outpt chemo on hold (appt for this week cancelled). Continue to hold chemo until fully recovered from  C diff infection.    Discussed with Medicine ACP, Cardiology attending  No GI objection to DC planning  Reviewed hand hygiene with pt and spouse - wash with soap and water. Wash all clothes/bedding in hot water setting (use bleach if possible) and clean bathroom with bleach based cleaning agent  Discussed with pt and spouse; all questions answered    Sean Solano PA-C  [facility] Teaching GI Service  Available on TEAMS Mon-Fri 8a-4p  After hours and weekend coverage [phone]

## 2024-07-19 NOTE — PROGRESS NOTE ADULT - PROVIDER SPECIALTY LIST ADULT
Cardiology
Cardiology
Gastroenterology
Infectious Disease
Cardiology
Gastroenterology
Gastroenterology
Infectious Disease
Internal Medicine
Cardiology
Heme/Onc
Internal Medicine
Gastroenterology
Infectious Disease
Internal Medicine

## 2024-07-19 NOTE — PROGRESS NOTE ADULT - NS ATTEND AMEND GEN_ALL_CORE FT
C.diff colitis improving, small qty of stool  No fever chills abd pain or NVD  cont present Abx
73y M with diarrhea and positive c.diff, getting better  On Abx as rec by ID  agree with DC paul
Agree with above note. BRiefly, Pt. is a 73-year-old male with past medical history of multiple myeloma on chemo, anemia, BPH, hypertension, hyperlipidemia, gout presenting with diarrhea.  Patient reports that he has had intermittent episodes of "diarrhea" (described as loose, but not watery) for the past 6 weeks.  Patient was admitted to [facility] a month ago for UTI and hematuria.  During that admission C. difficile was negative x 2.  Patient states over the past few days diarrhea has progressively worsened.  Patient endorsing 6 episodes of watery mucoid, non bloody diarrhea over a 12 hour period with associated fever ([id number])  GI Issues:  1. MM and  hx AOCD - follows with Hem-Onc, on chemo  2. Hx UTIs - recent Abx 6/2024  3. Diarrhea with pancolitis 2/2 C diff-clinically improving; now with small volume "oatmeal consistency"stools  4. C. difficile + 7/12/24    RECS:  -continue PO LRD  -Abx course/duration per ID recs  -monitor counts. Per spouse outpt chemo on hold (appt for this week cancelled). Continue to hold chemo until fully recovered from  C diff infection.    Garcia Arriaga MD  [facility] GI

## 2024-07-19 NOTE — CHART NOTE - NSCHARTNOTEFT_GEN_A_CORE
Advanced Care Planning:  I have had goals of care discussion, advanced directive and code status with patient/family    and  in  coordinating   patient care.     all questions answered and expressed understanding of the plan.   pt  is  full  code
Patient Demographic Information (PDI)       PDI	First Name	Last Name	Birth Date	Gender	Street Address	[facility]	Zip Code  CHANEL Roberto	1950	Male	[address]	[facility] [facility]	[address]    Prescription Information      PDI Filter:    PDI	My Rx	Current Rx	Drug Type	Rx Written	Rx Dispensed	Drug	Quantity	Days Supply	Prescriber Name	Prescriber ALISA #	Payment Method	Dispenser  A	N	Y	O	07/09/2024	07/09/2024	oxycodone-acetaminophen 5-325 mg tablet	60	20	Jeremy Fonseca	[id number]	Medicare	[employer]  [employer]  A	N	N	O	06/28/2024	06/28/2024	diphenoxylate-atropine 2.5-0.025 mg tablet	30	8	Kinza Escamilla	[id number]	Medicare	[employer]  A	N	N	O	06/03/2024	06/03/2024	oxycodone-acetaminophen 5-325 mg tablet	60	20	Jeremy Fonseca	[id number]	Medicare	[employer]  A	N	N	O	03/26/2024	03/26/2024	oxycodone-acetaminophen 5-325 mg tablet	60	15	Jeremy Fonseca	[id number]	Medicare	[employer]  [employer]  A	N	N	O	02/27/2024	02/27/2024	oxycodone-acetaminophen 5-325 mg tablet	21	7	Jeremy oFnseca	[id number]	Medicare	[employer]  [employer]  A	N	N	O	01/19/2024	01/25/2024	oxycodone-acetaminophen 5-325 mg tablet	40	10	Kinza Escamilla	[id number]	Medicare	[employer]  A	N	N	O	11/30/2023	12/01/2023	fentanyl 50 mcg/hr patch	5	15	Kinza Escamilla	[id number]	Medicare	[employer]  [employer]  A	N	N	O	11/30/2023	11/30/2023	oxycodone-acetaminophen 5-325 mg tablet	40	10	Kinza Escamilla	[id number]	Medicare	[employer]  A	N	N	O	10/19/2023	10/20/2023	oxycodone-acetaminophen 5-325 mg tablet	50	16	Kinza Escamilla	[id number]	Medicare	[employer]  [employer]  A	N	N	O	10/19/2023	10/20/2023	fentanyl 25 mcg/hr patch	10	30	Kinza Escamilla	[id number]	[facility]  A	N	N	O	10/05/2023	10/06/2023	oxycodone-acetaminophen 5-325 mg tablet	30	10	Kinza Escamilla	[id number]	Medicare	[employer]  [employer]  A	N	N	O	09/15/2023	09/15/2023	oxycodone-acetaminophen 5-325 mg tablet	20	7	Kinza Escamilla	[id number]	Medicare	[employer]
Patient's stool studies are positive for active C. difficile infection. Agree with antibiotics per ID.    Byron Maldonado MD  Gastroenterology/Hepatology Fellow

## 2024-07-19 NOTE — DISCHARGE NOTE PROVIDER - CARE PROVIDER_API CALL
Maciel Guido  Cardiovascular Disease  [address] [place] [address]  Phone: [phone]  Fax: [phone]  Follow Up Time: 1 week

## 2024-07-19 NOTE — DISCHARGE NOTE NURSING/CASE MANAGEMENT/SOCIAL WORK - NSDCFUADDAPPT_GEN_ALL_CORE_FT
APPTS ARE READY TO BE MADE: [ ] YES    Best Family or Patient Contact (if needed):    Additional Information about above appointments (if needed):    1: Follow up with Urology Dr. Waldrop OP  2: Follow up with Oncology OP  3:     Other comments or requests:

## 2024-07-19 NOTE — PROGRESS NOTE ADULT - SUBJECTIVE AND OBJECTIVE BOX
Date of Service: 07-19-24 @ 08:14           CARDIOLOGY     PROGRESS  NOTE   ________________________________________________    CHIEF COMPLAINT:Patient is a 73y old  Male who presents with a chief complaint of Per chart, patient is a 74 y/o male with PMH including MM (on chemotherapy), anemia, BPH, HTN, HLD, gout. Patient presents to [facility] w/ intermittent episodes of diarrhea x6 weeks PTA. GI consulted; identified w/ pancolitis 2/2 CDiff per GI, started on antibiotic regimen, ID following for management. (18 Jul 2024 17:27)  no complain wants to go home  	  REVIEW OF SYSTEMS:  CONSTITUTIONAL: No fever, weight loss, or fatigue  EYES: No eye pain, visual disturbances, or discharge  ENT:  No difficulty hearing, tinnitus, vertigo; No sinus or throat pain  NECK: No pain or stiffness  RESPIRATORY: No cough, wheezing, chills or hemoptysis; No Shortness of Breath  CARDIOVASCULAR: No chest pain, palpitations, passing out, dizziness, or leg swelling  GASTROINTESTINAL: No abdominal or epigastric pain. No nausea, vomiting, or hematemesis; No diarrhea or constipation. No melena or hematochezia.  GENITOURINARY: No dysuria, frequency, hematuria, or incontinence  NEUROLOGICAL: No headaches, memory loss, loss of strength, numbness, or tremors  SKIN: No itching, burning, rashes, or lesions   LYMPH Nodes: No enlarged glands  ENDOCRINE: No heat or cold intolerance; No hair loss  MUSCULOSKELETAL: No joint pain or swelling; No muscle, back, or extremity pain  PSYCHIATRIC: No depression, anxiety, mood swings, or difficulty sleeping  HEME/LYMPH: No easy bruising, or bleeding gums  ALLERGY AND IMMUNOLOGIC: No hives or eczema	    [ x] All others negative	  [ ] Unable to obtain    PHYSICAL EXAM:  T(C): 37.5 (07-19-24 @ 05:04), Max: 37.5 (07-19-24 @ 05:04)  HR: 80 (07-19-24 @ 05:04) (80 - 88)  BP: 133/67 (07-19-24 @ 05:04) (101/63 - 135/68)  RR: 18 (07-19-24 @ 05:04) (18 - 18)  SpO2: 97% (07-19-24 @ 05:04) (95% - 97%)  Wt(kg): --  I&O's Summary    18 Jul 2024 07:01  -  19 Jul 2024 07:00  --------------------------------------------------------  IN: 480 mL / OUT: 1275 mL / NET: -795 mL        Appearance: Normal	  HEENT:   Normal oral mucosa, PERRL, EOMI	  Lymphatic: No lymphadenopathy  Cardiovascular: Normal S1 S2, No JVD, + murmurs, No edema  Respiratory: rhonchi  Psychiatry: A & O x 3, Mood & affect appropriate  Gastrointestinal:  Soft, Non-tender, + BS	  Skin: No rashes, No ecchymoses, No cyanosis	  Neurologic: Non-focal  Extremities: Normal range of motion, No clubbing, cyanosis or edema  Vascular: Peripheral pulses palpable 2+ bilaterally    MEDICATIONS  (STANDING):  allopurinol 100 milliGRAM(s) Oral daily  aspirin enteric coated 81 milliGRAM(s) Oral daily  calcium carbonate   1250 mG (OsCal) 1 Tablet(s) Oral daily  fidaxomicin 200 milliGRAM(s) Oral two times a day  finasteride 5 milliGRAM(s) Oral daily  gabapentin Solution 100 milliGRAM(s) Oral three times a day  heparin   Injectable 5000 Unit(s) SubCutaneous every 12 hours  metoprolol succinate ER 50 milliGRAM(s) Oral daily  tamsulosin 0.4 milliGRAM(s) Oral at bedtime  zinc oxide 40% Paste 1 Application(s) Topical four times a day      TELEMETRY: 	    ECG:  	  RADIOLOGY:  OTHER: 	  	  LABS:	 	    CARDIAC MARKERS:                                9.6    5.06  )-----------( 460      ( 18 Jul 2024 13:03 )             30.4     07-18    141  |  106  |  10  ----------------------------<  81  3.4<L>   |  22  |  1.10    Ca    8.3<L>      18 Jul 2024 13:03      proBNP:   Lipid Profile:   HgA1c:   TSH: Thyroid Stimulating Hormone, Serum: 0.58 uIU/mL (06-20 @ 06:23)          Assessment and plan  ---------------------------  73-year-old male with past medical history of multiple myeloma on chemo, anemia, BPH, hypertension, hyperlipidemia, gout presenting with diarrhea.  Patient reports that he has had intermittent episodes of diarrhea for the past 6 weeks.  Patient was admitted to [facility] a month ago for UTI and hematuria.  During that admission C. difficile was negative x 2.  Patient states over the past few days diarrhea has progressively worsened.  Patient endorsing 6 episodes of watery diarrhea since last evening.  Patient states that he has been feeling generally weak and fatigued.  A few days ago he was able to work with physical therapy and was being scheduled to have a hip replacement, but today he is unable to even get out of bed secondary to fatigue.  Patient also endorsing fevers with Tmax of 101 yesterday.  Otherwise denies chest pain, shortness of breath, abdominal pain, vomiting, but does endorse poor appetite.  pt is well known to me with hx of htn, MM, cad, admitted with hematuria with c/o worsening diarrhea  r/o c diff  colitis, check stool culture  start on IVF  awaiting ct abdomen and pelvis  GI consult called  CAD/ decrease EF, pt will eventually needs cardiac cath  C diff + ID gi noted appreciated  MM onc follow up as needed  CAD/ASHD, adjust cardiac meds, suspect sig cad, stable  continue ivf/ follow up cultures  ID appreciated continue meds, no abdominal pain  nutrition eval, severe protein deficiency  oob to the chair  ID and GI appreciated, dc planning if possible/ id course of abx  follow up as out pt

## 2024-07-19 NOTE — DISCHARGE NOTE PROVIDER - NSDCFUADDAPPT_GEN_ALL_CORE_FT
APPTS ARE READY TO BE MADE: [ ] YES    Best Family or Patient Contact (if needed):    Additional Information about above appointments (if needed):    1: Follow up with Urology Dr. Waldrop OP  2: Follow up with Oncology OP  3:     Other comments or requests:    APPTS ARE READY TO BE MADE: [ ] YES    Best Family or Patient Contact (if needed):    Additional Information about above appointments (if needed):    1: Follow up with Urology OP Dr. Waldrop  2: Follow up with [facility] Oncology OP Dr. Cochran  3:     Other comments or requests:    APPTS ARE READY TO BE MADE: [x] YES    Best Family or Patient Contact (if needed): Susan Roberto (Wife) [phone]    Additional Information about above appointments (if needed):    1: Follow up with Urology OP Dr. Waldrop (already scheduled for 8/12)  2: Follow up with [facility] Oncology OP Dr. Cochran

## 2024-07-19 NOTE — DISCHARGE NOTE NURSING/CASE MANAGEMENT/SOCIAL WORK - CASE MANAGER'S NAME
Caller calling back asking where to go.  Was triaged earlier and instructed to go to ED.  Two closest ED locations provided.  [facility] and [facility].  Pt states understanding.    Reason for Disposition  • Nursing judgment or information in reference    Protocols used: No Guideline [id number]    
Regarding: [place]/73 year [place]/shoulder pain (broken bone, 9 or 10 out of 10)  ----- Message from Adali Nicole sent at 6/16/2024  6:57 AM CDT -----  Patient Name: Сергей Schmitt    Specialist or PCP Name: Mavis Pabon    Symptoms: shoulder pain (broken bone, 9 or 10 out of 10)    Pregnant (females aged 13-60. If Yes, how long?) : N/A    Call Back # : [phone]    Which State are you currently located in?: [place]    Name of Clinic Site / Acct# : [place] - [address] - Primary Care    
Angela Montiel  [phone]

## 2024-07-19 NOTE — DISCHARGE NOTE PROVIDER - HOSPITAL COURSE
Hospital Course:  HPI:  73-year-old male with past medical history of multiple myeloma on chemo, anemia, BPH, hypertension, hyperlipidemia, gout presenting with diarrhea.  Patient reports that he has had intermittent episodes of diarrhea for the past 6 weeks.  Patient was admitted to [facility] a month ago for UTI and hematuria.  During that admission C. difficile was negative x 2.  Patient states over the past few days diarrhea has progressively worsened.  Patient endorsing 6 episodes of watery diarrhea since last evening.  Patient states that he has been feeling generally weak and fatigued.  A few days ago he was able to work with physical therapy and was being scheduled to have a hip replacement, but today he is unable to even get out of bed secondary to fatigue.  Patient also endorsing fevers with Tmax of 101 yesterday.  Otherwise denies chest pain, shortness of breath, abdominal pain, vomiting, but does endorse poor appetite.    C. Diff        follow stool output, afebrile        c/w fidaxomicin (until 07/22).      M Myeloma          on revlimid/ on hold for now, onc [facility]/ oncology- Dr. Cochran          has low  wbc/  anemia       OA, hips         c/o  c/c pain left hip. h/o  OA  ,  b/l  hips.  L > R, seen by  neuro  and  N/S,  no  intervention   no  clonus         xray, b/l OA/  CT   pelvis,  OA  b/; hips,  severe  OA  left  hip,  collapse  of  left  femoral  head.  c/c  fx'z lumbar  spine/  awiating  surg          on oxy  at  home/  severe  OA  hip     HTN/HLD       c/w metoprolol     Gout       c/w allopurinol     BPH        c/w flomax/ proscar       h/o  self cath   for past year, c/c  bladder  outlet  obstruction     CAD/ Decrease EF  Echo  2022.  ef  46.  diastolic dysfunction/  WMA.   and   f/p  echo,   ef   41. with  WMA  persistent  hypocalcemia -known  to  endo Dr calero      Patient is medically cleared for discharge. Medication reconciliation reviewed, revised, and resolved with Dr. Rob who had medically cleared patient for discharge with follow-up as advised. Patient is currently stable for discharge to home at this time.    Important Medication Changes and Reason:  Continue  fidaxomicin 200mg twice daily (end date 07/22) for C.diff  Hold revlimid for now - F/U with OP [facility] Oncologist Dr. Cochran    Active or Pending Issues Requiring Follow-up:  1: Follow up with Urology OP Dr. Waldrop  2: Follow up with [facility] Oncology OP Dr. Cochran    Advanced Directives:   [ X] Full code  [ ] DNR  [ ] Hospice    Discharge Diagnoses:  C.Diff  Multiple Myeloma  Anemia  BH  HTN  HLD  Gout       HPI:  73-year-old male with past medical history of multiple myeloma on chemo, anemia, BPH, hypertension, hyperlipidemia, gout presenting with diarrhea.  Patient reports that he has had intermittent episodes of diarrhea for the past 6 weeks.  Patient was admitted to Health system a month ago for UTI and hematuria.  During that admission C. difficile was negative x 2.  Patient states over the past few days diarrhea has progressively worsened.  Patient endorsing 6 episodes of watery diarrhea since last evening.  Patient states that he has been feeling generally weak and fatigued.  A few days ago he was able to work with physical therapy and was being scheduled to have a hip replacement, but today he is unable to even get out of bed secondary to fatigue.  Patient also endorsing fevers with Tmax of 101 yesterday.  Otherwise denies chest pain, shortness of breath, abdominal pain, vomiting, but does endorse poor appetite.    Hospital Course:  Patient was admitted to medicine with concern for c.diff colitis. Stool culture negative for pathogens. C. Diff + 7/12/24. Started on oral vancomycin as per ID recommendations and discontinued on same day, started on Dificid with duration of 10 days.    C. Diff        follow stool output, afebrile        c/w fidaxomicin (until 07/22).      M Myeloma          on revlimid/ on hold for now, onc [facility]/ oncology- Dr. Cochran          has low  wbc/  anemia       OA, hips         c/o  c/c pain left hip. h/o  OA  ,  b/l  hips.  L > R, seen by  neuro  and  neurosurgery  no  intervention   no  clonus         xray, b/l OA/  CT   pelvis,  OA  b/; hips,  severe  OA  left  hip,  collapse  of  left  femoral  head.  c/c  fx'z lumbar  spine/  awiating  surg          on oxy  at  home/  severe  OA  hip     HTN/HLD       c/w metoprolol     Gout       c/w allopurinol     BPH        c/w flomax/ proscar       h/o  self cath   for past year, c/c  bladder  outlet  obstruction     CAD/ Decrease EF  Echo  2022.  ef  46.  diastolic dysfunction/  WMA.   and   f/p  echo,   ef   41. with  WMA  persistent  hypocalcemia -known  to  endo Dr calero      Patient is medically cleared for discharge. Medication reconciliation reviewed, revised, and resolved with Dr. Rob who had medically cleared patient for discharge with follow-up as advised. Patient is currently stable for discharge to home at this time.    Important Medication Changes and Reason:  Continue  fidaxomicin 200mg twice daily (end date 07/22) for C.diff  Hold revlimid for now - F/U with OP [facility] Oncologist Dr. Cochran    Active or Pending Issues Requiring Follow-up:  1: Follow up with Urology OP Dr. Waldrop  2: Follow up with [facility] Oncology OP Dr. Cochran    Advanced Directives:   [ X] Full code  [ ] DNR  [ ] Hospice    Discharge Diagnoses:  C.Diff  Multiple Myeloma  Anemia  BH  HTN  HLD  Gout       HPI:  73-year-old male with past medical history of multiple myeloma on chemo, anemia, BPH, hypertension, hyperlipidemia, gout presenting with diarrhea.  Patient reports that he has had intermittent episodes of diarrhea for the past 6 weeks.  Patient was admitted to [facility] a month ago for UTI and hematuria.  During that admission C. difficile was negative x 2.  Patient states over the past few days diarrhea has progressively worsened.  Patient endorsing 6 episodes of watery diarrhea since last evening.  Patient states that he has been feeling generally weak and fatigued.  A few days ago he was able to work with physical therapy and was being scheduled to have a hip replacement, but today he is unable to even get out of bed secondary to fatigue.  Patient also endorsing fevers with Tmax of 101 yesterday.  Otherwise denies chest pain, shortness of breath, abdominal pain, vomiting, but does endorse poor appetite.    Hospital Course:  Patient was admitted to medicine with concern for c.diff colitis. Stool culture negative for pathogens. C. Diff + 7/12/24. Started on oral vancomycin as per ID recommendations and discontinued on same day, started on Dificid with duration of 10 days.    C. Diff        follow stool output, afebrile        c/w fidaxomicin (until 07/22).      M Myeloma          Consulted by heme/onc -on revlimid/ on hold for now, onc [facility]/ oncology- Dr. Cochran          has low  wbc/  anemia       OA, hips         c/o  c/c pain left hip. h/o  OA  ,  b/l  hips.  L > R, seen by  neuro  and  neurosurgery  no  intervention   no  clonus         xray, b/l OA/  CT   pelvis,  OA  b/; hips,  severe  OA  left  hip,  collapse  of  left  femoral  head.  c/c  fx'z lumbar  spine/  awiating  surg          on oxy  at  home/  severe  OA  hip     HTN/HLD       c/w metoprolol     Gout       c/w allopurinol     BPH        c/w flomax/ proscar       h/o  self cath   for past year, c/c  bladder  outlet  obstruction     CAD/ Decrease EF  Echo  2022.  ef  46.  diastolic dysfunction/  WMA.   and   f/p  echo,   ef   41. with  WMA  persistent  hypocalcemia -known  to  endo Dr calero      Patient is medically cleared for discharge. Medication reconciliation reviewed, revised, and resolved with Dr. Rob who had medically cleared patient for discharge with follow-up as advised. Patient is currently stable for discharge to home at this time.    Important Medication Changes and Reason:  Continue  fidaxomicin 200mg twice daily (end date 07/22) for C.diff  Hold revlimid for now - F/U with OP [facility] Oncologist Dr. Cochran    Active or Pending Issues Requiring Follow-up:  1: Follow up with Urology OP Dr. Waldrop  2: Follow up with [facility] Oncology OP Dr. Ccohran    Advanced Directives:   [ X] Full code  [ ] DNR  [ ] Hospice    Discharge Diagnoses:  C.Diff  Multiple Myeloma  Anemia  BH  HTN  HLD  Gout

## 2024-07-19 NOTE — DISCHARGE NOTE PROVIDER - NSDCMRMEDTOKEN_GEN_ALL_CORE_FT
allopurinol 100 mg oral tablet: orally once a day  aspirin 81 mg oral tablet: 1 tab(s) orally once a day  calcitriol 0.5 mcg oral capsule: 1 cap(s) orally once a day  calcium carbonate 1250 mg (500 mg elemental calcium) oral tablet: 1 tab(s) orally 2 times a day  Drisdol 1.25 mg (50,000 intl units) oral capsule: 1 cap(s) orally once a week Please take weekly for 6 weeks  fidaxomicin 200 mg oral tablet: 1 tab(s) orally 2 times a day  finasteride 5 mg oral tablet: 1 tab(s) orally once a day  gabapentin 250 mg/5 mL oral solution: 1 milliliter(s) orally 3 times a day  metoprolol succinate 25 mg oral tablet, extended release: 1 tab(s) orally 2 times a day  tamsulosin 0.4 mg oral capsule: 1 cap(s) orally 2 times a day   allopurinol 100 mg oral tablet: orally once a day  aspirin 81 mg oral tablet: 1 tab(s) orally once a day  calcitriol 0.5 mcg oral capsule: 1 cap(s) orally once a day  calcium carbonate 1250 mg (500 mg elemental calcium) oral tablet: 1 tab(s) orally 2 times a day  Drisdol 1.25 mg (50,000 intl units) oral capsule: 1 cap(s) orally once a week Please take weekly for 6 weeks  fidaxomicin 200 mg oral tablet: 1 tab(s) orally 2 times a day  finasteride 5 mg oral tablet: 1 tab(s) orally once a day  gabapentin 250 mg/5 mL oral solution: 1 milliliter(s) orally 3 times a day  metoprolol succinate 50 mg oral tablet, extended release: 1 tab(s) orally once a day  oxyCODONE 10 mg oral tablet: 1 tab(s) orally every 6 hours as needed for Severe Pain (7 - 10) MDD: 40mg per day  tamsulosin 0.4 mg oral capsule: 1 cap(s) orally 2 times a day

## 2024-07-19 NOTE — DISCHARGE NOTE NURSING/CASE MANAGEMENT/SOCIAL WORK - PATIENT PORTAL LINK FT
You can access the FollowMyHealth Patient Portal offered by [facility] by registering at the following website: [email or web address]. By joining [email or web address]’s FollowMyHealth portal, you will also be able to view your health information using other applications (apps) compatible with our system.

## 2024-07-19 NOTE — PROGRESS NOTE ADULT - REASON FOR ADMISSION
hypotension
hypotention
hypotension
hypotension/  colitis
hypotention

## 2024-07-19 NOTE — PROGRESS NOTE ADULT - SUBJECTIVE AND OBJECTIVE BOX
[facility]  Division of Infectious Diseases  [phone]    Name: JESSEE LERMA  Age: 73y  Gender: Male  MRN: [id number]    Interval History--  Notes reviewed.     Past Medical History--  Hypertension    Hyperlipidemia    Multiple myeloma    Calculus of ureter    BPH (benign prostatic hyperplasia)    Lumbar stenosis    History of osteoporosis    Arthritis    Anemia    CAD, multiple vessel    History of colonoscopy        For details regarding the patient's social history, family history, and other miscellaneous elements, please refer the initial infectious diseases consultation and/or the admitting history and physical examination for this admission.    Allergies    No Known Drug Allergies  walnut (Faint; Urticaria; Anaphylaxis (Severe); Short breath; Hives; Headache; Rash)    Intolerances        Medications--  Antibiotics:  fidaxomicin 200 milliGRAM(s) Oral two times a day    Immunologic:    Other:  acetaminophen     Tablet .. PRN  allopurinol  aspirin enteric coated  calcium carbonate   1250 mG (OsCal)  finasteride  gabapentin Solution  heparin   Injectable  metoprolol succinate ER  oxyCODONE    IR PRN  tamsulosin  zinc oxide 40% Paste      Review of Systems--  A 10-point review of systems was obtained.     Pertinent positives and negatives--  Constitutional: No fevers. No Chills. No Rigors.   Cardiovascular: No chest pain. No palpitations.  Respiratory: No shortness of breath. No cough.  Gastrointestinal: No nausea or vomiting. No diarrhea or constipation.   Psychiatric: Pleasant. Appropriate affect.    Review of systems otherwise negative except as previously noted.    Physical Examination--  Vital Signs: T(F): 98.4 (07-19-24 @ 11:10), Max: 99.5 (07-19-24 @ 05:04)  HR: 74 (07-19-24 @ 11:10)  BP: 98/57 (07-19-24 @ 11:10)  RR: 18 (07-19-24 @ 11:10)  SpO2: 94% (07-19-24 @ 11:10)  Wt(kg): --  General: Nontoxic-appearing Male in no acute distress.  HEENT: AT/NC. PERRL. EOMI. Anicteric. Conjunctiva pink and moist. Oropharynx clear. Dentition fair.  Neck: Not rigid. No sense of mass.  Nodes: None palpable.  Lungs: Clear bilaterally without rales, wheezing or rhonchi  Heart: Regular rate and rhythm. No Murmur. No rub. No gallop. No palpable thrill.  Abdomen: Bowel sounds present and normoactive. Soft. Nondistended. Nontender. No sense of mass. No organomegaly.  Back: No spinal tenderness. No costovertebral angle tenderness.   Extremities: No cyanosis or clubbing. No edema.   Skin: Warm. Dry. Good turgor. No rash. No vasculitic stigmata.  Psychiatric: Appropriate affect and mood for situation.         Laboratory Studies--  CBC                        9.6    5.06  )-----------( 460      ( 18 Jul 2024 13:03 )             30.4       Chemistries  07-19    142  |  106  |  14  ----------------------------<  116<H>  3.8   |  24  |  1.15    Ca    8.5      19 Jul 2024 12:30        Culture Data           [facility]  Division of Infectious Diseases  [phone]    Name: JESSEE LERMA  Age: 73y  Gender: Male  MRN: [id number]    Covering Dr. Garcia    Interval History--  Notes reviewed. Feeling better. Tolerating PO. No BM today. 2 yesterday, soft/formed/nondiarrheal. Denies fevers, chills, or rigors. Pain not an issue.    Past Medical History--  Hypertension    Hyperlipidemia    Multiple myeloma    Calculus of ureter    BPH (benign prostatic hyperplasia)    Lumbar stenosis    History of osteoporosis    Arthritis    Anemia    CAD, multiple vessel    History of colonoscopy        For details regarding the patient's social history, family history, and other miscellaneous elements, please refer the initial infectious diseases consultation and/or the admitting history and physical examination for this admission.    Allergies    No Known Drug Allergies  walnut (Faint; Urticaria; Anaphylaxis (Severe); Short breath; Hives; Headache; Rash)    Intolerances        Medications--  Antibiotics:  fidaxomicin 200 milliGRAM(s) Oral two times a day    Immunologic:    Other:  acetaminophen     Tablet .. PRN  allopurinol  aspirin enteric coated  calcium carbonate   1250 mG (OsCal)  finasteride  gabapentin Solution  heparin   Injectable  metoprolol succinate ER  oxyCODONE    IR PRN  tamsulosin  zinc oxide 40% Paste      Review of Systems--  A 10-point review of systems was obtained.   Review of systems otherwise negative except as previously noted.    Physical Examination--  Vital Signs: T(F): 98.4 (07-19-24 @ 11:10), Max: 99.5 (07-19-24 @ 05:04)  HR: 74 (07-19-24 @ 11:10)  BP: 98/57 (07-19-24 @ 11:10)  RR: 18 (07-19-24 @ 11:10)  SpO2: 94% (07-19-24 @ 11:10)  Wt(kg): --  General: Nontoxic-appearing Male in no acute distress  HEENT: AT.  Anicteric. Conjunctiva pink and moist.   Neck: Not rigid. No sense of mass.  Nodes: None palpable.  Lungs: Diminished BS B no RWR  Heart: Regular rate and rhythm.  Abdomen: Bowel sounds present and normoactive. Soft. Nondistended. Nontender.  Extremities: No cyanosis or clubbing. No edema.   Skin: Warm. Dry. Good turgor.  Psychiatric: Grossly appropriate affect and mood for situation.       Laboratory Studies--  CBC                        9.6    5.06  )-----------( 460      ( 18 Jul 2024 13:03 )             30.4       Chemistries  07-19    142  |  106  |  14  ----------------------------<  116<H>  3.8   |  24  |  1.15    Ca    8.5      19 Jul 2024 12:30        Culture Data

## 2024-08-09 NOTE — HISTORY OF PRESENT ILLNESS
[FreeTextEntry1] : 73-year-old male who presents for follow-up. To review, he was admitted to [facility] in May 2022 with AGUS, urinary retention, bilateral hydronephrosis.  A catheter was placed in the output, creatinine 12.9 on admission.  This downtrended to 2 by discharge.  He was initiated on Flomax, finasteride, and CIC  #BPH, elevated PSA, hematuria, UTI PSA 5/2022 - 2.92 PSA 1/2023 - 3.00 PSA 2/2024 - 4.740 Cr 10/2023 - 1.25 CT June 2024: Multiple diverticula, bilateral hydronephrosis to the level bladder, no stones On CIC Presents for US   #Stones PET CT 9/13/2023 - 5mm distal LEFT ureteral stone, moderate hydronephrosis Now s/p LEFT URS / LL 11/2023 He has no flank pain.  CT 2/2024 - no residual stones

## 2024-09-16 ENCOUNTER — APPOINTMENT (OUTPATIENT)
Dept: UROLOGY | Facility: CLINIC | Age: 74
End: 2024-09-16

## 2024-10-01 ENCOUNTER — APPOINTMENT (OUTPATIENT)
Dept: UROLOGY | Facility: CLINIC | Age: 74
End: 2024-10-01
Payer: MEDICARE

## 2024-10-01 DIAGNOSIS — R31.0 GROSS HEMATURIA: ICD-10-CM

## 2024-10-01 DIAGNOSIS — N13.9 OBSTRUCTIVE AND REFLUX UROPATHY, UNSPECIFIED: ICD-10-CM

## 2024-10-01 DIAGNOSIS — R33.9 RETENTION OF URINE, UNSPECIFIED: ICD-10-CM

## 2024-10-01 DIAGNOSIS — N40.1 BENIGN PROSTATIC HYPERPLASIA WITH LOWER URINARY TRACT SYMPMS: ICD-10-CM

## 2024-10-01 DIAGNOSIS — N13.30 UNSPECIFIED HYDRONEPHROSIS: ICD-10-CM

## 2024-10-01 DIAGNOSIS — N13.8 BENIGN PROSTATIC HYPERPLASIA WITH LOWER URINARY TRACT SYMPMS: ICD-10-CM

## 2024-10-01 PROCEDURE — 76775 US EXAM ABDO BACK WALL LIM: CPT

## 2024-10-01 PROCEDURE — 99214 OFFICE O/P EST MOD 30 MIN: CPT | Mod: 25

## 2024-10-01 NOTE — HISTORY OF PRESENT ILLNESS
[FreeTextEntry1] : 73-year-old male who presents for follow-up. To review, he was admitted to [facility] in May 2022 with AGUS, urinary retention, bilateral hydronephrosis.  A catheter was placed in the output, creatinine 12.9 on admission.  This downtrended to 2 by discharge.  He was initiated on Flomax, finasteride, and CIC  #BPH, elevated PSA, hematuria, UTI PSA 5/2022 - 2.92 PSA 1/2023 - 3.00 PSA 2/2024 - 4.740 Cr 10/2023 - 1.25 CT June 2024: Multiple diverticula, bilateral hydronephrosis to the level bladder, no stones, prostate 50cc on CT On CIC - performs twice / day (9 AM and before bed). Usually empties ~400cc. Voids some urine in between. NO recent UTIs  #Stones PET CT 9/13/2023 - 5mm distal LEFT ureteral stone, moderate hydronephrosis Now s/p LEFT URS / LL 11/2023 He has no flank pain.  CT 2/2024 - no residual stones

## 2024-10-01 NOTE — ASSESSMENT
[FreeTextEntry1] : Mr Roberto is a 73 y.o. M who presents with BPH, urinary retention, b/l hydronephrosis  #Retention, b/l hydronephrosis, BPH - On CIC but only twice / day. Was recommended QID - wife thinks this caused UTI. Discussed compliance fransisca around 350 mL - should target volumes less than this - WAS scheduled for TURP over 1 year ago but patient canceled.  Discussed could have continued renal deterioration if does not address outlet / continue with adequate cath frequency. DID discuss should consider outlet procedure again given significantly trabeculated bladder, hydronephrosis resulting from urinary obstruction, poorly compliant bladder. He has multiple myeloma and is concerned about undergoing general anesthesia.  He is inquiring about UroLift or Rezum.  He has a median lobe and therefore do not believe UroLift is a good idea.  Did discuss Rezum.  Discussed the need for a catheter for several days.  Discussed urinary symptoms may worsen before they get better.  Discussed risk of UTI, worsening of symptoms. He will think about this - Continue finasteride, Flomax - UDS w/ BLANKENSHIP - re-reviewed  #Nephrolithiasis - s/p URS 11/2023 - Stone analysis: 100% calcium oxalate monohydrate - Increase hydration, add lemon juice - CT 6/2024 without stones - US today without stones

## 2024-10-01 NOTE — HISTORY OF PRESENT ILLNESS
[FreeTextEntry1] : 73-year-old male who presents for follow-up. To review, he was admitted to [facility] in May 2022 with AGUS, urinary retention, bilateral hydronephrosis.  A catheter was placed in the output, creatinine 12.9 on admission.  This downtrended to 2 by discharge.  He was initiated on Flomax, finasteride, and CIC  #BPH, elevated PSA, hematuria, UTI PSA 5/2022 - 2.92 PSA 1/2023 - 3.00 PSA 2/2024 - 4.740 Cr 10/2023 - 1.25 CT June 2024: Multiple diverticula, bilateral hydronephrosis to the level bladder, no stones, prostate 50cc on CT On CIC - performs twice / day (9 AM and before bed). Usually empties ~400cc. Voids some urine in between. NO recent UTIs  #Stones PET CT 9/13/2023 - 5mm distal LEFT ureteral stone, moderate hydronephrosis Now s/p LEFT URS / LL 11/2023 He has no flank pain.  CT 2/2024 - no residual stones

## 2024-10-01 NOTE — PHYSICAL EXAM
[Normal Appearance] : normal appearance [Edema] : no peripheral edema [] : no respiratory distress [Bowel Sounds] : normal bowel sounds [Abdomen Tenderness] : non-tender [Urethral Meatus] : meatus normal [Epididymis] : the epididymides were normal [Testes Tenderness] : no tenderness of the testes [Testes Mass (___cm)] : there were no testicular masses

## 2024-12-19 ENCOUNTER — APPOINTMENT (OUTPATIENT)
Dept: UROLOGY | Facility: CLINIC | Age: 74
End: 2024-12-19
Payer: MEDICARE

## 2024-12-19 DIAGNOSIS — N13.9 OBSTRUCTIVE AND REFLUX UROPATHY, UNSPECIFIED: ICD-10-CM

## 2024-12-19 DIAGNOSIS — N13.8 BENIGN PROSTATIC HYPERPLASIA WITH LOWER URINARY TRACT SYMPMS: ICD-10-CM

## 2024-12-19 DIAGNOSIS — N40.1 BENIGN PROSTATIC HYPERPLASIA WITH LOWER URINARY TRACT SYMPMS: ICD-10-CM

## 2024-12-19 PROCEDURE — 99442: CPT

## 2025-03-24 ENCOUNTER — RX RENEWAL (OUTPATIENT)
Age: 75
End: 2025-03-24

## 2025-04-15 NOTE — ED PROVIDER NOTE - RATE
Patient: Scot Spaulding    Procedure: Procedure(s):  CYSTOSCOPY, WITH TRANSURETHRAL RESECTION OF PROSTATE ABSCESS       Diagnosis: Prostate abscess [N41.2]  Diagnosis Additional Information: No value filed.    Anesthesia Type:   General     Note:    Oropharynx: oropharynx clear of all foreign objects  Level of Consciousness: drowsy  Oxygen Supplementation: face mask  Level of Supplemental Oxygen (L/min / FiO2): 8  Independent Airway: airway patency satisfactory and stable  Dentition: dentition unchanged  Vital Signs Stable: post-procedure vital signs reviewed and stable  Report to RN Given: handoff report given  Patient transferred to: PACU    Handoff Report: Identifed the Patient, Identified the Reponsible Provider, Reviewed the pertinent medical history, Discussed the surgical course, Reviewed Intra-OP anesthesia mangement and issues during anesthesia, Set expectations for post-procedure period and Allowed opportunity for questions and acknowledgement of understanding      Vitals:  Vitals Value Taken Time   /70 04/14/25 2130   Temp 37.0 04/14/25 2130   Pulse 89 04/14/25 2131   Resp 18 04/14/25 2131   SpO2 100 % 04/14/25 2131   Vitals shown include unfiled device data.    Electronically Signed By: Abhijit Amin  April 14, 2025  9:32 PM  
83

## 2025-07-17 NOTE — PROGRESS NOTE ADULT - SUBJECTIVE AND OBJECTIVE BOX
**** INCOMPLETE NOTE ****    INTERVAL HPI/OVERNIGHT EVENTS:  no abdominal pain  no nausea or vomiting  appetite good    small BM this AM - very small volume    MEDICATIONS  (STANDING):  allopurinol 100 milliGRAM(s) Oral daily  aspirin enteric coated 81 milliGRAM(s) Oral daily  calcium carbonate   1250 mG (OsCal) 1 Tablet(s) Oral daily  fidaxomicin 200 milliGRAM(s) Oral two times a day  finasteride 5 milliGRAM(s) Oral daily  gabapentin Solution 100 milliGRAM(s) Oral three times a day  heparin   Injectable 5000 Unit(s) SubCutaneous every 12 hours  metoprolol succinate ER 50 milliGRAM(s) Oral daily  sodium chloride 0.9%. 1000 milliLiter(s) (60 mL/Hr) IV Continuous <Continuous>  tamsulosin 0.4 milliGRAM(s) Oral at bedtime    MEDICATIONS  (PRN):  acetaminophen     Tablet .. 650 milliGRAM(s) Oral every 6 hours PRN Moderate Pain (4 - 6)  oxyCODONE    IR 10 milliGRAM(s) Oral every 6 hours PRN Severe Pain (7 - 10)      Allergies  No Known Drug Allergies  walnut (Faint; Urticaria; Anaphylaxis (Severe); Short breath; Hives; Headache; Rash)      Review of Systems:      Vital Signs Last 24 Hrs  T(C): 36.9 (15 Jul 2024 06:44), Max: 37.1 (14 Jul 2024 20:43)  T(F): 98.4 (15 Jul 2024 06:44), Max: 98.7 (14 Jul 2024 20:43)  HR: 58 (15 Jul 2024 06:44) (58 - 99)  BP: 122/69 (15 Jul 2024 06:44) (114/69 - 122/69)  BP(mean): --  RR: 18 (15 Jul 2024 06:44) (18 - 18)  SpO2: 96% (15 Jul 2024 06:44) (96% - 97%)    Parameters below as of 15 Jul 2024 06:44  Patient On (Oxygen Delivery Method): room air        PHYSICAL EXAM:      LABS:                        9.1    3.77  )-----------( 366      ( 15 Jul 2024 07:47 )             28.6     07-15    138  |  109<H>  |  11  ----------------------------<  95  3.9   |  19<L>  |  0.98    Ca    7.9<L>      15 Jul 2024 07:47  Mg     1.7     07-15        Urinalysis Basic - ( 15 Jul 2024 07:47 )    Color: x / Appearance: x / SG: x / pH: x  Gluc: 95 mg/dL / Ketone: x  / Bili: x / Urobili: x   Blood: x / Protein: x / Nitrite: x   Leuk Esterase: x / RBC: x / WBC x   Sq Epi: x / Non Sq Epi: x / Bacteria: x      LIVER FUNCTIONS - ( 12 Jul 2024 07:52 )  Alb: 3.3 g/dL / Pro: 6.0 g/dL / ALK PHOS: 54 U/L / ALT: 8 U/L / AST: 8 U/L / GGT: x             RADIOLOGY & ADDITIONAL TESTS:   LVM with patient to return call back to clinic.        INTERVAL HPI/OVERNIGHT EVENTS:  no abdominal pain  no nausea or vomiting  appetite good    Diarrhea improving; small BM this AM - very small volume loose x 1  no BM overnight    "I want to go home as soon as possible"    MEDICATIONS  (STANDING):  allopurinol 100 milliGRAM(s) Oral daily  aspirin enteric coated 81 milliGRAM(s) Oral daily  calcium carbonate   1250 mG (OsCal) 1 Tablet(s) Oral daily  fidaxomicin 200 milliGRAM(s) Oral two times a day  finasteride 5 milliGRAM(s) Oral daily  gabapentin Solution 100 milliGRAM(s) Oral three times a day  heparin   Injectable 5000 Unit(s) SubCutaneous every 12 hours  metoprolol succinate ER 50 milliGRAM(s) Oral daily  sodium chloride 0.9%. 1000 milliLiter(s) (60 mL/Hr) IV Continuous <Continuous>  tamsulosin 0.4 milliGRAM(s) Oral at bedtime    MEDICATIONS  (PRN):  acetaminophen     Tablet .. 650 milliGRAM(s) Oral every 6 hours PRN Moderate Pain (4 - 6)  oxyCODONE    IR 10 milliGRAM(s) Oral every 6 hours PRN Severe Pain (7 - 10)      Allergies  No Known Drug Allergies  walnut (Faint; Urticaria; Anaphylaxis (Severe); Short breath; Hives; Headache; Rash)      Review of Systems:  see HPI- remainder 10 point ROS negative      Vital Signs Last 24 Hrs  T(C): 36.9 (15 Jul 2024 06:44), Max: 37.1 (14 Jul 2024 20:43)  T(F): 98.4 (15 Jul 2024 06:44), Max: 98.7 (14 Jul 2024 20:43)  HR: 58 (15 Jul 2024 06:44) (58 - 99)  BP: 122/69 (15 Jul 2024 06:44) (114/69 - 122/69)  BP(mean): --  RR: 18 (15 Jul 2024 06:44) (18 - 18)  SpO2: 96% (15 Jul 2024 06:44) (96% - 97%)    Parameters below as of 15 Jul 2024 06:44  Patient On (Oxygen Delivery Method): room air        PHYSICAL EXAM:  Constitutional: NAD, elderly WM  non toxic appearing; alert and responsive. Spouse at bedside. Completed breakfast >75% of tray completed  Neck: No LAD, supple no JVD  Respiratory: grossly clear bl  no inc WOB  Cardiovascular: S1 and S2, regular  Gastrointestinal: BS+, soft ND,  NT no rebound guarding or rigidity  Extremities: No peripheral edema, neg clubbing, cyanosis  Vascular: 2+ peripheral pulses  Neurological: A/O x 3, no focal asymmetry  Psychiatric: Normal mood, normal affect  Skin: No rashes, anicteric good turgor    LABS:                        9.1    3.77  )-----------( 366      ( 15 Jul 2024 07:47 )             28.6     07-15    138  |  109<H>  |  11  ----------------------------<  95  3.9   |  19<L>  |  0.98    Ca    7.9<L>      15 Jul 2024 07:47  Mg     1.7     07-15  C. difficile GDH & toxins A/B by EIA (07.12.24 @ 13:07)   Clostridium difficile GDH Toxins A&B, EIA:   Positive  Clostridium difficile GDH Interpretation: Positive for toxigenic C. Difficile.        RADIOLOGY & ADDITIONAL TESTS:

## (undated) DEVICE — PACK CYSTO

## (undated) DEVICE — SOL INJ LR 500ML

## (undated) DEVICE — FOLEY TRAY 16FR 5CC LTX UMETER CLOSED

## (undated) DEVICE — GOWN TRIMAX LG

## (undated) DEVICE — CATH IV SAFE 24GX3/4

## (undated) DEVICE — CATH IV SAFE BC BLU 22GAX1.0"

## (undated) DEVICE — GLV 8 ULTRAFREE MAX

## (undated) DEVICE — SOL IRR POUR NS 0.9% 500ML

## (undated) DEVICE — GLV 6.5 PROTEXIS (WHITE)

## (undated) DEVICE — GLV 7.5 ULTRAFREE MAX

## (undated) DEVICE — TRAY EPIDURAL SINGLE DOSE

## (undated) DEVICE — FOLEY HOLDER STATLOCK 2 WAY ADULT

## (undated) DEVICE — GLV 7.5 PROTEXIS (WHITE)

## (undated) DEVICE — PACK IV START WITH CHG

## (undated) DEVICE — NSA-C-ARM: Type: DURABLE MEDICAL EQUIPMENT

## (undated) DEVICE — Device

## (undated) DEVICE — IRR BULB PATHFINDER + 10"

## (undated) DEVICE — GLV 8 PROTEXIS (WHITE)

## (undated) DEVICE — GLV 7 PROTEXIS (WHITE)

## (undated) DEVICE — SYR ASEPTO

## (undated) DEVICE — TUBING SUCTION 20FT

## (undated) DEVICE — SOL IRR BAG H2O 3000ML

## (undated) DEVICE — POSITIONER FOAM HEADREST (PINK)

## (undated) DEVICE — ADAPTER CHECK FLO 9FR STERILE

## (undated) DEVICE — VENODYNE/SCD SLEEVE CALF LARGE

## (undated) DEVICE — NDL SPNL WHIT 22GX3.5IN

## (undated) DEVICE — TUBING RANGER FLUID IRRIGATION SET DISP

## (undated) DEVICE — DRAPE EQUIPMENT BANDED BAG 30 X 30" (SHOWER CAP)

## (undated) DEVICE — ACMI SELF-SEALING SEAL UP TO 7FR

## (undated) DEVICE — SOL IRR BAG NS 0.9% 3000ML

## (undated) DEVICE — WARMING BLANKET UPPER ADULT

## (undated) DEVICE — DRAPE EQUIPMENT COVER 27"

## (undated) DEVICE — SOL IRR POUR H2O 1500ML

## (undated) DEVICE — POSITIONER FOAM EGG CRATE ULNAR 2PCS (PINK)